# Patient Record
Sex: FEMALE | Race: BLACK OR AFRICAN AMERICAN | Employment: FULL TIME | URBAN - METROPOLITAN AREA
[De-identification: names, ages, dates, MRNs, and addresses within clinical notes are randomized per-mention and may not be internally consistent; named-entity substitution may affect disease eponyms.]

---

## 2017-01-18 ENCOUNTER — OFFICE VISIT (OUTPATIENT)
Dept: PRIMARY CARE CLINIC | Age: 47
End: 2017-01-18

## 2017-01-18 VITALS
DIASTOLIC BLOOD PRESSURE: 76 MMHG | BODY MASS INDEX: 32.09 KG/M2 | SYSTOLIC BLOOD PRESSURE: 122 MMHG | OXYGEN SATURATION: 98 % | HEIGHT: 62 IN | WEIGHT: 174.4 LBS | HEART RATE: 77 BPM | RESPIRATION RATE: 20 BRPM

## 2017-01-18 DIAGNOSIS — R06.02 SHORTNESS OF BREATH: ICD-10-CM

## 2017-01-18 DIAGNOSIS — R68.84 PAIN IN JAW NOT ORIGINATING IN TEMPOROMANDIBULAR JOINT: ICD-10-CM

## 2017-01-18 DIAGNOSIS — R07.9 CHEST PAIN, UNSPECIFIED TYPE: Primary | ICD-10-CM

## 2017-01-18 DIAGNOSIS — R10.11 RIGHT UPPER QUADRANT ABDOMINAL PAIN: ICD-10-CM

## 2017-01-18 PROBLEM — Z87.898 HISTORY OF PALPITATIONS: Status: ACTIVE | Noted: 2017-01-18

## 2017-01-18 PROCEDURE — 99213 OFFICE O/P EST LOW 20 MIN: CPT | Performed by: NURSE PRACTITIONER

## 2017-01-18 ASSESSMENT — ENCOUNTER SYMPTOMS
COUGH: 0
SHORTNESS OF BREATH: 1
ABDOMINAL PAIN: 0
VOMITING: 0
CHEST TIGHTNESS: 1
NAUSEA: 0
DIARRHEA: 0
WHEEZING: 0

## 2017-01-19 ENCOUNTER — OFFICE VISIT (OUTPATIENT)
Dept: PRIMARY CARE CLINIC | Age: 47
End: 2017-01-19

## 2017-01-19 VITALS
WEIGHT: 174 LBS | SYSTOLIC BLOOD PRESSURE: 123 MMHG | TEMPERATURE: 98 F | DIASTOLIC BLOOD PRESSURE: 82 MMHG | OXYGEN SATURATION: 99 % | HEART RATE: 94 BPM | BODY MASS INDEX: 32.34 KG/M2 | RESPIRATION RATE: 18 BRPM

## 2017-01-19 DIAGNOSIS — R11.2 INTRACTABLE VOMITING WITH NAUSEA, UNSPECIFIED VOMITING TYPE: Primary | ICD-10-CM

## 2017-01-19 DIAGNOSIS — R19.7 DIARRHEA, UNSPECIFIED TYPE: ICD-10-CM

## 2017-01-19 PROCEDURE — 99214 OFFICE O/P EST MOD 30 MIN: CPT | Performed by: INTERNAL MEDICINE

## 2017-01-19 ASSESSMENT — ENCOUNTER SYMPTOMS
NAUSEA: 1
SORE THROAT: 0
RESPIRATORY NEGATIVE: 1
CHANGE IN BOWEL HABIT: 1
VOMITING: 0
COUGH: 0
EYES NEGATIVE: 1
ABDOMINAL PAIN: 0
SWOLLEN GLANDS: 0
VISUAL CHANGE: 0

## 2017-01-19 ASSESSMENT — PATIENT HEALTH QUESTIONNAIRE - PHQ9
1. LITTLE INTEREST OR PLEASURE IN DOING THINGS: 0
SUM OF ALL RESPONSES TO PHQ QUESTIONS 1-9: 0
SUM OF ALL RESPONSES TO PHQ9 QUESTIONS 1 & 2: 0
2. FEELING DOWN, DEPRESSED OR HOPELESS: 0

## 2017-03-27 ENCOUNTER — TELEPHONE (OUTPATIENT)
Dept: PRIMARY CARE CLINIC | Age: 47
End: 2017-03-27

## 2017-03-27 DIAGNOSIS — R92.8 ABNORMAL MAMMOGRAM: Primary | ICD-10-CM

## 2017-03-28 ENCOUNTER — HOSPITAL ENCOUNTER (OUTPATIENT)
Dept: MAMMOGRAPHY | Age: 47
Discharge: HOME | End: 2017-03-28
Attending: INTERNAL MEDICINE | Admitting: INTERNAL MEDICINE

## 2017-03-28 DIAGNOSIS — R92.8 ABNORMAL MAMMOGRAM: ICD-10-CM

## 2017-08-14 ENCOUNTER — OFFICE VISIT (OUTPATIENT)
Dept: PRIMARY CARE CLINIC | Age: 47
End: 2017-08-14

## 2017-08-14 VITALS
SYSTOLIC BLOOD PRESSURE: 134 MMHG | TEMPERATURE: 98 F | HEART RATE: 82 BPM | OXYGEN SATURATION: 99 % | WEIGHT: 187 LBS | BODY MASS INDEX: 35.3 KG/M2 | HEIGHT: 61 IN | DIASTOLIC BLOOD PRESSURE: 85 MMHG

## 2017-08-14 DIAGNOSIS — M54.50 ACUTE MIDLINE LOW BACK PAIN WITHOUT SCIATICA: Primary | ICD-10-CM

## 2017-08-14 PROCEDURE — 99213 OFFICE O/P EST LOW 20 MIN: CPT | Performed by: NURSE PRACTITIONER

## 2017-08-14 RX ORDER — PREDNISONE 10 MG/1
TABLET ORAL
Qty: 30 TABLET | Refills: 0 | Status: SHIPPED | OUTPATIENT
Start: 2017-08-14 | End: 2018-01-19

## 2017-08-14 RX ORDER — TRAMADOL HYDROCHLORIDE 50 MG/1
50 TABLET ORAL 2 TIMES DAILY PRN
Qty: 14 TABLET | Refills: 0 | Status: SHIPPED | OUTPATIENT
Start: 2017-08-14 | End: 2017-08-21

## 2017-08-14 RX ORDER — CYCLOBENZAPRINE HCL 10 MG
10 TABLET ORAL 3 TIMES DAILY PRN
Qty: 60 TABLET | Refills: 0 | Status: SHIPPED | OUTPATIENT
Start: 2017-08-14 | End: 2019-02-06 | Stop reason: SDUPTHER

## 2017-08-14 ASSESSMENT — ENCOUNTER SYMPTOMS
BLOOD IN STOOL: 0
COUGH: 0
WHEEZING: 0
CONSTIPATION: 0
CHEST TIGHTNESS: 0
DIARRHEA: 0
ABDOMINAL PAIN: 0
BACK PAIN: 1
GASTROINTESTINAL NEGATIVE: 1
ABDOMINAL DISTENTION: 0
SHORTNESS OF BREATH: 0
SORE THROAT: 0

## 2017-08-24 ENCOUNTER — OFFICE VISIT (OUTPATIENT)
Dept: PRIMARY CARE CLINIC | Age: 47
End: 2017-08-24

## 2017-08-24 VITALS
WEIGHT: 187 LBS | HEIGHT: 61 IN | HEART RATE: 87 BPM | DIASTOLIC BLOOD PRESSURE: 85 MMHG | OXYGEN SATURATION: 98 % | RESPIRATION RATE: 18 BRPM | SYSTOLIC BLOOD PRESSURE: 129 MMHG | BODY MASS INDEX: 35.3 KG/M2 | TEMPERATURE: 98.3 F

## 2017-08-24 DIAGNOSIS — M54.42 ACUTE MIDLINE LOW BACK PAIN WITH LEFT-SIDED SCIATICA: Primary | ICD-10-CM

## 2017-08-24 PROCEDURE — 99213 OFFICE O/P EST LOW 20 MIN: CPT | Performed by: NURSE PRACTITIONER

## 2017-08-24 RX ORDER — IBUPROFEN 400 MG/1
400 TABLET ORAL EVERY 6 HOURS PRN
Qty: 120 TABLET | Refills: 3 | Status: SHIPPED | OUTPATIENT
Start: 2017-08-24 | End: 2019-05-21

## 2017-08-24 ASSESSMENT — ENCOUNTER SYMPTOMS
CHEST TIGHTNESS: 0
COUGH: 0
WHEEZING: 0
SHORTNESS OF BREATH: 0
ABDOMINAL PAIN: 0
GASTROINTESTINAL NEGATIVE: 1
ABDOMINAL DISTENTION: 0
CONSTIPATION: 0
DIARRHEA: 0
BLOOD IN STOOL: 0
BACK PAIN: 1
SORE THROAT: 0

## 2018-01-12 DIAGNOSIS — F41.9 ANXIETY AND DEPRESSION: ICD-10-CM

## 2018-01-12 DIAGNOSIS — F32.A ANXIETY AND DEPRESSION: ICD-10-CM

## 2018-01-19 ENCOUNTER — OFFICE VISIT (OUTPATIENT)
Dept: PRIMARY CARE CLINIC | Age: 48
End: 2018-01-19

## 2018-01-19 VITALS
SYSTOLIC BLOOD PRESSURE: 120 MMHG | DIASTOLIC BLOOD PRESSURE: 70 MMHG | TEMPERATURE: 98.5 F | WEIGHT: 184 LBS | OXYGEN SATURATION: 98 % | BODY MASS INDEX: 34.74 KG/M2 | HEIGHT: 61 IN

## 2018-01-19 DIAGNOSIS — Z71.85 VACCINE COUNSELING: ICD-10-CM

## 2018-01-19 DIAGNOSIS — F41.9 ANXIETY AND DEPRESSION: ICD-10-CM

## 2018-01-19 DIAGNOSIS — F32.A ANXIETY AND DEPRESSION: ICD-10-CM

## 2018-01-19 DIAGNOSIS — E78.2 MIXED HYPERLIPIDEMIA: ICD-10-CM

## 2018-01-19 PROCEDURE — 99214 OFFICE O/P EST MOD 30 MIN: CPT | Performed by: INTERNAL MEDICINE

## 2018-01-19 ASSESSMENT — ENCOUNTER SYMPTOMS
BLOOD IN STOOL: 0
ABDOMINAL DISTENTION: 0
CONSTIPATION: 0
CHEST TIGHTNESS: 0
SHORTNESS OF BREATH: 0
EYES NEGATIVE: 1
GASTROINTESTINAL NEGATIVE: 1
ABDOMINAL PAIN: 0
COUGH: 0
WHEEZING: 0
DIARRHEA: 0

## 2018-01-19 NOTE — PROGRESS NOTES
to person, place, and time. She has normal strength. Skin: Skin is warm. Psychiatric: She has a normal mood and affect. Her behavior is normal. Judgment normal.   Vitals reviewed. Assessment:      Arlette was seen today for check-up. Diagnoses and all orders for this visit:    Anxiety and depression  Asymptomatic with Zoloft   -     sertraline (ZOLOFT) 50 MG tablet; Take 1 tablet by mouth daily    Mixed hyperlipidemia  Since 2015 . Never started medication !  -     Lipid Panel; Future  -     TSH without Reflex; Future  -     Hemoglobin A1C; Future    Vaccine counseling  Needs Tdap & Flu vac                Plan:      Self Management Goals    Know which medication is for what condition:   Know side effects of medications, and discuss with doctor   Discuss side effects and instructions on new medications. Barriers to medication compliance addressed. All patient questions answered. Pt voiced understanding. Know correct dose/frequency of medications  Take medications at the same time each day  Stay current on medication refills  If taking OTC's check with MD/pharmacy first about interactions    LDL goal 491 or less  Systolic BP < or equal to 436  Diastolic BP < or equal to 85    Set targets for weight loss 4 lbs per month  Exercise 3-5 times per week  Keep check of weight  Weighting machine    On a scale of 1 to 5 how confident are you in these goals?   4/5    Education materials given

## 2018-04-25 ENCOUNTER — OFFICE VISIT (OUTPATIENT)
Dept: INTERNAL MEDICINE CLINIC | Age: 48
End: 2018-04-25

## 2018-04-25 VITALS
OXYGEN SATURATION: 99 % | SYSTOLIC BLOOD PRESSURE: 126 MMHG | TEMPERATURE: 98.2 F | WEIGHT: 181 LBS | HEIGHT: 61 IN | RESPIRATION RATE: 18 BRPM | DIASTOLIC BLOOD PRESSURE: 62 MMHG | HEART RATE: 84 BPM | BODY MASS INDEX: 34.17 KG/M2

## 2018-04-25 DIAGNOSIS — F32.A ANXIETY AND DEPRESSION: ICD-10-CM

## 2018-04-25 DIAGNOSIS — E78.5 DYSLIPIDEMIA: Primary | ICD-10-CM

## 2018-04-25 DIAGNOSIS — F41.9 ANXIETY AND DEPRESSION: ICD-10-CM

## 2018-04-25 DIAGNOSIS — R91.8 ABNORMAL CT SCAN, LUNG: ICD-10-CM

## 2018-04-25 PROCEDURE — 99214 OFFICE O/P EST MOD 30 MIN: CPT | Performed by: INTERNAL MEDICINE

## 2018-04-25 RX ORDER — EZETIMIBE 10 MG/1
10 TABLET ORAL DAILY
Qty: 30 TABLET | Refills: 3 | Status: SHIPPED | OUTPATIENT
Start: 2018-04-25 | End: 2018-08-25 | Stop reason: SDUPTHER

## 2018-04-25 RX ORDER — AMPICILLIN TRIHYDRATE 250 MG
1 CAPSULE ORAL DAILY
Qty: 90 CAPSULE | Refills: 5 | Status: SHIPPED | OUTPATIENT
Start: 2018-04-25 | End: 2018-10-15

## 2018-04-25 ASSESSMENT — PATIENT HEALTH QUESTIONNAIRE - PHQ9
2. FEELING DOWN, DEPRESSED OR HOPELESS: 0
SUM OF ALL RESPONSES TO PHQ QUESTIONS 1-9: 0
1. LITTLE INTEREST OR PLEASURE IN DOING THINGS: 0
SUM OF ALL RESPONSES TO PHQ9 QUESTIONS 1 & 2: 0

## 2018-04-25 ASSESSMENT — ENCOUNTER SYMPTOMS
ALLERGIC/IMMUNOLOGIC NEGATIVE: 1
ABDOMINAL PAIN: 1
EYES NEGATIVE: 1
RESPIRATORY NEGATIVE: 1

## 2018-04-30 ENCOUNTER — TELEPHONE (OUTPATIENT)
Dept: INTERNAL MEDICINE CLINIC | Age: 48
End: 2018-04-30

## 2018-05-03 ENCOUNTER — TELEPHONE (OUTPATIENT)
Dept: INTERNAL MEDICINE CLINIC | Age: 48
End: 2018-05-03

## 2018-05-05 ENCOUNTER — TELEPHONE (OUTPATIENT)
Dept: ORTHOPEDIC SURGERY | Age: 48
End: 2018-05-05

## 2018-05-05 ENCOUNTER — HOSPITAL ENCOUNTER (OUTPATIENT)
Dept: CT IMAGING | Age: 48
Discharge: HOME | End: 2018-05-05
Attending: INTERNAL MEDICINE | Admitting: INTERNAL MEDICINE

## 2018-05-05 DIAGNOSIS — R91.8 OTHER NONSPECIFIC ABNORMAL FINDING OF LUNG FIELD (CODE): ICD-10-CM

## 2018-05-05 DIAGNOSIS — R91.8 ABNORMAL CT SCAN, LUNG: ICD-10-CM

## 2018-05-14 ENCOUNTER — TELEPHONE (OUTPATIENT)
Dept: INTERNAL MEDICINE CLINIC | Age: 48
End: 2018-05-14

## 2018-08-03 ENCOUNTER — TELEPHONE (OUTPATIENT)
Dept: INTERNAL MEDICINE CLINIC | Age: 48
End: 2018-08-03

## 2018-08-03 DIAGNOSIS — Z13.29 THYROID DISORDER SCREEN: ICD-10-CM

## 2018-08-03 DIAGNOSIS — E78.5 DYSLIPIDEMIA: Primary | ICD-10-CM

## 2018-08-03 DIAGNOSIS — Z83.3 FAMILY HISTORY OF DIABETES MELLITUS (DM): ICD-10-CM

## 2018-08-25 DIAGNOSIS — E78.5 DYSLIPIDEMIA: ICD-10-CM

## 2018-08-27 RX ORDER — EZETIMIBE 10 MG/1
10 TABLET ORAL DAILY
Qty: 30 TABLET | Refills: 3 | Status: SHIPPED | OUTPATIENT
Start: 2018-08-27 | End: 2019-01-19 | Stop reason: SDUPTHER

## 2018-10-12 DIAGNOSIS — Z83.3 FAMILY HISTORY OF DIABETES MELLITUS (DM): ICD-10-CM

## 2018-10-12 DIAGNOSIS — E78.5 DYSLIPIDEMIA: ICD-10-CM

## 2018-10-12 DIAGNOSIS — Z13.29 THYROID DISORDER SCREEN: ICD-10-CM

## 2018-10-12 LAB
CHOLESTEROL, TOTAL: 243 MG/DL (ref 0–199)
HDLC SERPL-MCNC: 46 MG/DL (ref 40–60)
LDL CHOLESTEROL CALCULATED: 178 MG/DL
TRIGL SERPL-MCNC: 93 MG/DL (ref 0–150)
TSH SERPL DL<=0.05 MIU/L-ACNC: 0.92 UIU/ML (ref 0.27–4.2)
VLDLC SERPL CALC-MCNC: 19 MG/DL

## 2018-10-13 LAB
ESTIMATED AVERAGE GLUCOSE: 125.5 MG/DL
HBA1C MFR BLD: 6 %

## 2018-10-15 ENCOUNTER — OFFICE VISIT (OUTPATIENT)
Dept: INTERNAL MEDICINE CLINIC | Age: 48
End: 2018-10-15
Payer: COMMERCIAL

## 2018-10-15 VITALS
WEIGHT: 184.4 LBS | HEIGHT: 61 IN | TEMPERATURE: 98.1 F | DIASTOLIC BLOOD PRESSURE: 68 MMHG | HEART RATE: 76 BPM | BODY MASS INDEX: 34.81 KG/M2 | RESPIRATION RATE: 16 BRPM | OXYGEN SATURATION: 98 % | SYSTOLIC BLOOD PRESSURE: 126 MMHG

## 2018-10-15 DIAGNOSIS — M79.605 PAIN IN BOTH LOWER EXTREMITIES: ICD-10-CM

## 2018-10-15 DIAGNOSIS — M79.604 PAIN IN BOTH LOWER EXTREMITIES: ICD-10-CM

## 2018-10-15 DIAGNOSIS — E78.5 DYSLIPIDEMIA: Primary | ICD-10-CM

## 2018-10-15 DIAGNOSIS — Z23 NEEDS FLU SHOT: ICD-10-CM

## 2018-10-15 PROCEDURE — 99213 OFFICE O/P EST LOW 20 MIN: CPT | Performed by: INTERNAL MEDICINE

## 2018-10-15 RX ORDER — PRAVASTATIN SODIUM 20 MG
20 TABLET ORAL EVERY EVENING
Qty: 30 TABLET | Refills: 3 | Status: SHIPPED | OUTPATIENT
Start: 2018-10-15 | End: 2019-02-21 | Stop reason: SDUPTHER

## 2018-10-15 ASSESSMENT — PATIENT HEALTH QUESTIONNAIRE - PHQ9
2. FEELING DOWN, DEPRESSED OR HOPELESS: 0
SUM OF ALL RESPONSES TO PHQ QUESTIONS 1-9: 0
1. LITTLE INTEREST OR PLEASURE IN DOING THINGS: 0
SUM OF ALL RESPONSES TO PHQ QUESTIONS 1-9: 0
SUM OF ALL RESPONSES TO PHQ9 QUESTIONS 1 & 2: 0

## 2018-10-15 NOTE — PATIENT INSTRUCTIONS
2.1%  10-year risk of heart disease or stroke  On the basis of your age alone, the USPSTF guidelines suggest there is insufficient evidence you will benefit from starting aspirin for heart disease and stroke risk reduction. On the basis of your calculated risk for heart disease or stroke less than 7.5%, the ACC/AHA guidelines suggest you have no indication to be on a statin. Based on your age, your blood pressure is well-controlled. Demography Cholesterol Blood pressure Risk factors   Age: 53 Total: 736 Systolic: 045 Diabetes: no   Gender: female HDL: 46 Diastolic: 68 Smoking: no   Race: -American  On medication: no    Notes and further reading  Moderate intensity statin may be atorvastatin 10mg, pravastatin 40mg, or simvastatin 20-40mg. High intensity statin may be atorvastatin 40mg-80mg. AHA/ACC guidelines stress the importance of lifestyle modifications to lower cardiovascular disease risk in all patients. This includes eating a heart-healthy diet, regular aerobic exercises, maintenance of desirable body weight and avoidance of tobacco products. Before initiating statin therapy, clinicians and patients ought to engage in a discussion which considers addressing risk factors such as smoking and optimal lifestyle, the potential for ASCVD risk reduction benefits, adverse medication effects, drug-drug interactions, and patient preferences for treatment. Additional factors may be considered to inform treatment decision making.  These factors may include primary LDL-C greater than 160 mg/dL or other evidence of genetic hyperlipidemias, family history of premature ASCVD with onset less than 54years of age in a first degree male relative or less than 72years of age in a first degree female relative, high-sensitivity C-reactive protein greater than 2 mg/L, CAC score greater than 300 Agatston units or greater than 76 percentile for age, sex, and ethnicity, ankle-brachial index less than 0.9, or elevated lifetime risk of ASCVD.   © "[employer] [id number]

## 2018-11-06 ENCOUNTER — OFFICE VISIT (OUTPATIENT)
Dept: INTERNAL MEDICINE CLINIC | Age: 48
End: 2018-11-06
Payer: COMMERCIAL

## 2018-11-06 VITALS
WEIGHT: 183 LBS | DIASTOLIC BLOOD PRESSURE: 82 MMHG | SYSTOLIC BLOOD PRESSURE: 122 MMHG | HEART RATE: 78 BPM | OXYGEN SATURATION: 98 % | BODY MASS INDEX: 34.58 KG/M2

## 2018-11-06 DIAGNOSIS — N61.0 MASTITIS: Primary | ICD-10-CM

## 2018-11-06 DIAGNOSIS — J35.01 CHRONIC TONSILLITIS: ICD-10-CM

## 2018-11-06 DIAGNOSIS — J02.9 PHARYNGITIS, UNSPECIFIED ETIOLOGY: ICD-10-CM

## 2018-11-06 PROCEDURE — 99213 OFFICE O/P EST LOW 20 MIN: CPT | Performed by: NURSE PRACTITIONER

## 2018-11-06 RX ORDER — AZITHROMYCIN 250 MG/1
TABLET, FILM COATED ORAL
Qty: 1 PACKET | Refills: 0 | Status: SHIPPED | OUTPATIENT
Start: 2018-11-06 | End: 2018-11-10

## 2018-11-06 RX ORDER — IBUPROFEN 800 MG/1
TABLET ORAL
Qty: 90 TABLET | Refills: 1 | Status: SHIPPED | OUTPATIENT
Start: 2018-11-06 | End: 2019-05-21

## 2018-11-06 ASSESSMENT — ENCOUNTER SYMPTOMS
TROUBLE SWALLOWING: 1
BACK PAIN: 1

## 2018-11-07 DIAGNOSIS — J35.01 CHRONIC TONSILLITIS: Primary | ICD-10-CM

## 2018-11-08 ENCOUNTER — OFFICE VISIT (OUTPATIENT)
Dept: ENT CLINIC | Age: 48
End: 2018-11-08
Payer: COMMERCIAL

## 2018-11-08 VITALS — HEIGHT: 61 IN | WEIGHT: 183 LBS | BODY MASS INDEX: 34.55 KG/M2

## 2018-11-08 DIAGNOSIS — R13.10 ODYNOPHAGIA: Primary | ICD-10-CM

## 2018-11-08 DIAGNOSIS — M54.2 NECK PAIN: ICD-10-CM

## 2018-11-08 DIAGNOSIS — R49.0 DYSPHONIA: ICD-10-CM

## 2018-11-08 PROCEDURE — 31575 DIAGNOSTIC LARYNGOSCOPY: CPT | Performed by: OTOLARYNGOLOGY

## 2018-11-08 PROCEDURE — 99243 OFF/OP CNSLTJ NEW/EST LOW 30: CPT | Performed by: OTOLARYNGOLOGY

## 2018-11-08 ASSESSMENT — ENCOUNTER SYMPTOMS
CHOKING: 0
STRIDOR: 0
BACK PAIN: 0
SORE THROAT: 1
TROUBLE SWALLOWING: 1
DIARRHEA: 0
VOICE CHANGE: 1
CONSTIPATION: 0
COLOR CHANGE: 0
COUGH: 0
RHINORRHEA: 0
VOMITING: 0
BLOOD IN STOOL: 0
EYE ITCHING: 0
NAUSEA: 0
PHOTOPHOBIA: 0
FACIAL SWELLING: 0
WHEEZING: 0
EYE DISCHARGE: 0
SINUS PAIN: 0
SINUS PRESSURE: 0
SHORTNESS OF BREATH: 0

## 2018-11-08 NOTE — PROGRESS NOTES
[facility]  [address] [address] [facility], [address] [place] [address] [place]  P: [phone]  F: [phone]       Patient     D Jason Dumont  1970    ChiefComplaint     Chief Complaint   Patient presents with    Pharyngitis     NP c/o right sided neck and tonsil pain, pt saw a PCP and was referred to us. C/O tonsil stones and 3-4 sore throats in average 6 mos period       History of Present Illness     Arlette is a pleasant 73-year-old female who presents today for upper respiratory infection symptoms since Sunday. She has right cervical tender adenopathy, odynophagia, sore throat, and gravelly change in voice. She saw her PCP on Tuesday placed her on a Z-Rey. This has not helped her symptoms. 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 Denies any history of alcohol or tobacco use. She took ibuprofen 1 day which did help the symptoms somewhat. She states she does get some sore throats occasionally with tonsil stones. No difficulty breathing today. Denies dysphagia.     Past Medical History     Past Medical History:   Diagnosis Date    Depression     High cholesterol     unmedicated due to side-effects    History of palpitations     per pt: refused medication at time of diagnosis    Low back pain        Past Surgical History     Past Surgical History:   Procedure Laterality Date    UTERINE FIBROID EMBOLIZATION  2012       Family History     Family History   Problem Relation Age of Onset    Heart Disease Mother 61    Arthritis Mother     Diabetes Father 61    Heart Disease Father     Kidney Disease Father     Hypertension Brother        Social History     Social History     Social History    Marital status:      Spouse name: N/A    Number of children: 0    Years of education: N/A     Occupational History    Teacher - [employer]      Social History Main Topics    Smoking status: Never Smoker    Smokeless tobacco: Never Used    Alcohol use 0.0 oz/week      Comment:

## 2019-01-19 DIAGNOSIS — E78.5 DYSLIPIDEMIA: ICD-10-CM

## 2019-01-21 RX ORDER — EZETIMIBE 10 MG/1
TABLET ORAL
Qty: 30 TABLET | Refills: 3 | Status: SHIPPED | OUTPATIENT
Start: 2019-01-21 | End: 2019-05-21

## 2019-02-06 ENCOUNTER — OFFICE VISIT (OUTPATIENT)
Dept: INTERNAL MEDICINE CLINIC | Age: 49
End: 2019-02-06
Payer: COMMERCIAL

## 2019-02-06 VITALS
OXYGEN SATURATION: 98 % | DIASTOLIC BLOOD PRESSURE: 68 MMHG | SYSTOLIC BLOOD PRESSURE: 118 MMHG | HEART RATE: 76 BPM | WEIGHT: 184 LBS | HEIGHT: 61 IN | BODY MASS INDEX: 34.74 KG/M2 | TEMPERATURE: 99 F

## 2019-02-06 DIAGNOSIS — Z00.00 WELL ADULT EXAM: Primary | ICD-10-CM

## 2019-02-06 DIAGNOSIS — H81.10 BENIGN PAROXYSMAL POSITIONAL VERTIGO, UNSPECIFIED LATERALITY: ICD-10-CM

## 2019-02-06 DIAGNOSIS — M79.645 PAIN OF LEFT THUMB: ICD-10-CM

## 2019-02-06 DIAGNOSIS — Z12.31 SCREENING MAMMOGRAM, ENCOUNTER FOR: ICD-10-CM

## 2019-02-06 PROCEDURE — 99396 PREV VISIT EST AGE 40-64: CPT | Performed by: INTERNAL MEDICINE

## 2019-02-06 RX ORDER — IBUPROFEN 800 MG/1
800 TABLET ORAL EVERY 8 HOURS PRN
Qty: 90 TABLET | Refills: 5 | Status: SHIPPED | OUTPATIENT
Start: 2019-02-06 | End: 2019-03-19 | Stop reason: SDUPTHER

## 2019-02-06 RX ORDER — CYCLOBENZAPRINE HCL 10 MG
10 TABLET ORAL 3 TIMES DAILY PRN
Qty: 60 TABLET | Refills: 0 | Status: SHIPPED | OUTPATIENT
Start: 2019-02-06 | End: 2019-02-16

## 2019-02-06 ASSESSMENT — ENCOUNTER SYMPTOMS
RESPIRATORY NEGATIVE: 1
ALLERGIC/IMMUNOLOGIC NEGATIVE: 1
GASTROINTESTINAL NEGATIVE: 1
EYES NEGATIVE: 1

## 2019-02-15 ENCOUNTER — PATIENT MESSAGE (OUTPATIENT)
Dept: INTERNAL MEDICINE CLINIC | Age: 49
End: 2019-02-15

## 2019-02-21 DIAGNOSIS — E78.5 DYSLIPIDEMIA: ICD-10-CM

## 2019-02-24 RX ORDER — PRAVASTATIN SODIUM 20 MG
20 TABLET ORAL EVERY EVENING
Qty: 30 TABLET | Refills: 3 | Status: SHIPPED | OUTPATIENT
Start: 2019-02-24 | End: 2019-07-14 | Stop reason: SDUPTHER

## 2019-02-24 ASSESSMENT — ENCOUNTER SYMPTOMS
VOMITING: 0
VISUAL CHANGE: 0
SORE THROAT: 0
COUGH: 0
SWOLLEN GLANDS: 0
CHANGE IN BOWEL HABIT: 0
NAUSEA: 0

## 2019-03-19 ENCOUNTER — HOSPITAL ENCOUNTER (OUTPATIENT)
Dept: GENERAL RADIOLOGY | Age: 49
Discharge: HOME | End: 2019-03-19
Payer: COMMERCIAL

## 2019-03-19 ENCOUNTER — HOSPITAL ENCOUNTER (OUTPATIENT)
Age: 49
Discharge: HOME | End: 2019-03-19
Payer: COMMERCIAL

## 2019-03-19 ENCOUNTER — OFFICE VISIT (OUTPATIENT)
Dept: INTERNAL MEDICINE CLINIC | Age: 49
End: 2019-03-19
Payer: COMMERCIAL

## 2019-03-19 VITALS
BODY MASS INDEX: 35.71 KG/M2 | DIASTOLIC BLOOD PRESSURE: 82 MMHG | SYSTOLIC BLOOD PRESSURE: 130 MMHG | OXYGEN SATURATION: 98 % | WEIGHT: 189 LBS | HEART RATE: 78 BPM

## 2019-03-19 DIAGNOSIS — M54.42 ACUTE LEFT-SIDED LOW BACK PAIN WITH LEFT-SIDED SCIATICA: ICD-10-CM

## 2019-03-19 DIAGNOSIS — M54.42 ACUTE LEFT-SIDED LOW BACK PAIN WITH LEFT-SIDED SCIATICA: Primary | ICD-10-CM

## 2019-03-19 DIAGNOSIS — M79.645 PAIN OF LEFT THUMB: ICD-10-CM

## 2019-03-19 PROCEDURE — 73120 X-RAY EXAM OF HAND: CPT

## 2019-03-19 PROCEDURE — 72202 X-RAY EXAM SI JOINTS 3/> VWS: CPT

## 2019-03-19 PROCEDURE — 99213 OFFICE O/P EST LOW 20 MIN: CPT | Performed by: INTERNAL MEDICINE

## 2019-03-19 PROCEDURE — 72100 X-RAY EXAM L-S SPINE 2/3 VWS: CPT

## 2019-03-19 PROCEDURE — 73130 X-RAY EXAM OF HAND: CPT

## 2019-03-19 RX ORDER — TRAMADOL HYDROCHLORIDE 50 MG/1
50 TABLET ORAL EVERY 6 HOURS PRN
Qty: 28 TABLET | Refills: 0 | Status: SHIPPED | OUTPATIENT
Start: 2019-03-19 | End: 2019-03-26

## 2019-03-19 RX ORDER — METHOCARBAMOL 500 MG/1
500 TABLET, FILM COATED ORAL 4 TIMES DAILY
Qty: 40 TABLET | Refills: 0 | Status: SHIPPED | OUTPATIENT
Start: 2019-03-19 | End: 2019-03-29

## 2019-03-19 RX ORDER — PREDNISONE 20 MG/1
60 TABLET ORAL DAILY
Qty: 15 TABLET | Refills: 0 | Status: SHIPPED | OUTPATIENT
Start: 2019-03-19 | End: 2019-03-24

## 2019-03-19 ASSESSMENT — ENCOUNTER SYMPTOMS
BOWEL INCONTINENCE: 0
BACK PAIN: 1

## 2019-03-20 ENCOUNTER — TELEPHONE (OUTPATIENT)
Dept: INTERNAL MEDICINE CLINIC | Age: 49
End: 2019-03-20

## 2019-05-02 DIAGNOSIS — F32.A ANXIETY AND DEPRESSION: ICD-10-CM

## 2019-05-02 DIAGNOSIS — F41.9 ANXIETY AND DEPRESSION: ICD-10-CM

## 2019-05-21 ENCOUNTER — OFFICE VISIT (OUTPATIENT)
Dept: PRIMARY CARE CLINIC | Age: 49
End: 2019-05-21
Payer: COMMERCIAL

## 2019-05-21 VITALS
TEMPERATURE: 99.7 F | OXYGEN SATURATION: 98 % | HEART RATE: 93 BPM | SYSTOLIC BLOOD PRESSURE: 104 MMHG | RESPIRATION RATE: 18 BRPM | DIASTOLIC BLOOD PRESSURE: 72 MMHG

## 2019-05-21 DIAGNOSIS — J20.9 ACUTE BRONCHITIS, UNSPECIFIED ORGANISM: Primary | ICD-10-CM

## 2019-05-21 PROBLEM — R11.2 INTRACTABLE VOMITING WITH NAUSEA: Status: RESOLVED | Noted: 2017-01-19 | Resolved: 2019-05-21

## 2019-05-21 PROBLEM — M54.40 BACK PAIN OF LUMBOSACRAL REGION WITH SCIATICA: Status: ACTIVE | Noted: 2017-09-15

## 2019-05-21 PROBLEM — R19.7 DIARRHEA: Status: RESOLVED | Noted: 2017-01-19 | Resolved: 2019-05-21

## 2019-05-21 PROCEDURE — 99213 OFFICE O/P EST LOW 20 MIN: CPT | Performed by: NURSE PRACTITIONER

## 2019-05-21 RX ORDER — IBUPROFEN 600 MG/1
600 TABLET ORAL EVERY 6 HOURS PRN
Qty: 20 TABLET | Refills: 0 | Status: SHIPPED | OUTPATIENT
Start: 2019-05-21 | End: 2021-07-15

## 2019-05-21 RX ORDER — DOXYCYCLINE HYCLATE 100 MG
100 TABLET ORAL 2 TIMES DAILY
Qty: 14 TABLET | Refills: 0 | Status: SHIPPED | OUTPATIENT
Start: 2019-05-23 | End: 2019-05-30

## 2019-05-21 RX ORDER — GUAIFENESIN AND PSEUDOEPHEDRINE HCL 1200; 120 MG/1; MG/1
1 TABLET, EXTENDED RELEASE ORAL EVERY 12 HOURS PRN
Qty: 20 TABLET | Refills: 0 | Status: SHIPPED | OUTPATIENT
Start: 2019-05-21 | End: 2019-05-31

## 2019-05-21 ASSESSMENT — ENCOUNTER SYMPTOMS
SINUS PAIN: 0
NAUSEA: 1
VOICE CHANGE: 1
SHORTNESS OF BREATH: 1
ABDOMINAL DISTENTION: 0
SORE THROAT: 1
CHEST TIGHTNESS: 0
COUGH: 1
COLOR CHANGE: 0
VOMITING: 0
ABDOMINAL PAIN: 0
DIARRHEA: 1
WHEEZING: 1
EYES NEGATIVE: 1

## 2019-05-21 NOTE — PATIENT INSTRUCTIONS
Ms. Sharmin Diehl, your lung sounds are clear today, and fever and symptoms appear to be slowly resolving. You may have contracted late flu, or likely some other respiratory virus (e.g. parainfluenza, coronavirus, etc). I recommend frequent hydration and Mucinex D to relieve symptoms. If you experience persistent or worsening symptoms over the next 2 days, start doxycycline. This is a medication less likely to aggrivate your hear palpitations, and is useful for Community Aquired Pneumonia, if present. See enclosed information regarding the risks/benefits of medications prescribed today. Below is additional information about bronchitis:    Most cases of bronchitis are viral in nature. The viruses that cause bronchitis infect the bronchi and irritate them. People often wonder if taking antibiotics will help with their bronchitis. But the answer is no, because it is usually caused by a virus. Antibiotics kill bacteria, not viruses. The most common symptoms of bronchitis are:  ? A nagging cough that can last up to a few weeks  ? Coughing up mucus that is clear, yellow, or green - Some people think green mucus means you have a bacterial infection. But this is not always true. ? You might also have normal cold or flu symptoms, like a stuffy nose, sore throat, or headache. People with bronchitis do not usually get a fever. The cough can take up to 3 weeks to get better, sometimes even longer. But you should call your doctor or nurse if you have:  ? A fever higher than 100.4°F (38°C)  ? Chest pain when you cough, trouble breathing, or coughing up blood  ? A barking cough that makes it hard to talk  ? A cough and weight loss that you cannot explain  ? Symptoms that are not getting better after 3 weeks  Bronchitis almost always goes away on its own, although it can take a few weeks. Doctors do not usually treat bronchitis with antibiotic medicines.  That's because bronchitis is usually caused by a virus, and antibiotics kill bacteria, not viruses. Antibiotics will not help your bronchitis go away faster, and they can actually cause other problems. So it's not a good idea to take them if you don't really need them. To feel better, you can treat your cold and flu symptoms. Different treatments you can try include:  ? Getting lots of rest and drinking plenty of liquids  ? Drinking hot tea  ? Sucking on cough drops or hard candy (if not a choking hazard)  ? Taking over-the-counter cough and cold medicines (as directed by your provider)  ? Breathing in warm, moist air, such as in the shower, over a kettle, or from a humidifier  ? Taking a pain-relieving medicine if you have cold or flu symptoms like headache, muscle aches, or joint pain    It's also important to avoid smoking or being around others who smoke. This can make your cough worse. How can I keep from getting bronchitis again? You can reduce your chance of getting bronchitis again by keeping the germs that cause bronchitis out of your body. One of the best ways to do this is to wash your hands often with soap and water. If there is no sink nearby, you can use a hand gel with alcohol in it to clean your hands. How can I keep from spreading my germs? In addition to washing your hands often, you should cover your mouth with your elbow when you sneeze or cough. Using your elbow keeps you from getting germs on your hands. If you use a tissue, throw the tissue away and wash your hands. Patient Education        guaifenesin and pseudoephedrine  Pronunciation:  gwye FEN e sin, HARVEY ayala ee FED rin  Brand:  Ambifed-G, Despec-SR, Entex T, Maxifed, Mucinex D, Mucinex D Max Strength, Poly-Vent IR, Respaire-30, Robitussin Severe Congestion, Sinutab Non Drying, Triaminic Softchews Chest Congestion, Tusnel Pediatric Drops  What is the most important information I should know about guaifenesin and pseudoephedrine? Do not give this medication to a child younger than 3years old. Always ask a doctor before giving a cough or cold medicine to a child. Death can occur from the misuse of cough and cold medicines in very young children. What is guaifenesin and pseudoephedrine? There are many brands and forms of guaifenesin and pseudoephedrine available and not all brands are listed on this leaflet. Guaifenesin is an expectorant. It helps loosen congestion in your chest and throat, making it easier to cough out through your mouth. Pseudoephedrine is a decongestant that shrinks blood vessels in the nasal passages. Dilated blood vessels can cause nasal congestion (stuffy nose). Guaifenesin and pseudoephedrine is a combination medicine used to treat stuffy nose and sinus congestion, and to reduce chest congestion caused by the common cold or flu. Guaifenesin and pseudoephedrine may also be used for purposes not listed in this medication guide. What should I discuss with my healthcare provider before taking guaifenesin and pseudoephedrine? You should not use guaifenesin and pseudoephedrine if you are allergic to it. Do not use guaifenesin and pseudoephedrine if you have taken an MAO inhibitor in the past 14 days. A dangerous drug interaction could occur. MAO inhibitors include isocarboxazid, linezolid, phenelzine, rasagiline, selegiline, and tranylcypromine. Ask a doctor or pharmacist if it is safe for you to take this medication if you have:  · high blood pressure, heart disease, coronary artery disease;  · diabetes;  · circulation problems;  · glaucoma;  · overactive thyroid; or  · enlarged prostate or problems with urination. It is not known if this medication may be harmful to an unborn baby. Do not use this medicine without medical advice if you are pregnant. Guaifenesin and pseudoephedrine may pass into breast milk and may harm a nursing baby. Decongestants may also slow breast milk production.   Do not use this medicine without medical advice if you are breast-feeding a medicine can cause side effects. Ask a doctor or pharmacist before using any other cough, cold, or allergy medicine. Guaifenesin and pseudoephedrine are contained in many combination medicines. Taking certain products together can cause you to get too much of a certain drug. What are the possible side effects of guaifenesin and pseudoephedrine? Get emergency medical help if you have signs of an allergic reaction: hives; difficult breathing; swelling of your face, lips, tongue, or throat. Stop using guaifenesin and pseudoephedrine and call your doctor at once if you have:  · fast, pounding, or uneven heartbeat; or  · severe anxiety or nervousness. Common side effects may include:  · dry mouth, nose, or throat;  · upset stomach, loss of appetite, vomiting;  · feeling excited or restless (especially in children);  · sleep problems (insomnia); or  · headache, dizziness. This is not a complete list of side effects and others may occur. Call your doctor for medical advice about side effects. You may report side effects to FDA at 6-087-FDA-5084. What other drugs will affect guaifenesin and pseudoephedrine? Ask a doctor or pharmacist before using this medicine if you are also using any other drugs, including prescription and over-the-counter medicines, vitamins, and herbal products. Some medicines can cause unwanted or dangerous effects when used together. Not all possible interactions are listed in this medication guide. Taking this medicine with other drugs that make you sleepy or slow your breathing can worsen these effects. Ask your doctor before taking guaifenesin and pseudoephedrine with a sleeping pill, narcotic pain medicine, muscle relaxer, or medicine for anxiety, depression, or seizures. Where can I get more information? Your pharmacist can provide more information about guaifenesin and pseudoephedrine.   Remember, keep this and all other medicines out of the reach of children, never share your doxycycline  Pronunciation:  DOX sara rizo  Brand:  Acticlate, Adoxa, Alodox, Avidoxy, Doryx, Mondoxyne NL, Monodox, Morgidox, Oracea, Oraxyl, Targadox, Vibramycin  What is the most important information I should know about doxycycline? You should not take this medicine if you are allergic to any tetracycline antibiotic. Children younger than 6years old should use doxycycline only in cases of severe or life-threatening conditions. This medicine can cause permanent yellowing or graying of the teeth in children  Using doxycycline during pregnancy could harm the unborn baby or cause permanent tooth discoloration later in the baby's life. What is doxycycline? Doxycycline is a tetracycline antibiotic that fights bacteria in the body. Doxycycline is used to treat many different bacterial infections, such as acne, urinary tract infections, intestinal infections, eye infections, gonorrhea, chlamydia, periodontitis (gum disease), and others. Doxycycline is also used to treat blemishes, bumps, and acne-like lesions caused by rosacea. Doxycycline will not treat facial redness caused by rosacea. Some forms of doxycycline are used to prevent malaria, to treat anthrax, or to treat infections caused by mites, ticks, or lice. Doxycycline may also be used for purposes not listed in this medication guide. What should I discuss with my healthcare provider before taking doxycycline? You should not take this medicine if you are allergic to doxycycline or other tetracycline antibiotics such as demeclocycline, minocycline, tetracycline, or tigecycline. Tell your doctor if you have ever had:  · liver disease;  · kidney disease;  · asthma or sulfite allergy;  · increased pressure inside your skull; or  · if you also take isotretinoin, seizure medicine, or a blood thinner such as warfarin (Coumadin).   If you are using doxycycline to treat gonorrhea, your doctor may test you to make sure you do not also have syphilis, another sexually transmitted disease. Taking this medicine during pregnancy may affect tooth and bone development in the unborn baby. Taking doxycycline during the last half of pregnancy can cause permanent tooth discoloration later in the baby's life. Tell your doctor if you are pregnant or if you become pregnant. Doxycycline can make birth control pills less effective. Ask your doctor about using a non-hormonal birth control (condom, diaphragm with spermicide) to prevent pregnancy. Doxycycline can pass into breast milk and may affect bone and tooth development in a nursing infant. Do not breast-feed while you are taking doxycycline. Doxycycline can cause permanent yellowing or graying of the teeth in children younger than 6years old. Children should use doxycycline only in cases of severe or life-threatening conditions such as anthrax or [facility] spotted fever. The benefit of treating a serious condition may outweigh any risks to the child's tooth development. How should I take doxycycline? Follow all directions on your prescription label and read all medication guides or instruction sheets. Use the medicine exactly as directed. Take doxycycline with a full glass of water. Drink plenty of liquids while you are taking doxycycline. Read and carefully follow any Instructions for Use provided with your medicine. Ask your doctor or pharmacist if you do not understand these instructions. Most brands of doxycyline may be taken with food or milk if the medicine upsets your stomach. Different brands of doxycycline may have different instructions about taking them with or without food. Take Oracea on an empty stomach, at least 1 hour before or 2 hours after a meal.  You may need to split a doxycycline tablet to get the correct dose. Follow your doctor's instructions. Swallow a delayed-release capsule or tablet whole. Do not crush, chew, break, or open it.   Measure liquid medicine  with the dosing syringe provided, or with a special dose-measuring spoon or medicine cup. If you do not have a dose-measuring device, ask your pharmacist for one. If you take doxycycline to prevent malaria: Start taking the medicine 1 or 2 days before entering an area where malaria is common. Continue taking the medicine every day during your stay and for at least 4 weeks after you leave the area. Use this medicine for the full prescribed length of time, even if your symptoms quickly improve. Skipping doses can increase your risk of infection that is resistant to medication. Doxycycline will not treat a viral infection such as the flu or a common cold. Store at room temperature away from moisture, heat, and light. Throw away any unused medicine after the expiration date on the label has passed. Using  doxycycline can cause damage to your kidneys. What happens if I miss a dose? Take the medicine as soon as you can, but skip the missed dose if it is almost time for your next dose. Do not take two doses at one time. What happens if I overdose? Seek emergency medical attention or call the Poison Help line at 1-958.349.6671. What should I avoid while taking doxycycline? Do not take iron supplements, multivitamins, calcium supplements, antacids, or laxatives within 2 hours before or after taking doxycycline. Avoid taking any other antibiotics with doxycycline unless your doctor has told you to. Doxycycline could make you sunburn more easily. Avoid sunlight or tanning beds. Wear protective clothing and use sunscreen (SPF 30 or higher) when you are outdoors. Antibiotic medicines can cause diarrhea, which may be a sign of a new infection. If you have diarrhea that is watery or bloody, call your doctor. Do not use anti-diarrhea medicine unless your doctor tells you to. What are the possible side effects of doxycycline?   Get emergency medical help if you have signs of an allergic reaction (hives, difficult breathing, swelling in your face or throat) or a severe skin reaction (fever, sore throat, burning in your eyes, skin pain, red or purple skin rash that spreads and causes blistering and peeling). Seek medical treatment if you have a serious drug reaction that can affect many parts of your body. Symptoms may include: skin rash, fever, swollen glands, flu-like symptoms, muscle aches, severe weakness, unusual bruising, or yellowing of your skin or eyes. This reaction may occur several weeks after you began using doxycycline. Call your doctor at once if you have:  · severe stomach pain, diarrhea that is watery or bloody;  · throat irritation, trouble swallowing;  · chest pain, irregular heart rhythm, feeling short of breath;  · little or no urination;  · low white blood cell counts --fever, chills, swollen glands, body aches, weakness, pale skin, easy bruising or bleeding;  · increased pressure inside the skull --severe headaches, ringing in your ears, dizziness, nausea, vision problems, pain behind your eyes; or  · signs of liver or pancreas problems --loss of appetite, upper stomach pain (that may spread to your back), tiredness, nausea or vomiting, fast heart rate, dark urine, jaundice (yellowing of the skin or eyes). Common side effects may include:  · nausea, vomiting, upset stomach, loss of appetite;  · mild diarrhea;  · skin rash or itching;  · darkened skin color; or  · vaginal itching or discharge. This is not a complete list of side effects and others may occur. Call your doctor for medical advice about side effects. You may report side effects to FDA at 4-501-FDA-6588. What other drugs will affect doxycycline? Sometimes it is not safe to use certain medications at the same time. Some drugs can affect your blood levels of other drugs you take, which may increase side effects or make the medications less effective.   Other drugs may affect doxycycline, including prescription and over-the-counter medicines, vitamins, and herbal products. Tell your doctor about all your current medicines and any medicine you start or stop using. Where can I get more information? Your pharmacist can provide more information about doxycycline. Remember, keep this and all other medicines out of the reach of children, never share your medicines with others, and use this medication only for the indication prescribed. Every effort has been made to ensure that the information provided by [address] is accurate, up-to-date, and complete, but no guarantee is made to that effect. Drug information contained herein may be time sensitive. [facility] information has been compiled for use by healthcare practitioners and consumers in the [place] and therefore [employer] does not warrant that uses outside of the [place] are appropriate, unless specifically indicated otherwise. [facility]'s drug information does not endorse drugs, diagnose patients or recommend therapy. [facility] drug information is an informational resource designed to assist licensed healthcare practitioners in caring for their patients and/or to serve consumers viewing this service as a supplement to, and not a substitute for, the expertise, skill, knowledge and judgment of healthcare practitioners. The absence of a warning for a given drug or drug combination in no way should be construed to indicate that the drug or drug combination is safe, effective or appropriate for any given patient. [facility] does not assume any responsibility for any aspect of healthcare administered with the aid of information [facility] provides. The information contained herein is not intended to cover all possible uses, directions, precautions, warnings, drug interactions, allergic reactions, or adverse effects. If you have questions about the drugs you are taking, check with your doctor, nurse or pharmacist.  Copyright 2011-4056 [employer]. Version: 21.01. Revision date: 12/7/2018.   Care instructions adapted under license by [facility] ([facility]). If you have questions about a medical condition or this instruction, always ask your healthcare professional. [address] any warranty or liability for your use of this information. Patient Education        Bronchitis: Care Instructions  Your Care Instructions    Bronchitis is inflammation of the bronchial tubes, which carry air to the lungs. The tubes swell and produce mucus, or phlegm. The mucus and inflamed bronchial tubes make you cough. You may have trouble breathing. Most cases of bronchitis are caused by viruses like those that cause colds. Antibiotics usually do not help and they may be harmful. Bronchitis usually develops rapidly and lasts about 2 to 3 weeks in otherwise healthy people. Follow-up care is a key part of your treatment and safety. Be sure to make and go to all appointments, and call your doctor if you are having problems. It's also a good idea to know your test results and keep a list of the medicines you take. How can you care for yourself at home? · Take all medicines exactly as prescribed. Call your doctor if you think you are having a problem with your medicine. · Get some extra rest.  · Take an over-the-counter pain medicine, such as acetaminophen (Tylenol), ibuprofen (Advil, Motrin), or naproxen (Aleve) to reduce fever and relieve body aches. Read and follow all instructions on the label. · Do not take two or more pain medicines at the same time unless the doctor told you to. Many pain medicines have acetaminophen, which is Tylenol. Too much acetaminophen (Tylenol) can be harmful. · Take an over-the-counter cough medicine that contains dextromethorphan to help quiet a dry, hacking cough so that you can sleep. Avoid cough medicines that have more than one active ingredient. Read and follow all instructions on the label. · Breathe moist air from a humidifier, hot shower, or sink filled with hot water.  The heat and moisture will thin mucus so you can cough it out. · Do not smoke. Smoking can make bronchitis worse. If you need help quitting, talk to your doctor about stop-smoking programs and medicines. These can increase your chances of quitting for good. When should you call for help? Call 911 anytime you think you may need emergency care. For example, call if:    · You have severe trouble breathing.    Call your doctor now or seek immediate medical care if:    · You have new or worse trouble breathing.     · You cough up dark brown or bloody mucus (sputum).     · You have a new or higher fever.     · You have a new rash.    Watch closely for changes in your health, and be sure to contact your doctor if:    · You cough more deeply or more often, especially if you notice more mucus or a change in the color of your mucus.     · You are not getting better as expected. Where can you learn more? Go to [email or web address]. org and sign in to your [email or web address] account. Enter H333 in the [employer] box to learn more about \"Bronchitis: Care Instructions. \"     If you do not have an account, please click on the \"Sign Up Now\" link. Current as of: September 5, 2018  Content Version: 12.0  © 2124-1568 [employer]. Care instructions adapted under license by [facility] ([facility]). If you have questions about a medical condition or this instruction, always ask your healthcare professional. [place] [address] any warranty or liability for your use of this information.

## 2019-05-21 NOTE — PROGRESS NOTES
Patient to clinic for complaint of cough and CP localized to RSB. Symptoms started Friday, 05/17/2019, progressed over the weekend. Had 101F temp Saturday, Sunday continued with fevers, cough and then developed diarrhea. Today she reports symptoms have improved; diarrhea has resolved and fevers are down. However, she continues to have CP with cough. She also reports nasal congestion is (blowing yellow drainage), and sputum is thick and brown/yellow. Cough   This is a new problem. The current episode started in the past 7 days. The problem has been gradually improving. The cough is productive of brown sputum. Associated symptoms include chest pain, chills, a fever (in the past 3 days, improving), myalgias (particularly Saturday), a sore throat (with cough, localized to area near suprasternal notch), shortness of breath and wheezing. Pertinent negatives include no ear pain or rash. Nothing aggravates the symptoms. She has tried OTC cough suppressant and rest for the symptoms. The treatment provided mild relief. There is no history of asthma or COPD. Review of Systems   Constitutional: Positive for activity change (decreased but improving), appetite change (decreased), chills, fatigue and fever (in the past 3 days, improving). HENT: Positive for congestion, sore throat (with cough, localized to area near suprasternal notch) and voice change (hoarse). Negative for ear pain and sinus pain. Eyes: Negative. Respiratory: Positive for cough, shortness of breath and wheezing. Negative for chest tightness. Cardiovascular: Positive for chest pain and palpitations (occasional). Gastrointestinal: Positive for diarrhea and nausea. Negative for abdominal distention, abdominal pain and vomiting. Musculoskeletal: Positive for myalgias (particularly Saturday). Negative for neck pain and neck stiffness. Skin: Negative for color change, pallor and rash.    Allergic/Immunologic: Negative for immunocompromised state. Patient Active Problem List   Diagnosis    Depression    Back pain of lumbosacral region with sciatica    Family history of diabetes mellitus (DM)    History of palpitations    Acute renal failure (HCC)    Dysmenorrhea    Excessive or frequent menstruation    Nonspecific abnormal results of kidney function study    Other specified disorders of kidney and ureter    Shortness of breath    Undiagnosed cardiac murmurs       Past Medical History  Past Medical History:   Diagnosis Date    Depression     High cholesterol     unmedicated due to side-effects    History of palpitations     per pt: refused medication at time of diagnosis    Low back pain        Current Medications  Current Outpatient Medications on File Prior to Visit   Medication Sig Dispense Refill    sertraline (ZOLOFT) 50 MG tablet TAKE 1 TABLET BY MOUTH DAILY 30 tablet 4    pravastatin (PRAVACHOL) 20 MG tablet TAKE 1 TABLET BY MOUTH EVERY EVENING 30 tablet 3     No current facility-administered medications on file prior to visit. Allergies  Allergies   Allergen Reactions    Latex Hives    Fluconazole In Dextrose      Vaginal burning    Iodine        Past Surgical History  Past Surgical History:   Procedure Laterality Date    UTERINE FIBROID EMBOLIZATION  2012       Past Social History  Social History     Tobacco Use    Smoking status: Never Smoker    Smokeless tobacco: Never Used   Substance Use Topics    Alcohol use: Yes     Alcohol/week: 0.0 oz     Comment: Occasionally    Drug use: No        Vitals  Vitals:    05/21/19 0757   BP: 104/72   Pulse: 93   Resp: 18   Temp: 99.7 °F (37.6 °C)   SpO2: 98%          Physical Exam   Constitutional: She is oriented to person, place, and time. She appears well-developed and well-nourished. No distress. HENT:   Head: Normocephalic and atraumatic.    Right Ear: Tympanic membrane, external ear and ear canal normal.   Left Ear: Tympanic membrane, external ear and ear canal normal.   Nose: No mucosal edema (slightly injected mucosa), rhinorrhea, sinus tenderness, nasal deformity or septal deviation. Mouth/Throat: Oropharynx is clear and moist and mucous membranes are normal. No uvula swelling. Tonsils are 1+ on the right. Tonsils are 1+ on the left. No tonsillar exudate. Eyes: Pupils are equal, round, and reactive to light. Conjunctivae, EOM and lids are normal.   Neck: Normal range of motion. Neck supple. Cardiovascular: Normal rate, regular rhythm, S1 normal, S2 normal and normal heart sounds. Exam reveals no gallop and no friction rub. No murmur heard. Pulmonary/Chest: Effort normal and breath sounds normal. No respiratory distress. She has no decreased breath sounds. She has no wheezes. She has no rhonchi. She has no rales. She exhibits tenderness (RSB). Infrequent dry cough   Lymphadenopathy:        Head (right side): No submental, no submandibular and no tonsillar adenopathy present. Head (left side): No submental, no submandibular and no tonsillar adenopathy present. She has no cervical adenopathy. Neurological: She is alert and oriented to person, place, and time. She has normal strength. Gait normal.   Skin: Skin is warm, dry and intact. Capillary refill takes less than 2 seconds. She is not diaphoretic. No pallor. Psychiatric: She has a normal mood and affect. Her speech is normal and behavior is normal. Judgment and thought content normal. Cognition and memory are normal.   Patient appears fatigued       Assessment    ICD-10-CM    1. Acute bronchitis, unspecified organism J20.9 Pseudoephedrine-guaiFENesin (MUCINEX D MAX STRENGTH) 120-1200 MG TB12     54050 - [place] NONINVASV OXYGEN SATUR;SINGLE     doxycycline hyclate (VIBRA-TABS) 100 MG tablet     ibuprofen (ADVIL;MOTRIN) 600 MG tablet       Plan  1.  Acute bronchitis, unspecified organism  - Pseudoephedrine-guaiFENesin (MUCINEX D MAX STRENGTH) 120-1200 MG TB12; Take 1 tablet by mouth every 12 hours as needed (chest and nasal congestion)  Dispense: 20 tablet; Refill: 0  - 35775 - [place] NONINVASV OXYGEN SATUR;SINGLE  - doxycycline hyclate (VIBRA-TABS) 100 MG tablet; Take 1 tablet by mouth 2 times daily for 7 days  Dispense: 14 tablet; Refill: 0    Reassurance provided; lung sounds are clear today, fever and symptoms appear to be slowly resolving. May have been late flu, likely some other respiratory virus. Recommend frequent hydration and Mucinex D to relieve symptoms. If persistent or worsening symptoms over the next 2 days, start doxycycline. Patient advised on risks/benefits of medications. Education regarding bronchitis provided:    Most cases of bronchitis are viral in nature. The viruses that cause bronchitis infect the bronchi and irritate them. People often wonder if taking antibiotics will help with their bronchitis. But the answer is no, because it is usually caused by a virus. Antibiotics kill bacteria, not viruses. The most common symptoms of bronchitis are:  ? A nagging cough that can last up to a few weeks  ? Coughing up mucus that is clear, yellow, or green - Some people think green mucus means you have a bacterial infection. But this is not always true. ? You might also have normal cold or flu symptoms, like a stuffy nose, sore throat, or headache. People with bronchitis do not usually get a fever. The cough can take up to 3 weeks to get better, sometimes even longer. But you should call your doctor or nurse if you have:  ? A fever higher than 100.4°F (38°C)  ? Chest pain when you cough, trouble breathing, or coughing up blood  ? A barking cough that makes it hard to talk  ? A cough and weight loss that you cannot explain  ? Symptoms that are not getting better after 3 weeks  Bronchitis almost always goes away on its own, although it can take a few weeks. Doctors do not usually treat bronchitis with antibiotic medicines.  That's because bronchitis is usually caused by a virus, and

## 2019-06-25 ENCOUNTER — OFFICE VISIT (OUTPATIENT)
Dept: INTERNAL MEDICINE CLINIC | Age: 49
End: 2019-06-25
Payer: COMMERCIAL

## 2019-06-25 VITALS
DIASTOLIC BLOOD PRESSURE: 82 MMHG | HEART RATE: 76 BPM | SYSTOLIC BLOOD PRESSURE: 124 MMHG | TEMPERATURE: 98.3 F | WEIGHT: 189 LBS | BODY MASS INDEX: 35.71 KG/M2 | OXYGEN SATURATION: 98 %

## 2019-06-25 DIAGNOSIS — M19.112 OSTEOARTHRITIS OF LEFT SHOULDER DUE TO ROTATOR CUFF INJURY: Primary | ICD-10-CM

## 2019-06-25 DIAGNOSIS — S46.002S OSTEOARTHRITIS OF LEFT SHOULDER DUE TO ROTATOR CUFF INJURY: Primary | ICD-10-CM

## 2019-06-25 PROCEDURE — 99213 OFFICE O/P EST LOW 20 MIN: CPT | Performed by: INTERNAL MEDICINE

## 2019-06-25 RX ORDER — IBUPROFEN 800 MG/1
800 TABLET ORAL
Qty: 90 TABLET | Refills: 5 | Status: SHIPPED | OUTPATIENT
Start: 2019-06-25 | End: 2021-10-06

## 2019-06-25 ASSESSMENT — ENCOUNTER SYMPTOMS
ALLERGIC/IMMUNOLOGIC NEGATIVE: 1
GASTROINTESTINAL NEGATIVE: 1
RESPIRATORY NEGATIVE: 1
EYES NEGATIVE: 1

## 2019-06-26 ENCOUNTER — PATIENT MESSAGE (OUTPATIENT)
Dept: INTERNAL MEDICINE CLINIC | Age: 49
End: 2019-06-26

## 2019-06-26 DIAGNOSIS — M19.112 OSTEOARTHRITIS OF LEFT SHOULDER DUE TO ROTATOR CUFF INJURY: ICD-10-CM

## 2019-06-26 DIAGNOSIS — M54.42 ACUTE LEFT-SIDED LOW BACK PAIN WITH LEFT-SIDED SCIATICA: Primary | ICD-10-CM

## 2019-06-26 DIAGNOSIS — S46.002S OSTEOARTHRITIS OF LEFT SHOULDER DUE TO ROTATOR CUFF INJURY: ICD-10-CM

## 2019-07-08 ENCOUNTER — TELEPHONE (OUTPATIENT)
Dept: ORTHOPEDIC SURGERY | Age: 49
End: 2019-07-08

## 2019-07-08 DIAGNOSIS — N20.0 NEPHROLITHIASIS: Primary | ICD-10-CM

## 2019-07-10 DIAGNOSIS — M54.42 ACUTE LEFT-SIDED LOW BACK PAIN WITH LEFT-SIDED SCIATICA: ICD-10-CM

## 2019-07-10 DIAGNOSIS — S46.002S OSTEOARTHRITIS OF LEFT SHOULDER DUE TO ROTATOR CUFF INJURY: Primary | ICD-10-CM

## 2019-07-10 DIAGNOSIS — M19.112 OSTEOARTHRITIS OF LEFT SHOULDER DUE TO ROTATOR CUFF INJURY: Primary | ICD-10-CM

## 2019-07-14 DIAGNOSIS — E78.5 DYSLIPIDEMIA: ICD-10-CM

## 2019-07-15 RX ORDER — PRAVASTATIN SODIUM 20 MG
TABLET ORAL
Qty: 30 TABLET | Refills: 3 | Status: SHIPPED | OUTPATIENT
Start: 2019-07-15 | End: 2019-11-05 | Stop reason: SDUPTHER

## 2019-08-02 ENCOUNTER — NURSE TRIAGE (OUTPATIENT)
Dept: OTHER | Facility: CLINIC | Age: 49
End: 2019-08-02

## 2019-08-02 ENCOUNTER — OFFICE VISIT (OUTPATIENT)
Dept: INTERNAL MEDICINE CLINIC | Age: 49
End: 2019-08-02
Payer: COMMERCIAL

## 2019-08-02 VITALS
WEIGHT: 189 LBS | HEART RATE: 72 BPM | BODY MASS INDEX: 35.71 KG/M2 | SYSTOLIC BLOOD PRESSURE: 118 MMHG | DIASTOLIC BLOOD PRESSURE: 74 MMHG

## 2019-08-02 DIAGNOSIS — N20.0 NEPHROLITHIASIS: ICD-10-CM

## 2019-08-02 DIAGNOSIS — R31.1 BENIGN ESSENTIAL MICROSCOPIC HEMATURIA: Primary | ICD-10-CM

## 2019-08-02 DIAGNOSIS — R31.1 BENIGN ESSENTIAL MICROSCOPIC HEMATURIA: ICD-10-CM

## 2019-08-02 LAB
ANION GAP SERPL CALCULATED.3IONS-SCNC: 14 MMOL/L (ref 3–16)
BILIRUBIN, POC: NORMAL
BLOOD URINE, POC: NORMAL
BUN BLDV-MCNC: 8 MG/DL (ref 7–20)
CALCIUM SERPL-MCNC: 10.4 MG/DL (ref 8.3–10.6)
CHLORIDE BLD-SCNC: 101 MMOL/L (ref 99–110)
CLARITY, POC: NORMAL
CO2: 29 MMOL/L (ref 21–32)
COLOR, POC: NORMAL
CREAT SERPL-MCNC: 1.3 MG/DL (ref 0.6–1.1)
GFR AFRICAN AMERICAN: 53
GFR NON-AFRICAN AMERICAN: 44
GLUCOSE BLD-MCNC: 100 MG/DL (ref 70–99)
GLUCOSE URINE, POC: NORMAL
KETONES, POC: NORMAL
LEUKOCYTE EST, POC: NORMAL
NITRITE, POC: NORMAL
PH, POC: 6
POTASSIUM SERPL-SCNC: 4.4 MMOL/L (ref 3.5–5.1)
PROTEIN, POC: NORMAL
SODIUM BLD-SCNC: 144 MMOL/L (ref 136–145)
SPECIFIC GRAVITY, POC: 1.02
UROBILINOGEN, POC: 0.2

## 2019-08-02 PROCEDURE — 99213 OFFICE O/P EST LOW 20 MIN: CPT | Performed by: INTERNAL MEDICINE

## 2019-08-02 PROCEDURE — 81002 URINALYSIS NONAUTO W/O SCOPE: CPT | Performed by: INTERNAL MEDICINE

## 2019-08-02 RX ORDER — CIPROFLOXACIN 500 MG/1
TABLET, FILM COATED ORAL
COMMUNITY
Start: 2019-07-31 | End: 2020-11-06

## 2019-08-02 RX ORDER — MELOXICAM 7.5 MG/1
7.5 TABLET ORAL DAILY
Qty: 30 TABLET | Refills: 5 | Status: SHIPPED | OUTPATIENT
Start: 2019-08-02 | End: 2021-10-06

## 2019-08-02 RX ORDER — HYDROCODONE BITARTRATE AND ACETAMINOPHEN 5; 325 MG/1; MG/1
1 TABLET ORAL EVERY 4 HOURS PRN
Qty: 30 TABLET | Refills: 0 | Status: SHIPPED | OUTPATIENT
Start: 2019-08-02 | End: 2019-08-07

## 2019-08-02 RX ORDER — TRAMADOL HYDROCHLORIDE 50 MG/1
50 TABLET ORAL EVERY 4 HOURS PRN
Qty: 42 TABLET | Refills: 0 | Status: SHIPPED | OUTPATIENT
Start: 2019-08-02 | End: 2019-08-02

## 2019-08-05 ENCOUNTER — HOSPITAL ENCOUNTER (OUTPATIENT)
Dept: ULTRASOUND IMAGING | Age: 49
Discharge: HOME | End: 2019-08-05
Payer: COMMERCIAL

## 2019-08-05 DIAGNOSIS — N20.0 NEPHROLITHIASIS: ICD-10-CM

## 2019-08-05 DIAGNOSIS — R31.1 BENIGN ESSENTIAL MICROSCOPIC HEMATURIA: ICD-10-CM

## 2019-08-05 PROCEDURE — 76770 US EXAM ABDO BACK WALL COMP: CPT

## 2019-08-06 ENCOUNTER — TELEPHONE (OUTPATIENT)
Dept: INTERNAL MEDICINE CLINIC | Age: 49
End: 2019-08-06

## 2019-08-14 ENCOUNTER — TELEPHONE (OUTPATIENT)
Dept: INTERNAL MEDICINE CLINIC | Age: 49
End: 2019-08-14

## 2019-08-14 DIAGNOSIS — R31.1 BENIGN ESSENTIAL MICROSCOPIC HEMATURIA: Primary | ICD-10-CM

## 2019-08-14 LAB
BILIRUBIN, POC: NORMAL
BLOOD URINE, POC: NORMAL
CLARITY, POC: CLEAR
COLOR, POC: YELLOW
GLUCOSE URINE, POC: NORMAL
KETONES, POC: NORMAL
LEUKOCYTE EST, POC: NORMAL
NITRITE, POC: NORMAL
PH, POC: 6
PROTEIN, POC: NORMAL
SPECIFIC GRAVITY, POC: 1.01
UROBILINOGEN, POC: 0.2

## 2019-08-14 PROCEDURE — 81002 URINALYSIS NONAUTO W/O SCOPE: CPT | Performed by: INTERNAL MEDICINE

## 2019-08-20 ENCOUNTER — HOSPITAL ENCOUNTER (OUTPATIENT)
Dept: CT IMAGING | Age: 49
Discharge: HOME | End: 2019-08-20
Payer: COMMERCIAL

## 2019-08-20 DIAGNOSIS — N20.0 NEPHROLITHIASIS: ICD-10-CM

## 2019-08-20 PROCEDURE — 74176 CT ABD & PELVIS W/O CONTRAST: CPT

## 2019-08-22 ENCOUNTER — TELEPHONE (OUTPATIENT)
Dept: INTERNAL MEDICINE CLINIC | Age: 49
End: 2019-08-22

## 2019-08-23 ENCOUNTER — TELEPHONE (OUTPATIENT)
Dept: INTERNAL MEDICINE CLINIC | Age: 49
End: 2019-08-23

## 2019-11-01 ENCOUNTER — NURSE TRIAGE (OUTPATIENT)
Dept: OTHER | Facility: CLINIC | Age: 49
End: 2019-11-01

## 2019-11-05 ENCOUNTER — HOSPITAL ENCOUNTER (OUTPATIENT)
Dept: GENERAL RADIOLOGY | Age: 49
Discharge: HOME | End: 2019-11-05
Payer: COMMERCIAL

## 2019-11-05 ENCOUNTER — OFFICE VISIT (OUTPATIENT)
Dept: INTERNAL MEDICINE CLINIC | Age: 49
End: 2019-11-05
Payer: COMMERCIAL

## 2019-11-05 ENCOUNTER — HOSPITAL ENCOUNTER (OUTPATIENT)
Age: 49
Discharge: HOME | End: 2019-11-05
Payer: COMMERCIAL

## 2019-11-05 VITALS
DIASTOLIC BLOOD PRESSURE: 74 MMHG | SYSTOLIC BLOOD PRESSURE: 126 MMHG | OXYGEN SATURATION: 99 % | HEART RATE: 73 BPM | TEMPERATURE: 98.2 F | BODY MASS INDEX: 35.52 KG/M2 | RESPIRATION RATE: 16 BRPM | WEIGHT: 188 LBS

## 2019-11-05 DIAGNOSIS — F41.9 ANXIETY AND DEPRESSION: ICD-10-CM

## 2019-11-05 DIAGNOSIS — F32.A ANXIETY AND DEPRESSION: ICD-10-CM

## 2019-11-05 DIAGNOSIS — M25.511 ACUTE PAIN OF RIGHT SHOULDER: ICD-10-CM

## 2019-11-05 DIAGNOSIS — E78.5 DYSLIPIDEMIA: ICD-10-CM

## 2019-11-05 DIAGNOSIS — M25.511 ACUTE PAIN OF RIGHT SHOULDER: Primary | ICD-10-CM

## 2019-11-05 PROCEDURE — 73030 X-RAY EXAM OF SHOULDER: CPT

## 2019-11-05 PROCEDURE — 99213 OFFICE O/P EST LOW 20 MIN: CPT | Performed by: INTERNAL MEDICINE

## 2019-11-05 RX ORDER — ACETAMINOPHEN AND CODEINE PHOSPHATE 300; 30 MG/1; MG/1
1 TABLET ORAL EVERY 8 HOURS PRN
Qty: 60 TABLET | Refills: 0 | Status: SHIPPED | OUTPATIENT
Start: 2019-11-05 | End: 2019-11-08

## 2019-11-05 RX ORDER — PRAVASTATIN SODIUM 20 MG
TABLET ORAL
Qty: 30 TABLET | Refills: 3 | Status: SHIPPED | OUTPATIENT
Start: 2019-11-05 | End: 2020-09-03

## 2019-11-05 RX ORDER — METHYLPREDNISOLONE 4 MG/1
TABLET ORAL
Qty: 21 TABLET | Refills: 0 | Status: SHIPPED | OUTPATIENT
Start: 2019-11-05 | End: 2019-11-11

## 2019-11-05 RX ORDER — ACETAMINOPHEN AND CODEINE PHOSPHATE 300; 30 MG/1; MG/1
1 TABLET ORAL EVERY 4 HOURS PRN
Qty: 18 TABLET | Refills: 0 | Status: SHIPPED | OUTPATIENT
Start: 2019-11-05 | End: 2019-11-05

## 2019-11-11 ENCOUNTER — TELEPHONE (OUTPATIENT)
Dept: INTERNAL MEDICINE CLINIC | Age: 49
End: 2019-11-11

## 2020-08-01 ENCOUNTER — TELEPHONE (OUTPATIENT)
Dept: INTERNAL MEDICINE CLINIC | Age: 50
End: 2020-08-01

## 2020-08-10 ENCOUNTER — OFFICE VISIT (OUTPATIENT)
Dept: PRIMARY CARE CLINIC | Age: 50
End: 2020-08-10
Payer: COMMERCIAL

## 2020-08-10 PROCEDURE — 99211 OFF/OP EST MAY X REQ PHY/QHP: CPT | Performed by: NURSE PRACTITIONER

## 2020-08-10 NOTE — PROGRESS NOTES
SALLY Sevillaguy received a viral test for COVID-19. They were educated on isolation and quarantine as appropriate. For any symptoms, they were directed to seek care from their PCP, given contact information to establish with a doctor, directed to an urgent care or the emergency room.

## 2020-08-13 LAB
SARS-COV-2: NOT DETECTED
SOURCE: NORMAL

## 2020-09-03 RX ORDER — PRAVASTATIN SODIUM 20 MG
TABLET ORAL
Qty: 30 TABLET | Refills: 0 | Status: SHIPPED | OUTPATIENT
Start: 2020-09-03 | End: 2020-11-06 | Stop reason: SDUPTHER

## 2020-09-23 ENCOUNTER — TELEPHONE (OUTPATIENT)
Dept: INTERNAL MEDICINE CLINIC | Age: 50
End: 2020-09-23

## 2020-09-23 NOTE — TELEPHONE ENCOUNTER
Pt did not  her script of PRAVACHOL 20 MG from [facility] on 9/3/2020. She said [facility] cancelled the script so she would like a new script sent to the [facility]. I advised pt she will need an appt for additional refills. She said she will call back to RS.

## 2020-11-06 ENCOUNTER — VIRTUAL VISIT (OUTPATIENT)
Dept: INTERNAL MEDICINE CLINIC | Age: 50
End: 2020-11-06
Payer: COMMERCIAL

## 2020-11-06 PROCEDURE — 99213 OFFICE O/P EST LOW 20 MIN: CPT | Performed by: INTERNAL MEDICINE

## 2020-11-06 RX ORDER — PRAVASTATIN SODIUM 20 MG
TABLET ORAL
Qty: 90 TABLET | Refills: 1 | Status: SHIPPED | OUTPATIENT
Start: 2020-11-06 | End: 2021-07-15 | Stop reason: SDUPTHER

## 2020-11-06 ASSESSMENT — PATIENT HEALTH QUESTIONNAIRE - PHQ9
2. FEELING DOWN, DEPRESSED OR HOPELESS: 0
SUM OF ALL RESPONSES TO PHQ9 QUESTIONS 1 & 2: 0
SUM OF ALL RESPONSES TO PHQ QUESTIONS 1-9: 0
1. LITTLE INTEREST OR PLEASURE IN DOING THINGS: 0
SUM OF ALL RESPONSES TO PHQ QUESTIONS 1-9: 0
SUM OF ALL RESPONSES TO PHQ QUESTIONS 1-9: 0

## 2020-11-06 ASSESSMENT — ENCOUNTER SYMPTOMS
RESPIRATORY NEGATIVE: 1
ALLERGIC/IMMUNOLOGIC NEGATIVE: 1
EYES NEGATIVE: 1
GASTROINTESTINAL NEGATIVE: 1

## 2020-11-06 NOTE — PROGRESS NOTES
Subjective:      Patient ID: SALLY Jimenez is a 53 y.o. female. Chief Complaint   Patient presents with    Depression     follow up depression         HPI     SALLY Jimenez is a 53 y.o. female who presents for follow up of depression. Current symptoms include depressed mood. Symptoms have been gradually improving since that time. Patient denies hopelessness, hypersomnia, impaired memory, psychomotor agitation, psychomotor retardation and recurrent thoughts of death. Previous treatment includes: none. Past Medical History:   Diagnosis Date    Depression     High cholesterol     unmedicated due to side-effects    History of palpitations     per pt: refused medication at time of diagnosis    Low back pain      Past Surgical History:   Procedure Laterality Date    UTERINE FIBROID EMBOLIZATION  2012     Family History   Problem Relation Age of Onset    Heart Disease Mother 61    Arthritis Mother     Diabetes Father 61    Heart Disease Father     Kidney Disease Father     Hypertension Brother     Diabetes Brother      Social History     Socioeconomic History    Marital status:      Spouse name: Not on file    Number of children: 0    Years of education: Not on file    Highest education level: Not on file   Occupational History    Occupation: Teacher - [employer]   Social Needs    Financial resource strain: Not on file    Food insecurity     Worry: Not on file     Inability: Not on file    Transportation needs     Medical: Not on file     Non-medical: Not on file   Tobacco Use    Smoking status: Never Smoker    Smokeless tobacco: Never Used   Substance and Sexual Activity    Alcohol use:  Yes     Alcohol/week: 0.0 standard drinks     Comment: Occasionally    Drug use: No    Sexual activity: Yes     Partners: Female   Lifestyle    Physical activity     Days per week: Not on file     Minutes per session: Not on file    Stress: Not on file   Relationships    Social connections (ADVIL;MOTRIN) 600 MG tablet Take 1 tablet by mouth every 6 hours as needed for Pain or Fever 20 tablet 0     No current facility-administered medications for this visit. There were no vitals filed for this visit. There is no height or weight on file to calculate BMI. Wt Readings from Last 3 Encounters:   11/05/19 188 lb (85.3 kg)   08/02/19 189 lb (85.7 kg)   06/25/19 189 lb (85.7 kg)     BP Readings from Last 3 Encounters:   11/05/19 126/74   08/02/19 118/74   06/25/19 124/82       Objective:   Physical Exam  Vitals signs and nursing note reviewed. Constitutional:       General: She is not in acute distress. Appearance: She is well-developed. She is obese. She is not ill-appearing, toxic-appearing or diaphoretic. HENT:      Head: Normocephalic. Eyes:      Pupils: Pupils are equal, round, and reactive to light. Neck:      Musculoskeletal: Normal range of motion. Musculoskeletal: Normal range of motion. Right hip: Normal. She exhibits normal range of motion and normal strength. Left hip: Normal. She exhibits normal range of motion and normal strength. Cervical back: She exhibits deformity. Lumbar back: She exhibits no bony tenderness and no swelling. Skin:     General: Skin is warm. Capillary Refill: Capillary refill takes less than 2 seconds. Neurological:      General: No focal deficit present. Mental Status: She is alert and oriented to person, place, and time. Cranial Nerves: No cranial nerve deficit. Coordination: Coordination normal.   Psychiatric:         Mood and Affect: Mood normal.         Behavior: Behavior normal.         Thought Content: Thought content normal.         Judgment: Judgment normal.       Assessment/Plan:  SALLY Reddy was seen today for depression. Diagnoses and all orders for this visit:    Encounter for health maintenance examination in adult  -     LIPID PANEL; Future  -     COMPREHENSIVE METABOLIC PANEL;  Future  - Hemoglobin A1C; Future    Dyslipidemia  -     pravastatin (PRAVACHOL) 20 MG tablet; 1 tablet at bed time    Anxiety and depression   - patient has stopped medication and states she if feeling somewhat better      Luli Dennison MD       SALLY Carter is a 53 y.o. female being evaluated by a Virtual Visit (video visit) encounter to address concerns as mentioned above. A caregiver was present when appropriate. Due to this being a TeleHealth encounter (During GOXDU-83 public health emergency), evaluation of the following organ systems was limited: Vitals/Constitutional/EENT/Resp/CV/GI//MS/Neuro/Skin/Heme-Lymph-Imm. Pursuant to the emergency declaration under the [address] [place] [address], [address] [place] [address] authority and the [employer] and [employer] Act, this Virtual Visit was conducted with patient's (and/or legal guardian's) consent, to reduce the patient's risk of exposure to COVID-19 and provide necessary medical care. The patient (and/or legal guardian) has also been advised to contact this office for worsening conditions or problems, and seek emergency medical treatment and/or call 911 if deemed necessary. Patient identification was verified at the start of the visit: No    Total time spent for this encounter: Not billed by time    Services were provided through a video synchronous discussion virtually to substitute for in-person clinic visit. Patient and provider were located at their individual homes. --Luli Dennison MD on 11/6/2020 at 10:27 AM    An electronic signature was used to authenticate this note.

## 2020-12-12 DIAGNOSIS — Z00.00 ENCOUNTER FOR HEALTH MAINTENANCE EXAMINATION IN ADULT: ICD-10-CM

## 2020-12-12 LAB
A/G RATIO: 1.7 (ref 1.1–2.2)
ALBUMIN SERPL-MCNC: 4.5 G/DL (ref 3.4–5)
ALP BLD-CCNC: 103 U/L (ref 40–129)
ALT SERPL-CCNC: 13 U/L (ref 10–40)
ANION GAP SERPL CALCULATED.3IONS-SCNC: 9 MMOL/L (ref 3–16)
AST SERPL-CCNC: 20 U/L (ref 15–37)
BILIRUB SERPL-MCNC: 0.4 MG/DL (ref 0–1)
BUN BLDV-MCNC: 9 MG/DL (ref 7–20)
CALCIUM SERPL-MCNC: 10.2 MG/DL (ref 8.3–10.6)
CHLORIDE BLD-SCNC: 102 MMOL/L (ref 99–110)
CHOLESTEROL, TOTAL: 303 MG/DL (ref 0–199)
CO2: 31 MMOL/L (ref 21–32)
CREAT SERPL-MCNC: 1.2 MG/DL (ref 0.6–1.1)
GFR AFRICAN AMERICAN: 58
GFR NON-AFRICAN AMERICAN: 48
GLOBULIN: 2.7 G/DL
GLUCOSE BLD-MCNC: 114 MG/DL (ref 70–99)
HDLC SERPL-MCNC: 42 MG/DL (ref 40–60)
LDL CHOLESTEROL CALCULATED: 238 MG/DL
POTASSIUM SERPL-SCNC: 4.6 MMOL/L (ref 3.5–5.1)
SODIUM BLD-SCNC: 142 MMOL/L (ref 136–145)
TOTAL PROTEIN: 7.2 G/DL (ref 6.4–8.2)
TRIGL SERPL-MCNC: 116 MG/DL (ref 0–150)
VLDLC SERPL CALC-MCNC: 23 MG/DL

## 2020-12-13 LAB
ESTIMATED AVERAGE GLUCOSE: 125.5 MG/DL
HBA1C MFR BLD: 6 %

## 2021-03-02 ENCOUNTER — E-VISIT (OUTPATIENT)
Dept: FAMILY MEDICINE CLINIC | Age: 51
End: 2021-03-02
Payer: COMMERCIAL

## 2021-03-02 DIAGNOSIS — L29.3 GENITAL PRURITUS: ICD-10-CM

## 2021-03-02 DIAGNOSIS — R21 SKIN RASH: Primary | ICD-10-CM

## 2021-03-02 PROCEDURE — 99422 OL DIG E/M SVC 11-20 MIN: CPT | Performed by: FAMILY MEDICINE

## 2021-03-02 RX ORDER — HYDROXYZINE HYDROCHLORIDE 25 MG/1
25 TABLET, FILM COATED ORAL EVERY 8 HOURS PRN
Qty: 30 TABLET | Refills: 0 | Status: SHIPPED | OUTPATIENT
Start: 2021-03-02 | End: 2021-03-12

## 2021-03-03 NOTE — PROGRESS NOTES
The above-named patient has requested evaluation and management services through an electronic visit by way of [employer] powered by [employer] and provided by [facility]. The history of present illness provided by the patient as well as medications, allergies, past medical history have been reviewed in this electronic health record. Following evaluation and management recommendations have been made and communicated to the patient via [employer]:    Good evening, Ms. Ivana kumar. I am sorry you are going through this with this itching and rash. I looked at the pictures you attached. Unfortunately, I am unable to determine the nature of the rash and, therefore, cannot recommend a curative treatment at this time. However, I have sent in a prescription for a stronger antihistamine which will help with the itching. Keep in mind, it may cause sleepiness. Please consult with your physician regarding further evaluation and management of the rash so that you can get healed up. Stay safe. Have peace. Be well. Diagnoses and all orders for this visit:    Skin rash  -     hydrOXYzine (ATARAX) 25 MG tablet; Take 1 tablet by mouth every 8 hours as needed for Itching    Genital pruritus  -     hydrOXYzine (ATARAX) 25 MG tablet; Take 1 tablet by mouth every 8 hours as needed for Itching      11-20 minutes were spent on the digital evaluation and management of this patient.

## 2021-04-14 LAB — PAP SMEAR, EXTERNAL: NORMAL

## 2021-07-15 ENCOUNTER — E-VISIT (OUTPATIENT)
Dept: OTHER | Facility: CLINIC | Age: 51
End: 2021-07-15

## 2021-07-15 ENCOUNTER — OFFICE VISIT (OUTPATIENT)
Dept: INTERNAL MEDICINE CLINIC | Age: 51
End: 2021-07-15
Payer: COMMERCIAL

## 2021-07-15 VITALS
BODY MASS INDEX: 34.39 KG/M2 | SYSTOLIC BLOOD PRESSURE: 124 MMHG | OXYGEN SATURATION: 98 % | WEIGHT: 182 LBS | DIASTOLIC BLOOD PRESSURE: 72 MMHG | HEART RATE: 95 BPM | TEMPERATURE: 97 F

## 2021-07-15 DIAGNOSIS — L81.9 HYPERPIGMENTATION OF SKIN: ICD-10-CM

## 2021-07-15 DIAGNOSIS — M25.531 RIGHT WRIST PAIN: Primary | ICD-10-CM

## 2021-07-15 DIAGNOSIS — Z12.11 SCREEN FOR COLON CANCER: ICD-10-CM

## 2021-07-15 DIAGNOSIS — E78.5 DYSLIPIDEMIA: ICD-10-CM

## 2021-07-15 DIAGNOSIS — L30.9 DERMATITIS: ICD-10-CM

## 2021-07-15 PROCEDURE — 99214 OFFICE O/P EST MOD 30 MIN: CPT | Performed by: NURSE PRACTITIONER

## 2021-07-15 RX ORDER — CLOBETASOL PROPIONATE 0.5 MG/G
OINTMENT TOPICAL
Qty: 30 G | Refills: 1 | Status: SHIPPED | OUTPATIENT
Start: 2021-07-15

## 2021-07-15 RX ORDER — PRAVASTATIN SODIUM 20 MG
TABLET ORAL
Qty: 90 TABLET | Refills: 1 | Status: SHIPPED | OUTPATIENT
Start: 2021-07-15 | End: 2021-12-29

## 2021-07-15 SDOH — ECONOMIC STABILITY: TRANSPORTATION INSECURITY
IN THE PAST 12 MONTHS, HAS THE LACK OF TRANSPORTATION KEPT YOU FROM MEDICAL APPOINTMENTS OR FROM GETTING MEDICATIONS?: NO

## 2021-07-15 SDOH — ECONOMIC STABILITY: FOOD INSECURITY: WITHIN THE PAST 12 MONTHS, THE FOOD YOU BOUGHT JUST DIDN'T LAST AND YOU DIDN'T HAVE MONEY TO GET MORE.: NEVER TRUE

## 2021-07-15 SDOH — ECONOMIC STABILITY: FOOD INSECURITY: WITHIN THE PAST 12 MONTHS, YOU WORRIED THAT YOUR FOOD WOULD RUN OUT BEFORE YOU GOT MONEY TO BUY MORE.: NEVER TRUE

## 2021-07-15 SDOH — ECONOMIC STABILITY: TRANSPORTATION INSECURITY
IN THE PAST 12 MONTHS, HAS LACK OF TRANSPORTATION KEPT YOU FROM MEETINGS, WORK, OR FROM GETTING THINGS NEEDED FOR DAILY LIVING?: NO

## 2021-07-15 ASSESSMENT — PATIENT HEALTH QUESTIONNAIRE - PHQ9
SUM OF ALL RESPONSES TO PHQ9 QUESTIONS 1 & 2: 0
1. LITTLE INTEREST OR PLEASURE IN DOING THINGS: 0
2. FEELING DOWN, DEPRESSED OR HOPELESS: 0
SUM OF ALL RESPONSES TO PHQ QUESTIONS 1-9: 0

## 2021-07-15 ASSESSMENT — ENCOUNTER SYMPTOMS
ALLERGIC/IMMUNOLOGIC NEGATIVE: 1
EYES NEGATIVE: 1
GASTROINTESTINAL NEGATIVE: 1
RESPIRATORY NEGATIVE: 1

## 2021-07-15 ASSESSMENT — SOCIAL DETERMINANTS OF HEALTH (SDOH): HOW HARD IS IT FOR YOU TO PAY FOR THE VERY BASICS LIKE FOOD, HOUSING, MEDICAL CARE, AND HEATING?: NOT HARD AT ALL

## 2021-07-15 NOTE — PATIENT INSTRUCTIONS
Take Ibuprofen every day with food  Soak right wrist in warm water of use warm compress for 15 minutes  Keep area on upper chest  moist between ointment applications  Referral to dermatology

## 2021-07-15 NOTE — PROGRESS NOTES
SALLY Hooker (:  1970) is a 53 y.o. female,Established patient, here for evaluation of the following chief complaint(s):  Rash (couple weeks/itching ), Joint Swelling, and Acne         ASSESSMENT/PLAN:  1. Dyslipidemia  -     pravastatin (PRAVACHOL) 20 MG tablet; 1 tablet at bed time, Disp-90 tablet, R-1Pt needs appt for additional refillsNormal  Right wrist pain    Take Ibuprofen every day with food  Soak right wrist in warm water of use warm compress for 15 minutes    Dermatitis  Keep area on upper chest  moist between ointment applications  Referral to dermatology  No follow-ups on file. Subjective   SUBJECTIVE/OBJECTIVE:  Rash  This is a new problem. The current episode started in the past 7 days. The problem is unchanged. The affected locations include the chest. The rash is characterized by dryness, itchiness and redness. She was exposed to nothing. Past treatments include nothing. The treatment provided no relief. Wrist Pain   The pain is present in the right wrist. This is a new problem. The current episode started more than 1 month ago. There has been no history of extremity trauma. The problem occurs constantly. The problem has been waxing and waning. The quality of the pain is described as pounding and burning. The pain is at a severity of 4/10. The pain is moderate. The symptoms are aggravated by activity. She has tried nothing for the symptoms. The treatment provided no relief. Review of Systems   Constitutional: Negative. HENT: Negative. Eyes: Negative. Respiratory: Negative. Cardiovascular: Negative. Gastrointestinal: Negative. Endocrine: Negative. Genitourinary: Negative. Musculoskeletal: Positive for joint swelling. Skin: Positive for rash. Allergic/Immunologic: Negative. Neurological: Negative. Hematological: Negative. Psychiatric/Behavioral: Negative.       Vitals:    07/15/21 1542   BP: 124/72   Pulse: 95   Temp: 97 °F (36.1 °C) SpO2: 98%     BP Readings from Last 3 Encounters:   07/15/21 124/72   11/05/19 126/74   08/02/19 118/74          Objective   Physical Exam  Constitutional:       Appearance: Normal appearance. She is obese. Cardiovascular:      Rate and Rhythm: Normal rate and regular rhythm. Heart sounds: Normal heart sounds. Pulmonary:      Effort: Pulmonary effort is normal.      Breath sounds: Normal breath sounds and air entry. Chest:       Lymphadenopathy:      Cervical: No cervical adenopathy. Right cervical: No superficial, deep or posterior cervical adenopathy. Left cervical: No superficial, deep or posterior cervical adenopathy. Skin:     General: Skin is warm. Findings: Erythema and rash present. Rash is papular and scaling. Comments: Dry hyperpigmented areas noted on face from old scars, second to have been removed   Neurological:      Mental Status: She is alert and oriented to person, place, and time. An electronic signature was used to authenticate this note.     --Jaimie Chavez, BESSIE - CNP

## 2021-07-16 ENCOUNTER — HOSPITAL ENCOUNTER (OUTPATIENT)
Age: 51
Discharge: HOME | End: 2021-07-16
Payer: COMMERCIAL

## 2021-07-16 ENCOUNTER — HOSPITAL ENCOUNTER (OUTPATIENT)
Dept: GENERAL RADIOLOGY | Age: 51
Discharge: HOME | End: 2021-07-16
Payer: COMMERCIAL

## 2021-07-16 DIAGNOSIS — M25.531 RIGHT WRIST PAIN: ICD-10-CM

## 2021-07-16 PROCEDURE — 73110 X-RAY EXAM OF WRIST: CPT

## 2021-10-06 ENCOUNTER — TELEPHONE (OUTPATIENT)
Dept: PRIMARY CARE CLINIC | Age: 51
End: 2021-10-06

## 2021-10-06 ENCOUNTER — VIRTUAL VISIT (OUTPATIENT)
Dept: PRIMARY CARE CLINIC | Age: 51
End: 2021-10-06
Payer: COMMERCIAL

## 2021-10-06 DIAGNOSIS — B02.9 HERPES ZOSTER WITHOUT COMPLICATION: Primary | ICD-10-CM

## 2021-10-06 PROCEDURE — 99213 OFFICE O/P EST LOW 20 MIN: CPT | Performed by: NURSE PRACTITIONER

## 2021-10-06 RX ORDER — VALACYCLOVIR HYDROCHLORIDE 1 G/1
1000 TABLET, FILM COATED ORAL 3 TIMES DAILY
Qty: 21 TABLET | Refills: 0 | Status: SHIPPED | OUTPATIENT
Start: 2021-10-06 | End: 2021-10-13

## 2021-10-06 RX ORDER — LIDOCAINE 50 MG/G
1 PATCH TOPICAL DAILY
Qty: 20 PATCH | Refills: 0 | Status: SHIPPED | OUTPATIENT
Start: 2021-10-06 | End: 2021-10-26

## 2021-10-06 NOTE — PATIENT INSTRUCTIONS
Patient Education        Shingles: Care Instructions  Your Care Instructions     Shingles (herpes zoster) causes pain and a blistered rash. The rash can appear anywhere on the body but will be on only one side of the body, the left or right. It will be in a band, a strip, or a small area. The pain can be very severe. Shingles can also cause tingling or itching in the area of the rash. The blisters scab over after a few days and heal in 2 to 4 weeks. Medicines can help you feel better and may help prevent more serious problems caused by shingles. Shingles is caused by the same virus that causes chickenpox. When you have chickenpox, the virus gets into your nerve roots and stays there (becomes dormant) long after you get over the chickenpox. If the virus becomes active again, it can cause shingles. Follow-up care is a key part of your treatment and safety. Be sure to make and go to all appointments, and call your doctor if you are having problems. It's also a good idea to know your test results and keep a list of the medicines you take. How can you care for yourself at home? · Be safe with medicines. Take your medicines exactly as prescribed. Call your doctor if you think you are having a problem with your medicine. Antiviral medicine helps you get better faster. · Try not to scratch or pick at the blisters. They will crust over and fall off on their own if you leave them alone. · Put cool, wet cloths on the area to relieve pain and itching. You can also use calamine lotion. Try not to use so much lotion that it cakes and is hard to get off. · Put cornstarch or baking soda on the sores to help dry them out so they heal faster. · Do not use thick ointment, such as petroleum jelly, on the sores. This will keep them from drying and healing. · To help remove loose crusts, soak them in tap water. This can help decrease oozing, and dry and soothe the skin.   · Take an over-the-counter pain medicine, such as acetaminophen (Tylenol), ibuprofen (Advil, Motrin), or naproxen (Aleve). Read and follow all instructions on the label. · Avoid close contact with people until the blisters have healed. It is very important for you to avoid contact with anyone who has never had chickenpox or the chickenpox vaccine. Pregnant women, young babies, and anyone else who has a hard time fighting infection (such as someone with HIV, diabetes, or cancer) is especially at risk. When should you call for help? Call your doctor now or seek immediate medical care if:    · You have a new or higher fever.     · You have a severe headache and a stiff neck.     · You lose the ability to think clearly.     · The rash spreads to your forehead, nose, eyes, or eyelids.     · You have eye pain, or your vision gets worse.     · You have new pain in your face, or you cannot move the muscles in your face.     · Blisters spread to new parts of your body. Watch closely for changes in your health, and be sure to contact your doctor if:    · The rash has not healed after 2 to 4 weeks.     · You still have pain after the rash has healed. Where can you learn more? Go to https://Unsocialpepiceweb.[employer]. org and sign in to your [employer] account. Efrain Thomas in the [facility] box to learn more about \"Shingles: Care Instructions. \"     If you do not have an account, please click on the \"Sign Up Now\" link. Current as of: July 1, 2021               Content Version: 13.0  © 2006-2021 [employer]. Care instructions adapted under license by [facility] ([facility]). If you have questions about a medical condition or this instruction, always ask your healthcare professional. [place] [address] any warranty or liability for your use of this information.

## 2021-10-06 NOTE — PROGRESS NOTES
10/6/2021    TELEHEALTH EVALUATION -- Audio/Visual (During RII-56 public health emergency)    Chief Complaint   Patient presents with    Rash     left upper arm lasting 4 days now         HPI:    SALLY Bryson Diguy (: 1970) has requested an audio/video evaluation for the following concern(s): Rash to left upper arm. Onset was 4 days ago and has gotten worse since onset with localized spreading. Patient reports rash is red, painful blisters, and warm to the touch. Reports history of chicken pox as a child. She has not been vaccinated against shingles. Review of Systems:  Gen: Denies fever, chills, headaches. No weight loss. Eating and drinking to baseline. HEENT: Denies sore throat. CV:  Denies chest pain or tightness, palpitations. Pulm: Denies shortness of breath, cough. Abd: Denies abdominal pain, change in bowel habits. Skin: Reports painful rash. Denies drainage or red streaking from rash    Current Outpatient Medications on File Prior to Visit   Medication Sig Dispense Refill    pravastatin (PRAVACHOL) 20 MG tablet 1 tablet at bed time 90 tablet 1    clobetasol (TEMOVATE) 0.05 % ointment Apply topically 2 times daily. 30 g 1     No current facility-administered medications on file prior to visit.         Past Medical History:   Diagnosis Date    Depression     High cholesterol     unmedicated due to side-effects    History of palpitations     per pt: refused medication at time of diagnosis    Low back pain        Past Surgical History:   Procedure Laterality Date    UTERINE FIBROID EMBOLIZATION         Family History   Problem Relation Age of Onset    Heart Disease Mother 61    Arthritis Mother     Diabetes Father 61    Heart Disease Father     Kidney Disease Father     Hypertension Brother     Diabetes Brother        Allergies   Allergen Reactions    Latex Hives    Fluconazole In Dextrose      Vaginal burning    Iodine     Tramadol Itching       Social History     Tobacco Use    Smoking status: Never Smoker    Smokeless tobacco: Never Used   Substance Use Topics    Alcohol use: Yes     Alcohol/week: 0.0 standard drinks     Comment: Occasionally    Drug use: No          PHYSICAL EXAMINATION:  Vital Signs: (As obtained by patient/caregiver or practitioner observation)  There were no vitals taken for this visit. Patient-Reported Vitals 10/6/2021   Patient-Reported Weight 182lb   Patient-Reported Height 5'1   Patient-Reported Systolic -   Patient-Reported Diastolic -   Patient-Reported Temperature -        Respiratory rate appears normal    Constitutional: Appears well-developed and well-nourished. No apparent distress    Mental status: Alert and awake. Oriented to person/place/time. Able to follow commands    Eyes: EOM normal. Sclera normal. No discharge visible  HENT: Normocephalic, atraumatic. Neck: No visualized mass   Pulmonary/Chest: Respiratory effort normal.  No visualized signs of difficulty breathing or respiratory distress. Speaking in full sentences without difficulty. Musculoskeletal: Normal range of motion of neck  Neurological: No Facial Asymmetry (Cranial nerve 7 motor function) (limited exam to video visit). No gaze palsy       Skin: Erythematous blistered rash to left upper arm following along dermatone. No sign of secondary infection. Psychiatric: Normal Affect. No Hallucinations                  ASSESSMENT/PLAN:  1. Herpes zoster without complication  - Acute. - Start valACYclovir (VALTREX) 1 g tablet; Take 1 tablet by mouth 3 times daily for 7 days  Dispense: 21 tablet; Refill: 0  - Start lidocaine (LIDODERM) 5 %; Place 1 patch onto the skin daily for 20 days 12 hours on, 12 hours off. Dispense: 20 patch; Refill: 0   - Instructed patient no to apply lidocaine patch to open blisters, only intact skin; patient verbalizes understanding. Return if symptoms worsen or fail to improve.     Current Outpatient Medications   Medication Sig Dispense Refill    valACYclovir (VALTREX) 1 g tablet Take 1 tablet by mouth 3 times daily for 7 days 21 tablet 0    lidocaine (LIDODERM) 5 % Place 1 patch onto the skin daily for 20 days 12 hours on, 12 hours off. 20 patch 0    pravastatin (PRAVACHOL) 20 MG tablet 1 tablet at bed time 90 tablet 1    clobetasol (TEMOVATE) 0.05 % ointment Apply topically 2 times daily. 30 g 1     No current facility-administered medications for this visit. SALLY Fong is a 49 y.o. female being evaluated by a Virtual Visit (video visit) encounter to address concerns as mentioned above. A caregiver was present when appropriate. Due to this being a TeleHealth encounter (During VXLVX-76 public health emergency), evaluation of the following organ systems was limited: Vitals/Constitutional/EENT/Resp/CV/GI//MS/Neuro/Skin/Heme-Lymph-Imm. Pursuant to the emergency declaration under the [address] [place] [address] authority and the [employer] Act, this Virtual Visit was conducted with patient's (and/or legal guardian's) consent, to reduce the patient's risk of exposure to COVID-19 and provide necessary medical care. The patient (and/or legal guardian) has also been advised to contact this office for worsening conditions or problems, and seek emergency medical treatment and/or call 911 if deemed necessary. Patient identification was verified at the start of the visit: Yes    Total time spent on this encounter: 10 minutes. Services were provided through a video synchronous discussion virtually to substitute for in-person clinic visit. Patient was located in their home. Provider was located in the office. --BESSIE Priec CNP on 10/6/2021 at 11:07 AM    An electronic signature was used to authenticate this note. This dictation was generated by voice recognition computer software.  Although all attempts are made to edit the dictation for accuracy, there may be errors in the transcription that are not intended.

## 2021-10-07 NOTE — TELEPHONE ENCOUNTER
Submitted PA for Lidocaine 5% patches  Via CM [id number] STATUS: DENIED. Letter attached. Please notify patient. Thank you.

## 2021-10-13 NOTE — TELEPHONE ENCOUNTER
Please notify patient lidocaine patches were denied by her insurance. She can either pay out-of-pocket using [employer] at a reduced price for a couple of patches or she can purchase lidocaine patches over-the-counter.

## 2021-12-27 ENCOUNTER — HOSPITAL ENCOUNTER (EMERGENCY)
Age: 51
Discharge: HOME | End: 2021-12-27
Attending: EMERGENCY MEDICINE
Payer: COMMERCIAL

## 2021-12-27 ENCOUNTER — APPOINTMENT (OUTPATIENT)
Dept: GENERAL RADIOLOGY | Age: 51
End: 2021-12-27
Payer: COMMERCIAL

## 2021-12-27 ENCOUNTER — APPOINTMENT (OUTPATIENT)
Dept: CT IMAGING | Age: 51
End: 2021-12-27
Payer: COMMERCIAL

## 2021-12-27 ENCOUNTER — NURSE TRIAGE (OUTPATIENT)
Dept: OTHER | Facility: CLINIC | Age: 51
End: 2021-12-27

## 2021-12-27 VITALS
TEMPERATURE: 99 F | WEIGHT: 177 LBS | RESPIRATION RATE: 18 BRPM | BODY MASS INDEX: 33.42 KG/M2 | HEART RATE: 73 BPM | DIASTOLIC BLOOD PRESSURE: 93 MMHG | SYSTOLIC BLOOD PRESSURE: 177 MMHG | OXYGEN SATURATION: 100 % | HEIGHT: 61 IN

## 2021-12-27 DIAGNOSIS — R00.2 PALPITATIONS: Primary | ICD-10-CM

## 2021-12-27 LAB
A/G RATIO: 1.2 (ref 1.1–2.2)
ALBUMIN SERPL-MCNC: 4.1 G/DL (ref 3.4–5)
ALP BLD-CCNC: 95 U/L (ref 40–129)
ALT SERPL-CCNC: 19 U/L (ref 10–40)
ANION GAP SERPL CALCULATED.3IONS-SCNC: 13 MMOL/L (ref 3–16)
AST SERPL-CCNC: 22 U/L (ref 15–37)
BASOPHILS ABSOLUTE: 0.1 K/UL (ref 0–0.2)
BASOPHILS RELATIVE PERCENT: 0.9 %
BILIRUB SERPL-MCNC: 0.3 MG/DL (ref 0–1)
BUN BLDV-MCNC: 8 MG/DL (ref 7–20)
CALCIUM SERPL-MCNC: 10.2 MG/DL (ref 8.3–10.6)
CHLORIDE BLD-SCNC: 101 MMOL/L (ref 99–110)
CO2: 24 MMOL/L (ref 21–32)
CREAT SERPL-MCNC: 1.1 MG/DL (ref 0.6–1.1)
D DIMER: 255 NG/ML DDU (ref 0–229)
EOSINOPHILS ABSOLUTE: 0.2 K/UL (ref 0–0.6)
EOSINOPHILS RELATIVE PERCENT: 2.8 %
GFR AFRICAN AMERICAN: >60
GFR NON-AFRICAN AMERICAN: 52
GLUCOSE BLD-MCNC: 96 MG/DL (ref 70–99)
HCG QUALITATIVE: NEGATIVE
HCT VFR BLD CALC: 41.9 % (ref 36–48)
HEMOGLOBIN: 14 G/DL (ref 12–16)
LYMPHOCYTES ABSOLUTE: 1.6 K/UL (ref 1–5.1)
LYMPHOCYTES RELATIVE PERCENT: 26.7 %
MCH RBC QN AUTO: 29.2 PG (ref 26–34)
MCHC RBC AUTO-ENTMCNC: 33.4 G/DL (ref 31–36)
MCV RBC AUTO: 87.5 FL (ref 80–100)
MONOCYTES ABSOLUTE: 0.3 K/UL (ref 0–1.3)
MONOCYTES RELATIVE PERCENT: 5.8 %
NEUTROPHILS ABSOLUTE: 3.8 K/UL (ref 1.7–7.7)
NEUTROPHILS RELATIVE PERCENT: 63.8 %
PDW BLD-RTO: 15 % (ref 12.4–15.4)
PLATELET # BLD: 305 K/UL (ref 135–450)
PMV BLD AUTO: 8.7 FL (ref 5–10.5)
POTASSIUM REFLEX MAGNESIUM: 3.7 MMOL/L (ref 3.5–5.1)
PRO-BNP: 36 PG/ML (ref 0–124)
RBC # BLD: 4.79 M/UL (ref 4–5.2)
SODIUM BLD-SCNC: 138 MMOL/L (ref 136–145)
TOTAL PROTEIN: 7.5 G/DL (ref 6.4–8.2)
TROPONIN: <0.01 NG/ML
WBC # BLD: 6 K/UL (ref 4–11)

## 2021-12-27 PROCEDURE — 85379 FIBRIN DEGRADATION QUANT: CPT

## 2021-12-27 PROCEDURE — 71045 X-RAY EXAM CHEST 1 VIEW: CPT

## 2021-12-27 PROCEDURE — 85025 COMPLETE CBC W/AUTO DIFF WBC: CPT

## 2021-12-27 PROCEDURE — 96375 TX/PRO/DX INJ NEW DRUG ADDON: CPT

## 2021-12-27 PROCEDURE — 6360000002 HC RX W HCPCS: Performed by: PHYSICIAN ASSISTANT

## 2021-12-27 PROCEDURE — 6360000004 HC RX CONTRAST MEDICATION: Performed by: PHYSICIAN ASSISTANT

## 2021-12-27 PROCEDURE — 96374 THER/PROPH/DIAG INJ IV PUSH: CPT

## 2021-12-27 PROCEDURE — 84703 CHORIONIC GONADOTROPIN ASSAY: CPT

## 2021-12-27 PROCEDURE — 84484 ASSAY OF TROPONIN QUANT: CPT

## 2021-12-27 PROCEDURE — 84443 ASSAY THYROID STIM HORMONE: CPT

## 2021-12-27 PROCEDURE — 93005 ELECTROCARDIOGRAM TRACING: CPT | Performed by: PHYSICIAN ASSISTANT

## 2021-12-27 PROCEDURE — 71260 CT THORAX DX C+: CPT

## 2021-12-27 PROCEDURE — 83880 ASSAY OF NATRIURETIC PEPTIDE: CPT

## 2021-12-27 PROCEDURE — 99283 EMERGENCY DEPT VISIT LOW MDM: CPT

## 2021-12-27 PROCEDURE — 80053 COMPREHEN METABOLIC PANEL: CPT

## 2021-12-27 RX ORDER — METHYLPREDNISOLONE SODIUM SUCCINATE 125 MG/2ML
125 INJECTION, POWDER, LYOPHILIZED, FOR SOLUTION INTRAMUSCULAR; INTRAVENOUS ONCE
Status: COMPLETED | OUTPATIENT
Start: 2021-12-27 | End: 2021-12-27

## 2021-12-27 RX ORDER — DIPHENHYDRAMINE HYDROCHLORIDE 50 MG/ML
25 INJECTION INTRAMUSCULAR; INTRAVENOUS ONCE
Status: COMPLETED | OUTPATIENT
Start: 2021-12-27 | End: 2021-12-27

## 2021-12-27 RX ADMIN — DIPHENHYDRAMINE HYDROCHLORIDE 25 MG: 50 INJECTION, SOLUTION INTRAMUSCULAR; INTRAVENOUS at 19:22

## 2021-12-27 RX ADMIN — IOPAMIDOL 75 ML: 755 INJECTION, SOLUTION INTRAVENOUS at 19:23

## 2021-12-27 RX ADMIN — METHYLPREDNISOLONE SODIUM SUCCINATE 125 MG: 125 INJECTION, POWDER, FOR SOLUTION INTRAMUSCULAR; INTRAVENOUS at 19:22

## 2021-12-27 ASSESSMENT — ENCOUNTER SYMPTOMS
ABDOMINAL PAIN: 0
COLOR CHANGE: 0
COUGH: 0
VOMITING: 0
NAUSEA: 0
DIARRHEA: 0
SHORTNESS OF BREATH: 1
BACK PAIN: 0
CHEST TIGHTNESS: 1
CONSTIPATION: 0

## 2021-12-27 NOTE — ED PROVIDER NOTES
[facility]        Pt Name: Jaime Artis  MRN: [id number]  [place] 1970  Date of evaluation: 12/27/2021  Provider: GARRISON Philippe  PCP: Denny Palmer MD  Note Started: 5:48 PM EST        I have seen and evaluated this patient with my supervising physician Ruel Stallworth MD.    [facility]       Chief Complaint   Patient presents with    Palpitations     cardiac arrhythmia; started a few days ago; heart murmur 10-15  yrs ago       HISTORY OF PRESENT ILLNESS   (Location, Timing/Onset, Context/Setting, Quality, Duration, Modifying Factors, Severity, Associated Signs and Symptoms)  Note limiting factors. Chief Complaint: Palpitations    SALLY Alvarez is a 49 y.o. female with past medical depression, hyperlipidemia who presents to the ED with complaint of palpitations. Patient dates for the past 2 to 3 days she has had feelings where she feels her heart is beating irregular. Patient states she has had some heaviness in her chest and also feeling winded. She became concerned and came to the ED for further evaluation treatment. She had a heart murmur about 10 to 15 years ago where she states she wore a Holter monitor. She states she has not had recurrence until about 2 to 3 days ago. She denies any syncope, near syncope, diaphoresis, headache, lightheadedness/dizziness, pleuritic pain, orthopnea, pedal edema or calf tenderness. Denies cough, hemoptysis, abdominal pain, nausea/vomiting, urinary symptoms or changes in bowel movements. She denies any birth control or hormone usage. Denies history of DVT or PE. Denies recent travel, trips, surgery or immobilization. She states she is not a smoker. She denies history of thyroid problems. She denies any recent illness. Denies any recent COVID-19 exposure.   She became concerned and came to the ED for further evaluation and treatment    Nursing Notes were all reviewed and agreed with or any disagreements were addressed in the HPI. REVIEW OF SYSTEMS    (2-9 systems for level 4, 10 or more for level 5)     Review of Systems   Constitutional: Negative for activity change, appetite change, chills, diaphoresis, fatigue and fever. Respiratory: Positive for chest tightness and shortness of breath. Negative for cough. Cardiovascular: Positive for palpitations. Negative for chest pain and leg swelling. Gastrointestinal: Negative for abdominal pain, constipation, diarrhea, nausea and vomiting. Genitourinary: Negative for decreased urine volume, difficulty urinating, dysuria, flank pain, frequency, hematuria and urgency. Musculoskeletal: Negative for arthralgias, back pain, myalgias, neck pain and neck stiffness. Skin: Negative for color change, pallor, rash and wound. Neurological: Negative for dizziness, tremors, seizures, syncope, facial asymmetry, speech difficulty, weakness, light-headedness, numbness and headaches. Positives and Pertinent negatives as per HPI. Except as noted above in the ROS, all other systems were reviewed and negative. PAST MEDICAL HISTORY     Past Medical History:   Diagnosis Date    Depression     High cholesterol     unmedicated due to side-effects    History of palpitations     per pt: refused medication at time of diagnosis    Low back pain          SURGICAL HISTORY     Past Surgical History:   Procedure Laterality Date    UTERINE FIBROID EMBOLIZATION  2012         CURRENTMEDICATIONS       Previous Medications    CLOBETASOL (TEMOVATE) 0.05 % OINTMENT    Apply topically 2 times daily.     PRAVASTATIN (PRAVACHOL) 20 MG TABLET    1 tablet at bed time         ALLERGIES     Latex, Fluconazole in dextrose, Iodine, and Tramadol    FAMILYHISTORY       Family History   Problem Relation Age of Onset    Heart Disease Mother 61    Arthritis Mother     Diabetes Father 61    Heart Disease Father     Kidney Disease Father    Josh Tang Hypertension Brother     Diabetes Brother           SOCIAL HISTORY       Social History     Tobacco Use    Smoking status: Never Smoker    Smokeless tobacco: Never Used   Substance Use Topics    Alcohol use: Yes     Alcohol/week: 0.0 standard drinks     Comment: Occasionally    Drug use: No       SCREENINGS             PHYSICAL EXAM    (up to 7 for level 4, 8 or more for level 5)     ED Triage Vitals   BP Temp Temp Source Pulse Resp SpO2 Height Weight   12/27/21 1725 12/27/21 1722 12/27/21 1722 12/27/21 1722 12/27/21 1722 12/27/21 1722 12/27/21 1722 12/27/21 1722   (!) 177/93 99 °F (37.2 °C) Oral 73 18 100 % 5' 1\" (1.549 m) 177 lb (80.3 kg)       Physical Exam  Constitutional:       General: She is not in acute distress. Appearance: Normal appearance. She is well-developed. She is not ill-appearing, toxic-appearing or diaphoretic. HENT:      Head: Normocephalic and atraumatic. Right Ear: External ear normal.      Left Ear: External ear normal.   Eyes:      General:         Right eye: No discharge. Left eye: No discharge. Conjunctiva/sclera: Conjunctivae normal.   Cardiovascular:      Rate and Rhythm: Normal rate and regular rhythm. Pulses: Normal pulses. Heart sounds: Normal heart sounds. No murmur heard. No friction rub. No gallop. Comments: 2+ radial pulses bilaterally. No pedal edema. No calf tenderness. No JVD  Pulmonary:      Effort: Pulmonary effort is normal. No respiratory distress. Breath sounds: Normal breath sounds. No stridor. No wheezing, rhonchi or rales. Chest:      Chest wall: No tenderness. Abdominal:      General: Abdomen is flat. There is no distension. Palpations: Abdomen is soft. There is no mass. Tenderness: There is no abdominal tenderness. There is no right CVA tenderness, left CVA tenderness, guarding or rebound. Hernia: No hernia is present. Musculoskeletal:         General: Normal range of motion.       Cervical back: Normal range of motion and neck supple. No rigidity or tenderness. Lymphadenopathy:      Cervical: No cervical adenopathy. Skin:     General: Skin is warm and dry. Coloration: Skin is not pale. Findings: No erythema or rash. Neurological:      Mental Status: She is alert and oriented to person, place, and time. Psychiatric:         Behavior: Behavior normal.         DIAGNOSTIC RESULTS   LABS:    Labs Reviewed   COMPREHENSIVE METABOLIC PANEL W/ REFLEX TO MG FOR LOW K - Abnormal; Notable for the following components:       Result Value    GFR Non- 52 (*)     All other components within normal limits    Narrative:     Performed at:  [facility] [email or web address], [place]   Phone [phone]   D-DIMER, QUANTITATIVE - Abnormal; Notable for the following components:    D-Dimer, Quant 255 (*)     All other components within normal limits    Narrative:     Performed at:  [facility] [address], [place]   Phone [phone]   CBC WITH AUTO DIFFERENTIAL    Narrative:     Performed at:  [facility] [address], [place]   Phone [phone]   TROPONIN    Narrative:     Performed at:  [facility] [address], [place]   Phone [phone]   BRAIN NATRIURETIC PEPTIDE    Narrative:     Performed at:  [facility] [address], [place]   Phone [phone]   HCG, SERUM, QUALITATIVE    Narrative:     Performed at:  [facility] [address]   Phone [phone]   TSH WITH REFLEX       When ordered only abnormal lab results are displayed. All other labs were within normal range or not returned as of this dictation. EKG:  When ordered, EKG's are interpreted by the Emergency Department Physician in the absence of a cardiologist.  Please see their note for interpretation of EKG. RADIOLOGY:   Non-plain film images such as CT, Ultrasound and MRI are read by the radiologist. Plain radiographic images are visualized and preliminarily interpreted by the ED Provider with the below findings:        Interpretation per the Radiologist below, if available at the time of this note:    CT CHEST PULMONARY EMBOLISM W CONTRAST   Final Result   No evidence of pulmonary embolism or acute pulmonary abnormality. RECOMMENDATIONS:   Unavailable         XR CHEST PORTABLE   Final Result   No acute cardiopulmonary disease. No results found. PROCEDURES   Unless otherwise noted below, none     Procedures    CRITICAL CARE TIME   N/A    CONSULTS:  None      EMERGENCY DEPARTMENT COURSE and DIFFERENTIAL DIAGNOSIS/MDM:   Vitals:    Vitals:    12/27/21 1722 12/27/21 1725   BP:  (!) 177/93   Pulse: 73    Resp: 18    Temp: 99 °F (37.2 °C)    TempSrc: Oral    SpO2: 100%    Weight: 177 lb (80.3 kg)    Height: 5' 1\" (1.549 m)        Patient was given the following medications:  Medications   diphenhydrAMINE (BENADRYL) injection 25 mg (25 mg IntraVENous Given 12/27/21 1922)   methylPREDNISolone sodium (SOLU-MEDROL) injection 125 mg (125 mg IntraVENous Given 12/27/21 1922)   iopamidol (ISOVUE-370) 76 % injection 75 mL (75 mLs IntraVENous Given 12/27/21 1923)           Patient is a 71-year-old female who presents to the ED with complaint of palpitations. Patient plaint of palpitations and ongoing for the past several days. Upon examination patient slightly hypertensive but remaining vital signs unremarkable. IV established and blood work obtained. CBC showed white count, hemoglobin and platelets. CMP unremarkable. Troponin was normal.  BMP unremarkable. TSH is pending. Pregnancy was negative. Chest x-ray unremarkable. EKG interpreted by attending. D-dimer obtained and shown to be elevated at 255.   CT of the chest obtained. She has intolerance to IV contrast.  Was given Benadryl and Solu-Medrol here in the emergency department for premedication. CT of the chest showed no acute abnormality. She suffered from palpitations. Reassuring work-up here in the ED and believe to be safely discharged home with close outpatient follow-up. Referral to PCP and cardiology. Return the ED for new or worsening symptoms. Low suspicion for significant electrolyte abnormality, significant cardiac arrhythmia, ACS, PE, dissection, AAA, pneumonia, pneumothorax, respiratory distress, GERD, anxiety, CHF, COPD, asthma, surgical abdomen or other emergent etiology at this time. FINAL IMPRESSION      1. Palpitations          DISPOSITION/PLAN   DISPOSITION Decision To Discharge 12/27/2021 08:43:49 PM      PATIENT REFERRED TO:  Ray [facility] [address]  [address]  [phone]    Schedule an appointment as soon as possible for a visit   For a Re-check in  2-3    days.     Corrine Gale MD  [address] [place] [address] Drive  [phone]    Schedule an appointment as soon as possible for a visit   As needed, If symptoms worsen      DISCHARGE MEDICATIONS:  New Prescriptions    No medications on file       DISCONTINUED MEDICATIONS:  Discontinued Medications    No medications on file              (Please note that portions of this note were completed with a voice recognition program.  Efforts were made to edit the dictations but occasionally words are mis-transcribed.)    GARRISON Pratt (electronically signed)          GARRISON Shore  12/27/21 2047

## 2021-12-28 ENCOUNTER — TELEPHONE (OUTPATIENT)
Dept: INTERNAL MEDICINE CLINIC | Age: 51
End: 2021-12-28

## 2021-12-28 LAB
EKG ATRIAL RATE: 67 BPM
EKG DIAGNOSIS: NORMAL
EKG P AXIS: 59 DEGREES
EKG P-R INTERVAL: 168 MS
EKG Q-T INTERVAL: 382 MS
EKG QRS DURATION: 82 MS
EKG QTC CALCULATION (BAZETT): 403 MS
EKG R AXIS: 80 DEGREES
EKG T AXIS: 47 DEGREES
EKG VENTRICULAR RATE: 67 BPM
TSH REFLEX: 1.42 UIU/ML (ref 0.27–4.2)

## 2021-12-28 PROCEDURE — 93010 ELECTROCARDIOGRAM REPORT: CPT | Performed by: INTERNAL MEDICINE

## 2021-12-28 NOTE — TELEPHONE ENCOUNTER
QUESTIONS   Reason for referral request? Patient would like to have orders place to   get fasting lab work. Patient was last seen 07/15/2021. Does not have a future appt scheduled.  Please advise,

## 2021-12-28 NOTE — TELEPHONE ENCOUNTER
----- Message from Newton Jeworlin sent at 12/28/2021  8:30 AM EST -----  Subject: Appointment Request    Reason for Call: Urgent (Patient Request) ED Follow Up Visit    QUESTIONS  Type of Appointment? Established Patient  Reason for appointment request? No appointments available during search  Additional Information for Provider? Patient is calling to schedule an ED   follow-up, she was seen at [facility] for heart palpitations on   12/27/2021. Please call patient to schedule.  ---------------------------------------------------------------------------  --------------  CALL BACK INFO  What is the best way for the office to contact you? OK to leave message on   voicemail  Preferred Call Back Phone Number? [phone]  ---------------------------------------------------------------------------  --------------  SCRIPT ANSWERS  Relationship to Patient? Self  Is this follow up request related to routine Diabetes Management? No  Have you been diagnosed with, awaiting test results for, or told that you   are suspected of having COVID-19 (Coronavirus)? (If patient has tested   negative or was tested as a requirement for work, school, or travel and   not based on symptoms, answer no)? No  Within the past two weeks have you developed any of the following symptoms   (answer no if symptoms have been present longer than 2 weeks or began   more than 2 weeks ago)? Fever or Chills, Cough, Shortness of breath or   difficulty breathing, Loss of taste or smell, Sore throat, Nasal   congestion, Sneezing or runny nose, Fatigue or generalized body aches   (answer no if pain is specific to a body part e.g. back pain), Diarrhea,   Headache? No  Have you had close contact with someone with COVID-19 in the last 14 days? No  (Service Expert  click yes below to proceed with [employer] As Usual   Scheduling)? Yes  (Patient requests to see provider urgently. )?  Yes  Do you have any questions for your primary care provider that need

## 2021-12-28 NOTE — TELEPHONE ENCOUNTER
----- Message from Radha Lee sent at 12/28/2021  8:30 AM EST -----  Subject: Referral Request    QUESTIONS   Reason for referral request? Patient would like to have orders place to   get fasting lab work. Has the physician seen you for this condition before? No   Preferred Specialist (if applicable)? Do you already have an appointment scheduled? No  Additional Information for Provider?   ---------------------------------------------------------------------------  --------------  CALL BACK INFO  What is the best way for the office to contact you? OK to leave message on   voicemail  Preferred Call Back Phone Number?  [phone]

## 2021-12-29 ENCOUNTER — OFFICE VISIT (OUTPATIENT)
Dept: INTERNAL MEDICINE CLINIC | Age: 51
End: 2021-12-29
Payer: COMMERCIAL

## 2021-12-29 VITALS
OXYGEN SATURATION: 99 % | HEART RATE: 88 BPM | WEIGHT: 181 LBS | SYSTOLIC BLOOD PRESSURE: 110 MMHG | DIASTOLIC BLOOD PRESSURE: 70 MMHG | HEIGHT: 61 IN | TEMPERATURE: 97.5 F | BODY MASS INDEX: 34.17 KG/M2

## 2021-12-29 DIAGNOSIS — M19.112 OSTEOARTHRITIS OF LEFT SHOULDER DUE TO ROTATOR CUFF INJURY: ICD-10-CM

## 2021-12-29 DIAGNOSIS — S46.002S OSTEOARTHRITIS OF LEFT SHOULDER DUE TO ROTATOR CUFF INJURY: ICD-10-CM

## 2021-12-29 DIAGNOSIS — F41.9 ANXIETY: Primary | ICD-10-CM

## 2021-12-29 DIAGNOSIS — E78.5 DYSLIPIDEMIA (HIGH LDL; LOW HDL): ICD-10-CM

## 2021-12-29 DIAGNOSIS — R73.03 PRE-DIABETES: ICD-10-CM

## 2021-12-29 LAB
CHOLESTEROL, FASTING: 282 MG/DL (ref 0–199)
HDLC SERPL-MCNC: 48 MG/DL (ref 40–60)
LDL CHOLESTEROL CALCULATED: 214 MG/DL
TRIGLYCERIDE, FASTING: 102 MG/DL (ref 0–150)
VLDLC SERPL CALC-MCNC: 20 MG/DL

## 2021-12-29 PROCEDURE — 99214 OFFICE O/P EST MOD 30 MIN: CPT | Performed by: NURSE PRACTITIONER

## 2021-12-29 RX ORDER — BUSPIRONE HYDROCHLORIDE 5 MG/1
5 TABLET ORAL 2 TIMES DAILY PRN
Qty: 10 TABLET | Refills: 0 | Status: SHIPPED | OUTPATIENT
Start: 2021-12-29 | End: 2022-01-28

## 2021-12-29 RX ORDER — IBUPROFEN 800 MG/1
800 TABLET ORAL
Qty: 90 TABLET | Refills: 5 | Status: SHIPPED | OUTPATIENT
Start: 2021-12-29 | End: 2022-02-28

## 2021-12-29 RX ORDER — PRAVASTATIN SODIUM 20 MG
20 TABLET ORAL DAILY
Qty: 90 TABLET | Refills: 1 | Status: SHIPPED | OUTPATIENT
Start: 2021-12-29 | End: 2022-02-16

## 2021-12-29 ASSESSMENT — PATIENT HEALTH QUESTIONNAIRE - PHQ9
2. FEELING DOWN, DEPRESSED OR HOPELESS: 0
1. LITTLE INTEREST OR PLEASURE IN DOING THINGS: 0
SUM OF ALL RESPONSES TO PHQ9 QUESTIONS 1 & 2: 0
SUM OF ALL RESPONSES TO PHQ QUESTIONS 1-9: 0

## 2021-12-29 ASSESSMENT — ENCOUNTER SYMPTOMS
EYES NEGATIVE: 1
GASTROINTESTINAL NEGATIVE: 1
RESPIRATORY NEGATIVE: 1
ALLERGIC/IMMUNOLOGIC NEGATIVE: 1

## 2021-12-29 NOTE — PROGRESS NOTES
lb (82.6 kg)       Objective   Physical Exam  Constitutional:       Appearance: Normal appearance. She is obese. Cardiovascular:      Rate and Rhythm: Normal rate. Rhythm irregularly irregular. Heart sounds: No friction rub. No gallop. Pulmonary:      Effort: Pulmonary effort is normal.      Breath sounds: Normal breath sounds and air entry. Musculoskeletal:      Right lower leg: No edema. Left lower leg: No edema. Skin:     General: Skin is warm and dry. Neurological:      Mental Status: She is alert. Psychiatric:         Mood and Affect: Mood is anxious. Speech: Speech normal.         Behavior: Behavior normal.         Thought Content: Thought content normal.      Comments: -Explanation gvien for low d-dimer elevation  -Cholesterol profile reviewed, diet and exercise emphasized   -Employment, stress of job    All above factors ID for increase in anxiety                  An electronic signature was used to authenticate this note.     --BESSIE Madsen - CNP

## 2021-12-29 NOTE — PATIENT INSTRUCTIONS
Start exercising at least twice a week for 20 minutes  Take slow deep breaths when becoming anxious  Increase water intake

## 2021-12-30 ENCOUNTER — TELEPHONE (OUTPATIENT)
Dept: CARDIOLOGY CLINIC | Age: 51
End: 2021-12-30

## 2021-12-30 LAB
ESTIMATED AVERAGE GLUCOSE: 125.5 MG/DL
HBA1C MFR BLD: 6 %

## 2021-12-30 NOTE — TELEPHONE ENCOUNTER
Patient called to schedule stress test,  however, there are no orders to schedule this. She is a patient of Dr. Jammie Brock and states she  was seen in the ER at [facility] on 12/27/21. Please call to advise. Thank you.

## 2022-02-16 DIAGNOSIS — E78.5 DYSLIPIDEMIA (HIGH LDL; LOW HDL): ICD-10-CM

## 2022-02-16 RX ORDER — PRAVASTATIN SODIUM 20 MG
TABLET ORAL
Qty: 30 TABLET | Refills: 0 | Status: SHIPPED | OUTPATIENT
Start: 2022-02-16 | End: 2022-03-14 | Stop reason: ALTCHOICE

## 2022-02-16 NOTE — TELEPHONE ENCOUNTER
Recent Visits  Date Type Provider Dept   12/29/21 Office Visit BESSIE Molina CNP [facility]   07/15/21 [facility]   Showing recent visits within past 540 days with a meds authorizing provider and meeting all other requirements  Future Appointments  No visits were found meeting these conditions.   Showing future appointments within next 150 days with a meds authorizing provider and meeting all other requirements     12/29/2021

## 2022-02-21 ENCOUNTER — HOSPITAL ENCOUNTER (OUTPATIENT)
Dept: GENERAL RADIOLOGY | Age: 52
Discharge: HOME | End: 2022-02-21
Payer: COMMERCIAL

## 2022-02-21 ENCOUNTER — OFFICE VISIT (OUTPATIENT)
Dept: PRIMARY CARE CLINIC | Age: 52
End: 2022-02-21
Payer: COMMERCIAL

## 2022-02-21 VITALS
SYSTOLIC BLOOD PRESSURE: 118 MMHG | TEMPERATURE: 96.1 F | OXYGEN SATURATION: 99 % | HEART RATE: 90 BPM | BODY MASS INDEX: 34.28 KG/M2 | WEIGHT: 181.4 LBS | DIASTOLIC BLOOD PRESSURE: 82 MMHG

## 2022-02-21 DIAGNOSIS — M25.562 PAIN IN BOTH KNEES, UNSPECIFIED CHRONICITY: ICD-10-CM

## 2022-02-21 DIAGNOSIS — R10.31 RIGHT LOWER QUADRANT ABDOMINAL PAIN: ICD-10-CM

## 2022-02-21 DIAGNOSIS — N95.1 PERIMENOPAUSE: ICD-10-CM

## 2022-02-21 DIAGNOSIS — G47.9 DIFFICULTY SLEEPING: ICD-10-CM

## 2022-02-21 DIAGNOSIS — M25.561 PAIN IN BOTH KNEES, UNSPECIFIED CHRONICITY: ICD-10-CM

## 2022-02-21 DIAGNOSIS — M54.50 LOW BACK PAIN, UNSPECIFIED BACK PAIN LATERALITY, UNSPECIFIED CHRONICITY, UNSPECIFIED WHETHER SCIATICA PRESENT: Primary | ICD-10-CM

## 2022-02-21 LAB
A/G RATIO: 1.6 (ref 1.1–2.2)
ALBUMIN SERPL-MCNC: 4.4 G/DL (ref 3.4–5)
ALP BLD-CCNC: 92 U/L (ref 40–129)
ALT SERPL-CCNC: 16 U/L (ref 10–40)
ANION GAP SERPL CALCULATED.3IONS-SCNC: 12 MMOL/L (ref 3–16)
AST SERPL-CCNC: 21 U/L (ref 15–37)
BASOPHILS ABSOLUTE: 0 K/UL (ref 0–0.2)
BASOPHILS RELATIVE PERCENT: 0.8 %
BILIRUB SERPL-MCNC: <0.2 MG/DL (ref 0–1)
BILIRUBIN, POC: NORMAL
BLOOD URINE, POC: NORMAL
BUN BLDV-MCNC: 12 MG/DL (ref 7–20)
CALCIUM SERPL-MCNC: 10.1 MG/DL (ref 8.3–10.6)
CHLORIDE BLD-SCNC: 104 MMOL/L (ref 99–110)
CLARITY, POC: CLEAR
CO2: 29 MMOL/L (ref 21–32)
COLOR, POC: YELLOW
CREAT SERPL-MCNC: 1.2 MG/DL (ref 0.6–1.1)
EOSINOPHILS ABSOLUTE: 0.1 K/UL (ref 0–0.6)
EOSINOPHILS RELATIVE PERCENT: 1.9 %
GFR AFRICAN AMERICAN: 57
GFR NON-AFRICAN AMERICAN: 47
GLUCOSE BLD-MCNC: 98 MG/DL (ref 70–99)
GLUCOSE URINE, POC: NORMAL
HCT VFR BLD CALC: 38.4 % (ref 36–48)
HEMOGLOBIN: 12.9 G/DL (ref 12–16)
KETONES, POC: NORMAL
LEUKOCYTE EST, POC: NORMAL
LYMPHOCYTES ABSOLUTE: 1.5 K/UL (ref 1–5.1)
LYMPHOCYTES RELATIVE PERCENT: 29.7 %
MCH RBC QN AUTO: 29.3 PG (ref 26–34)
MCHC RBC AUTO-ENTMCNC: 33.7 G/DL (ref 31–36)
MCV RBC AUTO: 87 FL (ref 80–100)
MONOCYTES ABSOLUTE: 0.3 K/UL (ref 0–1.3)
MONOCYTES RELATIVE PERCENT: 5.7 %
NEUTROPHILS ABSOLUTE: 3.1 K/UL (ref 1.7–7.7)
NEUTROPHILS RELATIVE PERCENT: 61.9 %
NITRITE, POC: NORMAL
PDW BLD-RTO: 13.9 % (ref 12.4–15.4)
PH, POC: 6.5
PLATELET # BLD: 287 K/UL (ref 135–450)
PMV BLD AUTO: 8.5 FL (ref 5–10.5)
POTASSIUM SERPL-SCNC: 3.8 MMOL/L (ref 3.5–5.1)
PROTEIN, POC: NORMAL
RBC # BLD: 4.41 M/UL (ref 4–5.2)
SODIUM BLD-SCNC: 145 MMOL/L (ref 136–145)
SPECIFIC GRAVITY, POC: >=1.03
TOTAL PROTEIN: 7.1 G/DL (ref 6.4–8.2)
UROBILINOGEN, POC: 0.2
WBC # BLD: 5 K/UL (ref 4–11)

## 2022-02-21 PROCEDURE — 73562 X-RAY EXAM OF KNEE 3: CPT

## 2022-02-21 PROCEDURE — 99214 OFFICE O/P EST MOD 30 MIN: CPT | Performed by: INTERNAL MEDICINE

## 2022-02-21 PROCEDURE — 81002 URINALYSIS NONAUTO W/O SCOPE: CPT | Performed by: INTERNAL MEDICINE

## 2022-02-21 RX ORDER — IBUPROFEN 600 MG/1
600 TABLET ORAL 3 TIMES DAILY PRN
COMMUNITY
Start: 2022-02-21 | End: 2022-02-28

## 2022-02-21 NOTE — PATIENT INSTRUCTIONS
1. Low back pain, unspecified back pain laterality, unspecified chronicity, unspecified whether sciatica present  - POCT Urinalysis no Micro  - ibuprofen (ADVIL;MOTRIN) 600 MG tablet; Take 1 tablet by mouth 3 times daily as needed for Pain    2. Right lower quadrant abdominal pain  - POCT Urinalysis no Micro  - Comprehensive Metabolic Panel; Future  - CBC with Auto Differential; Future  - ibuprofen (ADVIL;MOTRIN) 600 MG tablet; Take 1 tablet by mouth 3 times daily as needed for Pain  - CT ABDOMEN PELVIS WO CONTRAST Additional Contrast? Oral; Future    3. Pain in both knees, unspecified chronicity  - XR KNEE RIGHT (3 VIEWS); Future  - XR KNEE LEFT (3 VIEWS); Future  -Referral to [address] [place] [address] MD Dioni, Orthopedic Surgery, [place]  - ibuprofen (ADVIL;MOTRIN) 600 MG tablet; Take 1 tablet by mouth 3 times daily as needed for Pain    4. Perimenopause  -try over the counter Amberen for perimenopause    5.  Difficulty sleeping  -try over the counter Amberen for perimenopause

## 2022-02-21 NOTE — PROGRESS NOTES
SALLY Escobar   YOB: 1970    Date of Visit:  2/21/2022    Chief Complaint   Patient presents with   [facility]    Knee Pain    Flank Pain     Starts in her back    Sweats     Night sweats       HPI  Patient presents to [facility]. Patient complains of knee pain for as long as she can remember. Patient states her right knee is worse. Knee pain is worse when it is cold. Knee pain is sharp and constant. Patient states if she does stairs she gets an immediate jolt of pain. Patient denies swelling and injury. Patient states she has a lot of cracking with straightening her legs. Patient takes Ibuprofen sparingly. Patient complains of right side pain that is right low back and right low abdomen for 1 week. Low back pain and right lower abdomen pain is dull achy and constant. Patient states she notices pain with waking up. Patient states feeling like she has to have a BM makes pain worse. Patient denies hematuria. Patient is no longer menstruating due to an ablation. Patient complains of hot flashes, night sweats, difficulty sleeping and mood swings with perimenopause. Patient has difficulty sleeping. Patient states she has tried an otc sleep aid. Patient states it helped but caused heart palpitations the next morning that would last for awhile. Review of Systems   Constitutional: Negative for appetite change, chills, fatigue, fever and unexpected weight change. HENT: Negative for congestion, postnasal drip, rhinorrhea, sinus pressure, sore throat and trouble swallowing. Eyes: Negative for visual disturbance. Respiratory: Negative for cough, chest tightness, shortness of breath and wheezing. Cardiovascular: Negative for chest pain, palpitations and leg swelling. Gastrointestinal: Positive for abdominal pain. Negative for abdominal distention, blood in stool, constipation, diarrhea, nausea and vomiting.    Endocrine: Negative for cold intolerance and heat intolerance. Genitourinary: Negative for dysuria, frequency and hematuria. Musculoskeletal: Positive for arthralgias and back pain. Negative for joint swelling and myalgias. Skin: Negative for rash. Neurological: Negative for dizziness, tremors, syncope, weakness, light-headedness, numbness and headaches. Psychiatric/Behavioral: Positive for sleep disturbance. Negative for dysphoric mood. The patient is not nervous/anxious. Past Medical History:   Diagnosis Date    Depression     High cholesterol     unmedicated due to side-effects    History of palpitations     per pt: refused medication at time of diagnosis    Low back pain        Past Surgical History:   Procedure Laterality Date    UTERINE FIBROID EMBOLIZATION  2012       Outpatient Medications Marked as Taking for the 2/21/22 encounter (Office Visit) with Toyin Stapleton MD   Medication Sig Dispense Refill    pravastatin (PRAVACHOL) 20 MG tablet TAKE 1 TABLET BY MOUTH AT BEDTIME 30 tablet 0    [DISCONTINUED] ibuprofen (ADVIL;MOTRIN) 800 MG tablet Take 1 tablet by mouth 3 times daily (with meals) 90 tablet 5    clobetasol (TEMOVATE) 0.05 % ointment Apply topically 2 times daily. (Patient taking differently: as needed Apply topically 2 times daily.) 30 g 1         Allergies   Allergen Reactions    Latex Hives    Fluconazole In Dextrose      Vaginal burning    Iodine     Tramadol Itching       Social History     Tobacco Use    Smoking status: Never Smoker    Smokeless tobacco: Never Used   Substance Use Topics    Alcohol use:  Yes     Alcohol/week: 0.0 standard drinks     Comment: Occasionally       Family History   Problem Relation Age of Onset    Heart Disease Mother 61    Arthritis Mother     Diabetes Father 61    Heart Disease Father     Kidney Disease Father     Hypertension Brother     Diabetes Brother        Immunization History   Administered Date(s) Administered    COVIDCoreXchange top, DILUTE for use, 12+ yrs, 30mcg/0.3mL dose 01/28/2021, 02/20/2021, 12/08/2021       Vitals:    02/21/22 1454   BP: 118/82   Pulse: 90   Temp: 96.1 °F (35.6 °C)   SpO2: 99%   Weight: 181 lb 6.4 oz (82.3 kg)     Body mass index is 34.28 kg/m². Physical Exam  Nursing note reviewed. Constitutional:       General: She is not in acute distress. Appearance: Normal appearance. She is well-developed. Eyes:      General: Lids are normal.      Extraocular Movements: Extraocular movements intact. Conjunctiva/sclera: Conjunctivae normal.      Pupils: Pupils are equal, round, and reactive to light. Neck:      Thyroid: No thyromegaly. Vascular: No carotid bruit. Cardiovascular:      Rate and Rhythm: Normal rate and regular rhythm. Heart sounds: Normal heart sounds, S1 normal and S2 normal. No murmur heard. No friction rub. No gallop. Pulmonary:      Effort: Pulmonary effort is normal. No respiratory distress. Breath sounds: Normal breath sounds. No wheezing, rhonchi or rales. Abdominal:      General: Bowel sounds are normal. There is no distension. Palpations: Abdomen is soft. Tenderness: There is abdominal tenderness in the right lower quadrant. Musculoskeletal:      Cervical back: Neck supple. Lumbar back: Tenderness (right low back) present. No spasms. Normal range of motion. Right knee: Crepitus present. No swelling. Normal range of motion. Tenderness present. Left knee: Crepitus present. No swelling. Normal range of motion. No tenderness. Right lower leg: No edema. Left lower leg: No edema. Lymphadenopathy:      Head:      Right side of head: No submandibular adenopathy. Left side of head: No submandibular adenopathy. Neurological:      Mental Status: She is alert and oriented to person, place, and time.       Gait: Gait normal.   Psychiatric:         Mood and Affect: Mood normal.             Results for POC orders placed in visit on 02/21/22   POCT Urinalysis no Micro   Result Value Ref Range    Color, UA yellow     Clarity, UA clear     Glucose, UA POC neg     Bilirubin, UA neg     Ketones, UA neg     Spec Grav, UA >=1.030     Blood, UA POC trace     pH, UA 6.5     Protein, UA POC neg     Urobilinogen, UA 0.2     Leukocytes, UA neg     Nitrite, UA neg          Assessment/Plan     1. Low back pain, unspecified back pain laterality, unspecified chronicity, unspecified whether sciatica present  - POCT Urinalysis no Micro  - ibuprofen (ADVIL;MOTRIN) 600 MG tablet; Take 1 tablet by mouth 3 times daily as needed for Pain    2. Right lower quadrant abdominal pain  - POCT Urinalysis no Micro  - Comprehensive Metabolic Panel; Future  - CBC with Auto Differential; Future  - ibuprofen (ADVIL;MOTRIN) 600 MG tablet; Take 1 tablet by mouth 3 times daily as needed for Pain  - CT ABDOMEN PELVIS WO CONTRAST Additional Contrast? Oral; Future    3. Pain in both knees, unspecified chronicity  - XR KNEE RIGHT (3 VIEWS); Future  - XR KNEE LEFT (3 VIEWS); Future  -Referral to [address] [place] [address] MD Dioni, Orthopedic Surgery, [place]  - ibuprofen (ADVIL;MOTRIN) 600 MG tablet; Take 1 tablet by mouth 3 times daily as needed for Pain    4. Perimenopause  -try over the counter Amberen for perimenopause    5. Difficulty sleeping  -try over the counter Amberen for perimenopause            Discussed medications with patient, who voiced understanding of their use and indications. All questions answered. Return in about 1 week (around 2/28/2022) for abdominal pain, back pain, and results.

## 2022-02-27 ENCOUNTER — HOSPITAL ENCOUNTER (OUTPATIENT)
Dept: CT IMAGING | Age: 52
Discharge: HOME | End: 2022-02-27
Payer: COMMERCIAL

## 2022-02-27 DIAGNOSIS — R10.31 RIGHT LOWER QUADRANT ABDOMINAL PAIN: ICD-10-CM

## 2022-02-27 PROCEDURE — 74176 CT ABD & PELVIS W/O CONTRAST: CPT

## 2022-02-27 PROCEDURE — 6360000004 HC RX CONTRAST MEDICATION: Performed by: INTERNAL MEDICINE

## 2022-02-27 RX ADMIN — IOHEXOL 50 ML: 240 INJECTION, SOLUTION INTRATHECAL; INTRAVASCULAR; INTRAVENOUS; ORAL at 14:00

## 2022-02-28 ENCOUNTER — TELEMEDICINE (OUTPATIENT)
Dept: PRIMARY CARE CLINIC | Age: 52
End: 2022-02-28
Payer: COMMERCIAL

## 2022-02-28 ENCOUNTER — TELEPHONE (OUTPATIENT)
Dept: PRIMARY CARE CLINIC | Age: 52
End: 2022-02-28

## 2022-02-28 DIAGNOSIS — R35.0 URINARY FREQUENCY: ICD-10-CM

## 2022-02-28 DIAGNOSIS — R94.4 KIDNEY FUNCTION TEST ABNORMAL: ICD-10-CM

## 2022-02-28 DIAGNOSIS — M54.50 LOW BACK PAIN, UNSPECIFIED BACK PAIN LATERALITY, UNSPECIFIED CHRONICITY, UNSPECIFIED WHETHER SCIATICA PRESENT: ICD-10-CM

## 2022-02-28 DIAGNOSIS — R10.9 ABDOMINAL CRAMPING: ICD-10-CM

## 2022-02-28 DIAGNOSIS — R10.31 RIGHT LOWER QUADRANT ABDOMINAL PAIN: Primary | ICD-10-CM

## 2022-02-28 PROCEDURE — 99214 OFFICE O/P EST MOD 30 MIN: CPT | Performed by: INTERNAL MEDICINE

## 2022-02-28 RX ORDER — DICYCLOMINE HYDROCHLORIDE 10 MG/1
10 CAPSULE ORAL 3 TIMES DAILY PRN
Qty: 30 CAPSULE | Refills: 0 | Status: SHIPPED | OUTPATIENT
Start: 2022-02-28 | End: 2022-03-15 | Stop reason: ALTCHOICE

## 2022-02-28 ASSESSMENT — ENCOUNTER SYMPTOMS
BLOOD IN STOOL: 0
RHINORRHEA: 0
SINUS PRESSURE: 0
TROUBLE SWALLOWING: 0
BACK PAIN: 1
ABDOMINAL DISTENTION: 0
SHORTNESS OF BREATH: 0
COUGH: 0
VOMITING: 0
WHEEZING: 0
NAUSEA: 0
ABDOMINAL PAIN: 1
CHEST TIGHTNESS: 0
SORE THROAT: 0
DIARRHEA: 0
CONSTIPATION: 0

## 2022-02-28 NOTE — TELEPHONE ENCOUNTER
Within this Telehealth Consent, the terms you and yours refer to the person using the Telehealth Service (Service), or in the case of a use of the Service by or on behalf of a minor, you and yours refer to and include (i) the parent or legal guardian who provides consent to the use of the Service by such minor or uses the Service on behalf of such minor, and (ii) the minor for whom consent is being provided or on whose behalf the Service is being utilized. When using Service, you will be consulting with your health care providers via the use of Telehealth.   Telehealth involves the delivery of healthcare services using electronic communications, information technology or other means between a healthcare provider and a patient who are not in the same physical location. Telehealth may be used for diagnosis, treatment, follow-up and/or patient education, and may include, but is not limited to, one or more of the following:    Electronic transmission of medical records, photo images, personal health information or other data between a patient and a healthcare provider    Interactions between a patient and healthcare provider via audio, video and/or data communications    Use of output data from medical devices, sound and video files    Anticipated Benefits   The use of Telehealth by your Provider(s) through the Service may have the following possible benefits:    Making it easier and more efficient for you to access medical care and treatment for the conditions treated by such Provider(s) utilizing the Service    Allowing you to obtain medical care and treatment by Provider(s) at times that are convenient for you    Enabling you to interact with Provider(s) without the necessity of an in-office appointment     Possible Risks   While the use of Telehealth can provide potential benefits for you, there are also potential risks associated with the use of Telehealth.  These risks include, but may not be limited to the following:    Your Provider(s) may not able to provide medical treatment for your particular condition and you may be required to seek alternative healthcare or emergency care services.  The electronic systems or other security protocols or safeguards used in the Service could fail, causing a breach of privacy of your medical or other information.  Given regulatory requirements in certain jurisdictions, your Provider(s) diagnosis and/or treatment options, especially pertaining to certain prescriptions, may be limited. Acceptance   1. You understand that Services will be provided via Telehealth. This process involves the use of HIPAA compliant and secure, real-time audio-visual interfacing with a qualified and appropriately trained provider located at [facility]. 2. You understand that, under no circumstances, will this session be recorded. 3. You understand that the Provider(s) at [facility] and other clinical participants will be party to the information obtained during the Telehealth session in accordance with best medical practices. 4. You understand that the information obtained during the Telehealth session will be used to help determine the most appropriate treatment options. 5. You understand that You have the right to revoke this consent at any point in time. 6. You understand that Telehealth is voluntary, and that continued treatment is not dependent upon consent. 7. You understand that, in the event of non-consent to Telehealth services and/or technical difficulties, you will obtain services as typically provided in the absence of Telehealth technology. 8. You understand that this consent will be kept in Your medical record. 9. No potential benefits from the use of Telehealth or specific results can be guaranteed. Your condition may not be cured or improved and, in some cases, may get worse.    10. There are limitations in the provision of medical care and treatment via Telehealth and the Service and you may not be able to receive diagnosis and/or treatment through the Service for every condition for which you seek diagnosis and/or treatment. 11. There are potential risks to the use of Telehealth, including but not limited to the risks described in this Telehealth Consent. 12. Your Provider(s) have discussed the use of Telehealth and the Service with you, including the benefits and risks of such and you have provided oral consent to your Provider(s) for the use of Telehealth and the Service. 15. You understand that it is your duty to provide your Provider(s) truthful, accurate and complete information, including all relevant information regarding care that you may have received or may be receiving from other healthcare providers outside of the Service. 14. You understand that each of your Provider(s) may determine in his or sole discretion that your condition is not suitable for diagnosis and/or treatment using the Service, and that you may need to seek medical care and treatment a specialist or other healthcare provider, outside of the Service. 15. You understand that you are fully responsible for payment for all services provided by Provider(s) or through use of the Service and that you may not be able to use third-party insurance. 16. You represent that (a) you have read this Telehealth Consent carefully, (b) you understand the risks and benefits of the Service and the use of Telehealth in the medical care and treatment provided to you by Provider(s) using the Service, and (c) you have the legal capacity and authority to provide this consent for yourself and/or the minor for which you are consenting under applicable federal and state laws, including laws relating to the age of [de-identified] and/or parental/guardian consent.    17. You give your informed consent to the use of Telehealth by Provider(s) using the Service under the terms described in the Terms of Service and this Telehealth Consent. The patient was read the following statement and has consented to the visit as of 2/28/22. The patient has been scheduled for their first telehealth visit on 2/28/22 with Kevinraegan Beltran.

## 2022-02-28 NOTE — PROGRESS NOTES
2022    TELEHEALTH EVALUATION -- Audio/Visual (During [facility] public [facility] emergency)    Chief Complaint   Patient presents with    Results     CT scan       HPI:    SALLY Romeo Moriah (:  1970) has requested an audio/video evaluation for the following concern(s):    Patient has right side abdominal pain. Abdominal pain is dull and constant. Patient states she has had abdominal cramping that is coming and going. Patient denies nausea, vomiting, diarrhea, and constipation. Patient states she has urinary frequency and her bladder feels full since last week. Patient denies dysuria and hematuria. Patient states she doesn't have menstrual periods since she has had an ablation. Patient states she felt like she wanted to start her period with the cramping. Patient had a CT pelvis abdomen and labs done. Patient has low back pain that is dull achy and constant. Patient denies numbness and tingling. Patient denies injury. Patient takes Ibuprofen sparingly. Review of Systems   Constitutional: Negative for appetite change, chills, fatigue and fever. HENT: Negative for congestion, postnasal drip, rhinorrhea, sinus pressure and sore throat. Eyes: Negative for visual disturbance. Respiratory: Negative for cough, chest tightness, shortness of breath and wheezing. Cardiovascular: Negative for chest pain, palpitations and leg swelling. Gastrointestinal: Positive for abdominal pain. Negative for abdominal distention, blood in stool, constipation, diarrhea, nausea and vomiting. Genitourinary: Positive for frequency. Negative for decreased urine volume, difficulty urinating, dysuria, flank pain, hematuria and urgency. Musculoskeletal: Positive for arthralgias and back pain. Negative for myalgias. Skin: Negative for rash. Neurological: Negative for dizziness, tremors, syncope, weakness, light-headedness, numbness and headaches.    Psychiatric/Behavioral: Negative for dysphoric mood and sleep disturbance. The patient is not nervous/anxious. Prior to Visit Medications    Medication Sig Taking? Authorizing Provider   pravastatin (PRAVACHOL) 20 MG tablet TAKE 1 TABLET BY MOUTH AT BEDTIME Yes BESSIE Calix CNP   clobetasol (TEMOVATE) 0.05 % ointment Apply topically 2 times daily. Patient taking differently: as needed Apply topically 2 times daily. Yes BESSIE Malhotra CNP       Social History     Tobacco Use    Smoking status: Never Smoker    Smokeless tobacco: Never Used   Substance Use Topics    Alcohol use: Yes     Alcohol/week: 0.0 standard drinks     Comment: Occasionally    Drug use: No        Allergies   Allergen Reactions    Latex Hives    Fluconazole In Dextrose      Vaginal burning    Iodine     Tramadol Itching   ,   Past Medical History:   Diagnosis Date    Depression     High cholesterol     unmedicated due to side-effects    History of palpitations     per pt: refused medication at time of diagnosis    Low back pain        PHYSICAL EXAMINATION:  [ INSTRUCTIONS:  \"[x]\" Indicates a positive item  \"[]\" Indicates a negative item  -- DELETE ALL ITEMS NOT EXAMINED]  Vital Signs: (As obtained by patient/caregiver or practitioner observation)    Blood pressure-  Heart rate-    Respiratory rate-    Temperature-  Pulse oximetry-   Patient-Reported Vitals 10/6/2021   Patient-Reported Weight 182lb   Patient-Reported Height 5'1   Patient-Reported Systolic -   Patient-Reported Diastolic -   Patient-Reported Temperature -      Constitutional: [x] Appears well-developed and well-nourished [x] No apparent distress      [] Abnormal-   Mental status  [x] Alert and awake  [x] Oriented to person/place/time [x]Able to follow commands      Eyes:  EOM    [x]  Normal  [] Abnormal-  Sclera  [x]  Normal  [] Abnormal -         Discharge [x]  None visible  [] Abnormal -    HENT:   [x] Normocephalic, atraumatic.   [] Abnormal   [] Mouth/Throat: Mucous membranes are moist.     External Ears [x] Normal  [] Abnormal-     Neck: [x] No visualized mass     Pulmonary/Chest: [x] Respiratory effort normal.  [x] No visualized signs of difficulty breathing or respiratory distress        [] Abnormal-      Musculoskeletal:   [] Normal gait with no signs of ataxia         [x] Normal range of motion of neck        [] Abnormal-       Neurological:        [x] No Facial Asymmetry (Cranial nerve 7 motor function) (limited exam to video visit)          [x] No gaze palsy        [] Abnormal-         Skin:        [x] No significant exanthematous lesions or discoloration noted on facial skin         [] Abnormal-            Psychiatric:       [x] Normal Affect [] No Hallucinations        [] Abnormal-     Other pertinent observable physical exam findings-     Lab Review   Orders Only on 02/21/2022   Component Date Value    Sodium 02/21/2022 145     Potassium 02/21/2022 3.8     Chloride 02/21/2022 104     CO2 02/21/2022 29     Anion Gap 02/21/2022 12     Glucose 02/21/2022 98     BUN 02/21/2022 12     CREATININE 02/21/2022 1.2*    GFR Non- 02/21/2022 47*    GFR  02/21/2022 57*    Calcium 02/21/2022 10.1     Total Protein 02/21/2022 7.1     Albumin 02/21/2022 4.4     Albumin/Globulin Ratio 02/21/2022 1.6     Total Bilirubin 02/21/2022 <0.2     Alkaline Phosphatase 02/21/2022 92     ALT 02/21/2022 16     AST 02/21/2022 21     WBC 02/21/2022 5.0     RBC 02/21/2022 4.41     Hemoglobin 02/21/2022 12.9     Hematocrit 02/21/2022 38.4     MCV 02/21/2022 87.0     MCH 02/21/2022 29.3     MCHC 02/21/2022 33.7     RDW 02/21/2022 13.9     Platelets 87/50/5693 287     MPV 02/21/2022 8.5     Neutrophils % 02/21/2022 61.9     Lymphocytes % 02/21/2022 29.7     Monocytes % 02/21/2022 5.7     Eosinophils % 02/21/2022 1.9     Basophils % 02/21/2022 0.8     Neutrophils Absolute 02/21/2022 3.1     Lymphocytes Absolute 02/21/2022 1.5     Monocytes Absolute 02/21/2022 0.3     Eosinophils Absolute 02/21/2022 0.1     Basophils Absolute 02/21/2022 0.0    Office Visit on 02/21/2022   Component Date Value    Color, UA 02/21/2022 yellow     Clarity, UA 02/21/2022 clear     Glucose, UA POC 02/21/2022 neg     Bilirubin, UA 02/21/2022 neg     Ketones, UA 02/21/2022 neg     Spec Grav, UA 02/21/2022 >=1.030     Blood, UA POC 02/21/2022 trace     pH, UA 02/21/2022 6.5     Protein, UA POC 02/21/2022 neg     Urobilinogen, UA 02/21/2022 0.2     Leukocytes, UA 02/21/2022 neg     Nitrite, UA 02/21/2022 neg      CT ABDOMEN AND PELVIS WITHOUT CONTRAST  2/27/22       HISTORY: Right lower quadrant pain       COMPARISON EXAM: 9 8/20/2019       FINDINGS: Thin section axial images obtained through the abdomen pelvis.  No IV contrast administered.  Oral contrast was given.  Multiplanar reconstruction images received for interpretation.  Low radiation dose CT technique utilized.       Heart size appears within normal limits.  Lung bases are clear.  Small hiatal hernia.       Within the abdomen, the liver, spleen, pancreas, gallbladder, and adrenal glands are unremarkable.  Bilateral kidneys appear normal in size and attenuation.  No renal mass or renal calculi seen.  No findings for obstructive uropathy.       Stomach is distended containing moderate amount contrast.  Bowel loops are partially opacified without findings for obstruction.  No discrete bowel wall thickening or mass identified.  The appendix is visualized and appears within normal limits.       Within the pelvis, bladder mildly distended, otherwise unremarkable.  Uterus and adnexal structures are intact.  No pelvic lymphadenopathy or free fluid.  Visualized bony structures are intact.  Tiny umbilical hernia containing fat.           Impression       1.  No findings for acute intra-abdominal or pelvic abnormality to explain patient's symptoms.       2.  Nonobstructive bowel gas pattern with normal CT appearance of the appendix.       3.  Small hiatal hernia. 3 VIEWS OF THE RIGHT KNEE 2/21/22       HISTORY: Pain, unspecified chronicity       FINDINGS: There is no fracture or dislocation.  Patellofemoral and tibiofemoral joints are intact.  No discrete soft tissue abnormality identified.  No joint effusion.           Impression       1.  No findings for acute bony or soft tissue abnormality. ASSESSMENT/PLAN:  1. Right lower quadrant abdominal pain  - CT abdomen pelvis is unremarkable for cause of right lower quadrant abdominal pain  - Referral to  Jhony Fraga MD, Gastroenterology, [facility]  - start dicyclomine (BENTYL) 10 MG capsule; Take 1 capsule by mouth 3 times daily as needed (abdominal cramping)  Dispense: 30 capsule; Refill: 0    2. Abdominal cramping  - CT abdomen pelvis is unremarkable for cause  - dicyclomine (BENTYL) 10 MG capsule; Take 1 capsule by mouth 3 times daily as needed (abdominal cramping)  Dispense: 30 capsule; Refill: 0    3. Kidney function test abnormal  - Basic Metabolic Panel; Future  - Avoid NSAID's such as otc Ibuprofen, Advil, Motrin, Naprosyn, and Aleve as well as prescription NSAID's    4. Urinary frequency  - Culture, Urine; Future    5. Low back pain, unspecified back pain laterality, unspecified chronicity, unspecified whether sciatica present  - Tylenol 650mg 3 times daily as needed  - Discontinue Ibuprofen due to abnormal kidney function tests. Return in about 4 weeks (around 3/28/2022) for annual physical exam.    SALLY Garner, was evaluated through a synchronous (real-time) audio-video encounter. The patient (or guardian if applicable) is aware that this is a billable service. Verbal consent to proceed has been obtained within the past 12 months.  The visit was conducted pursuant to the emergency declaration under the [address] [facility] waiver authority and the [employer] Appropriations Act. Patient identification was verified, and a caregiver was present when appropriate. The patient was located in a state where the provider was credentialed to provide care. Total time spent on this encounter: Not billed by time    --Gi Montez MD on 3/8/2022 at 4:33 PM    An electronic signature was used to authenticate this note.

## 2022-03-08 ENCOUNTER — TELEPHONE (OUTPATIENT)
Dept: PRIMARY CARE CLINIC | Age: 52
End: 2022-03-08

## 2022-03-08 PROBLEM — R10.31 RIGHT LOWER QUADRANT ABDOMINAL PAIN: Status: ACTIVE | Noted: 2022-03-08

## 2022-03-08 PROBLEM — R94.4 KIDNEY FUNCTION TEST ABNORMAL: Status: ACTIVE | Noted: 2022-03-08

## 2022-03-08 PROBLEM — R10.9 ABDOMINAL CRAMPING: Status: ACTIVE | Noted: 2022-03-08

## 2022-03-08 PROBLEM — R35.0 URINARY FREQUENCY: Status: ACTIVE | Noted: 2022-03-08

## 2022-03-08 PROBLEM — M54.50 LOW BACK PAIN: Status: ACTIVE | Noted: 2022-03-08

## 2022-03-08 ASSESSMENT — ENCOUNTER SYMPTOMS
CONSTIPATION: 0
SHORTNESS OF BREATH: 0
WHEEZING: 0
SINUS PRESSURE: 0
SORE THROAT: 0
CHEST TIGHTNESS: 0
COUGH: 0
DIARRHEA: 0
VOMITING: 0
NAUSEA: 0
ABDOMINAL PAIN: 1
RHINORRHEA: 0
BLOOD IN STOOL: 0
BACK PAIN: 1
ABDOMINAL DISTENTION: 0

## 2022-03-08 NOTE — TELEPHONE ENCOUNTER
Call patient. Did she have the urine culture and follow up basic metabolic panel lab done? The orders are still open in the computer.

## 2022-03-11 DIAGNOSIS — R94.4 KIDNEY FUNCTION TEST ABNORMAL: ICD-10-CM

## 2022-03-11 DIAGNOSIS — R35.0 URINARY FREQUENCY: ICD-10-CM

## 2022-03-11 LAB
ANION GAP SERPL CALCULATED.3IONS-SCNC: 13 MMOL/L (ref 3–16)
BUN BLDV-MCNC: 12 MG/DL (ref 7–20)
CALCIUM SERPL-MCNC: 9.4 MG/DL (ref 8.3–10.6)
CHLORIDE BLD-SCNC: 104 MMOL/L (ref 99–110)
CO2: 26 MMOL/L (ref 21–32)
CREAT SERPL-MCNC: 1.3 MG/DL (ref 0.6–1.1)
GFR AFRICAN AMERICAN: 52
GFR NON-AFRICAN AMERICAN: 43
GLUCOSE BLD-MCNC: 94 MG/DL (ref 70–99)
POTASSIUM SERPL-SCNC: 4.1 MMOL/L (ref 3.5–5.1)
SODIUM BLD-SCNC: 143 MMOL/L (ref 136–145)

## 2022-03-13 LAB — URINE CULTURE, ROUTINE: NORMAL

## 2022-03-14 ENCOUNTER — OFFICE VISIT (OUTPATIENT)
Dept: PRIMARY CARE CLINIC | Age: 52
End: 2022-03-14
Payer: COMMERCIAL

## 2022-03-14 VITALS
WEIGHT: 181.4 LBS | OXYGEN SATURATION: 98 % | TEMPERATURE: 96.7 F | SYSTOLIC BLOOD PRESSURE: 132 MMHG | BODY MASS INDEX: 34.28 KG/M2 | DIASTOLIC BLOOD PRESSURE: 84 MMHG | RESPIRATION RATE: 16 BRPM | HEART RATE: 83 BPM

## 2022-03-14 DIAGNOSIS — R73.03 PREDIABETES: ICD-10-CM

## 2022-03-14 DIAGNOSIS — R10.31 RIGHT LOWER QUADRANT ABDOMINAL PAIN: Primary | ICD-10-CM

## 2022-03-14 DIAGNOSIS — R10.9 ABDOMINAL CRAMPING: ICD-10-CM

## 2022-03-14 DIAGNOSIS — N18.31 STAGE 3A CHRONIC KIDNEY DISEASE (HCC): ICD-10-CM

## 2022-03-14 DIAGNOSIS — E78.2 MIXED HYPERLIPIDEMIA: ICD-10-CM

## 2022-03-14 PROCEDURE — 99214 OFFICE O/P EST MOD 30 MIN: CPT | Performed by: INTERNAL MEDICINE

## 2022-03-14 RX ORDER — ROSUVASTATIN CALCIUM 20 MG/1
20 TABLET, COATED ORAL DAILY
Qty: 30 TABLET | Refills: 3 | Status: SHIPPED | OUTPATIENT
Start: 2022-03-14 | End: 2022-06-21 | Stop reason: SDUPTHER

## 2022-03-14 NOTE — PATIENT INSTRUCTIONS
1. Right lower quadrant abdominal pain  -better but still mild  -patient reports pelvic ultrasound showed uterine fibroid side  -Follow up with GI for colonoscopy   -Tylenol (Acetaminophen) 650mg 3 times daily as needed    2. Abdominal cramping  -resolved    3. Stage 3a chronic kidney disease (HCC)  -Avoid NSAID's such as otc Ibuprofen, Advil, Motrin, Naprosyn, and Aleve as well as prescription NSAID's  -Referral to [facility]) Jessa Forbes MD, Nephrology, [facility]    4. Prediabetes  -last Hemoglobin A1c 6.0%   -Low carbohydrate diet  -Regular aerobic exercise    5. Mixed hyperlipidemia  -last lipids shows very high cholesterol and LDL   -discontinue Pravastatin   -start rosuvastatin (CRESTOR) 20 MG tablet; Take 1 tablet by mouth daily  Dispense: 30 tablet; Refill: 3  -Low fat, low cholesterol diet  -Regular aerobic exercise      Patient Education        Chronic Kidney Disease: Care Instructions  Overview     Chronic kidney disease happens when your kidneys don't work as well as they should. Your kidneys have a few important jobs. They remove waste from your blood. This waste leaves your body in your urine. They also balance your body's fluids and chemicals. When your kidneys don't work well, extra waste and fluid can build up. This can poison the body and sometimes cause death. The most common causes of this disease are diabetes and high blood pressure. In some cases, the disease develops in 2 to 3 months. But it usually develops over many years. If you take medicine and make healthy changes to your lifestyle, you may be able to prevent the disease from getting worse. But if your kidney damage gets worse, you may need dialysis or a kidney transplant. Dialysis uses a machine to filter waste from the blood. A transplant is surgery to give you a healthy kidney from another person. Follow-up care is a key part of your treatment and safety.  Be sure to make and go to all appointments, and call your doctor if you are having problems. It's also a good idea to know your test results and keep a list of the medicines you take. How can you care for yourself at home? Treatments and appointments    · Be safe with medicines. Take your medicines exactly as prescribed. Call your doctor if you have any problems with your medicine. You also may take medicine to control your blood pressure or to treat diabetes. Many people who have diabetes take blood pressure medicine.     · If you have diabetes, do your best to keep your blood sugar in your target range. You may do this by eating healthy food and exercising. You may also take medicines.     · Go to your dialysis appointments if you have this treatment.     · Do not take ibuprofen, naproxen, or similar medicines, unless your doctor tells you to. These may make the disease worse.     · Do not take any vitamins, over-the-counter medicines, or herbal products without talking to your doctor first.     · Do not smoke or use other tobacco products. Smoking can reduce blood flow to the kidneys. If you need help quitting, talk to your doctor about stop-smoking programs and medicines. These can increase your chances of quitting for good.     · Limit your use of alcohol and avoid illegal drugs.     · Talk to your doctor about an exercise plan. Exercise helps lower your blood pressure. It also makes you feel better.     · If you have an advance directive, let your doctor know. It may include a living will and a durable power of  for health care. If you don't have one, you may want to prepare one. It lets your doctor and loved ones know your health care wishes if you become unable to speak for yourself. Diet    · Talk to a registered dietitian. They can help you make a meal plan that is right for you. Most people with kidney disease need to limit salt (sodium), fluids, and protein. Some also have to limit potassium and phosphorus.     · You may have to give up many foods you like. But try to focus on the fact that this will help you stay healthy for as long as possible.     · If you have a hard time eating enough, talk to your doctor or dietitian about ways to add calories to your diet.     · Your diet may change as your disease changes. See your doctor for regular testing. And work with a dietitian to change your diet as needed. When should you call for help? Call 911 anytime you think you may need emergency care. For example, call if:    · You passed out (lost consciousness). Call your doctor now or seek immediate medical care if:    · You have less urine than normal or no urine.     · You have trouble urinating or can urinate only very small amounts.     · You are confused or have trouble thinking clearly.     · You feel weaker or more tired than usual.     · You are very thirsty, lightheaded, or dizzy.     · You have nausea and vomiting.     · You have new swelling of your arms or feet, or your swelling is worse.     · You have blood in your urine.     · You have new or worse trouble breathing. Watch closely for changes in your health, and be sure to contact your doctor if:    · You have any problems with your medicine or other treatment. Where can you learn more? Go to [email or web address]://Asana.Pivot [email or web address] org and sign in to your [employer] account. Enter N276 in the [facility] box to learn more about \"Chronic Kidney Disease: Care Instructions. \"     If you do not have an account, please click on the \"Sign Up Now\" link. Current as of: September 8, 2021               Content Version: 13.1  © 2006-2021 Healthwise, Incorporated. Care instructions adapted under license by [facility] ([facility]). If you have questions about a medical condition or this instruction, always ask your healthcare professional. [place] [address] any warranty or liability for your use of this information.          Patient Education        High Cholesterol: Care Instructions  Overview Cholesterol is a type of fat in your blood. It is needed for many body functions, such as making new cells. Cholesterol is made by your body. It also comes from food you eat. High cholesterol means that you have too much of the fat in your blood. This raises your risk of a heart attack and stroke. LDL and HDL are part of your total cholesterol. LDL is the \"bad\" cholesterol. High LDL can raise your risk for coronary artery disease, heart attack, and stroke. HDL is the \"good\" cholesterol. It helps clear bad cholesterol from the body. High HDL is linked with a lower risk of coronary artery disease, heart attack, and stroke. Your cholesterol levels help your doctor find out your risk for having a heart attack or stroke. You and your doctor can talk about whether you need to lower your risk and what treatment is best for you. Treatment options include a heart-healthy lifestyle and medicine. Both options can help lower your cholesterol and your risk. The way you choose to lower your risk will depend on how high your risk is for heart attack and stroke. It will also depend on how you feel about taking medicines. Follow-up care is a key part of your treatment and safety. Be sure to make and go to all appointments, and call your doctor if you are having problems. It's also a good idea to know your test results and keep a list of the medicines you take. How can you care for yourself at home? · Eat heart-healthy foods. ? Eat fruits, vegetables, whole grains, beans, and other high-fiber foods. ? Eat lean proteins, such as seafood, lean meats, beans, nuts, and soy products. ? Eat healthy fats, such as canola and olive oil. ? Choose foods that are low in saturated fat. ? Limit sodium and alcohol. ? Limit drinks and foods with added sugar. · Be physically active. Try to do moderate activity at least 2½ hours a week. Or try to do vigorous activity at least 1¼ hours a week.  You may want to walk or try other activities, such as running, swimming, cycling, or playing tennis or team sports. · Stay at a healthy weight or lose weight by making the changes in eating and physical activity listed above. Losing just a small amount of weight, even 5 to 10 pounds, can help reduce your risk for having a heart attack or stroke. · Do not smoke. · Manage other health problems. These include diabetes and high blood pressure. If you think you may have a problem with alcohol or drug use, talk to your doctor. · If you take medicine, take it exactly as prescribed. Call your doctor if you think you are having a problem with your medicine. · Check with your doctor or pharmacist before you use any other medicines, including over-the-counter medicines. Make sure your doctor knows all of the medicines, vitamins, herbal products, and supplements you take. Taking some medicines together can cause problems. When should you call for help? Watch closely for changes in your health, and be sure to contact your doctor if:    · You need help making lifestyle changes.     · You have questions about your medicine. Where can you learn more? Go to https://Level 3 Communications.onkea. org and sign in to your [employer] account. Enter D071 in the [facility] box to learn more about \"High Cholesterol: Care Instructions. \"     If you do not have an account, please click on the \"Sign Up Now\" link. Current as of: April 29, 2021               Content Version: 13.1  © 2006-2021 Healthwise, Incorporated. Care instructions adapted under license by [facility] [place] ([facility]). If you have questions about a medical condition or this instruction, always ask your healthcare professional. [place] [address] any warranty or liability for your use of this information. Patient Education        Learning About High Cholesterol  What is high cholesterol? High cholesterol means that you have too much cholesterol in your blood.  Cholesterol is a type of fat. It's needed for many body functions, such as making new cells. Cholesterol is made by your body. It also comes from food you eat. Having high cholesterol can lead to the buildup of plaque in artery walls. This can increase your risk of heart attack and stroke. When your doctor talks about high cholesterol levels, your doctor is talking about your total cholesterol and LDL cholesterol (the \"bad\" cholesterol) levels. Your doctor may also speak about HDL (the \"good\" cholesterol) levels. High HDL is linked with a lower risk for coronary artery disease, heart attack, and stroke. Your cholesterol levels help your doctor find out your risk for having a heart attack or stroke. How can you help prevent high cholesterol? A heart-healthy lifestyle can help you prevent high cholesterol and lower your risk for a heart attack and stroke. · Eat heart-healthy foods. ? Eat fruits, vegetables, whole grains, beans, and other high-fiber foods. ? Eat lean proteins, such as seafood, lean meats, beans, nuts, and soy products. ? Eat healthy fats, such as canola and olive oil. ? Choose foods that are low in saturated fat. ? Limit sodium and alcohol. ? Limit drinks and foods with added sugar. · Be active. Try to do moderate activity at least 2½ hours a week. Or try vigorous activity at least 1¼ hours a week. You may want to walk or try other activities, such as running, swimming, cycling, or playing tennis or team sports. · Stay at a healthy weight. Lose weight if you need to. · Don't smoke. If you need help quitting, talk to your doctor about stop-smoking programs and medicines. These can increase your chances of quitting for good. How is high cholesterol treated? The goal of treatment is to reduce your chances of having a heart attack or stroke. The goal is not to lower your cholesterol numbers only. · Have a heart-healthy lifestyle.  This includes eating healthy foods, not smoking, losing weight, and being more active. · You may choose to take medicine. Follow-up care is a key part of your treatment and safety. Be sure to make and go to all appointments, and call your doctor if you are having problems. It's also a good idea to know your test results and keep a list of the medicines you take. Where can you learn more? Go to https://[email or web address] org and sign in to your [employer] account. Enter B878 in the Instablogs box to learn more about \"Learning About High Cholesterol. \"     If you do not have an account, please click on the \"Sign Up Now\" link. Current as of: April 29, 2021               Content Version: 13.1  © 2006-2021 [employer]. Care instructions adapted under license by [facility] ([facility]). If you have questions about a medical condition or this instruction, always ask your healthcare professional. [address] any warranty or liability for your use of this information. Patient Education        Prediabetes: Care Instructions  Overview     Prediabetes is a warning sign that you're at risk for getting type 2 diabetes. It means that your blood sugar is higher than it should be. But it's not high enough to be diabetes. The food you eat naturally turns into sugar. Your body uses the sugar for energy. Normally, an organ called the pancreas makes insulin. And insulin allows the sugar in your blood to get into your body's cells. But sometimes the body can't use insulin the right way. So the sugar stays in your blood instead. This is called insulin resistance. The buildup of sugar in your blood means you have prediabetes. The good news is that you may be able to prevent or delay diabetes. Making small lifestyle changes, like getting active and changing your eating habits, may help you get your blood sugar back to normal. You can work with your doctor to make a treatment plan. Follow-up care is a key part of your treatment and safety.  Be sure to make and go to all appointments, and call your doctor if you are having problems. It's also a good idea to know your test results and keep a list of the medicines you take. How can you care for yourself at home? · Watch your weight. A healthy weight helps your body use insulin properly. · Limit the amount of calories, sweets, and unhealthy fat you eat. Ask your doctor if you should see a dietitian. A registered dietitian can help you create meal plans that fit your lifestyle. · Get at least 30 minutes of exercise on most days of the week. Exercise helps control your blood sugar. It also helps you maintain a healthy weight. Walking is a good choice. You also may want to do other activities, such as running, swimming, cycling, or playing tennis or team sports. · Do not smoke. Smoking can make prediabetes worse. If you need help quitting, talk to your doctor about stop-smoking programs and medicines. These can increase your chances of quitting for good. · If your doctor prescribed medicines, take them exactly as prescribed. Call your doctor if you think you are having a problem with your medicine. You will get more details on the specific medicines your doctor prescribes. When should you call for help? Watch closely for changes in your health, and be sure to contact your doctor if:    · You have any symptoms of diabetes. These may include:  ? Being thirsty more often. ? Urinating more. ? Being hungrier. ? Losing weight. ? Being very tired. ? Having blurry vision.     · You have a wound that will not heal.     · You have an infection that will not go away.     · You have problems with your blood pressure.     · You want more information about diabetes and how you can keep from getting it. Where can you learn more? Go to https://reynaldo.Vibrynt. org and sign in to your [employer] account. Enter I222 in the "[employer] box to learn more about \"Prediabetes: Care Instructions. \"     If you do not have an account, please click on the \"Sign Up Now\" link. Current as of: July 28, 2021               Content Version: 13.1  © 2006-2021 Healthwise, Incorporated. Care instructions adapted under license by [facility] ([facility]). If you have questions about a medical condition or this instruction, always ask your healthcare professional. [address] any warranty or liability for your use of this information.

## 2022-03-14 NOTE — PROGRESS NOTES
Arlette Mai   Date ofBirth:  1970    Date of Visit:  3/14/2022    Chief Complaint   Patient presents with    Abdominal Pain     patient stated to follow up from 2/28 visit, still experiencing some symptoms    Results     wanted to go over recent lab work from 3/11       HPI  Patient still has abdominal pain. Patient states she didn't take Dicyclomine because the cramping. Patient states abdominal pain is mild and  right lower quadrant and not like it was. Patient states her ultrasound showed a fibroid on the right side. Patient has also had a CT abdomen pelvis done. Patient's has seen gynecology and GI. Patient states GI wants to do a colonoscopy and it is scheduled. Patient has hyperlipidemia. Patient takes Pravasatin and states it doesn't help. Patient states she took lipitor in the past and it caused numbness in both arms years ago. Patient does not decrease fat and cholesterol. Patient has prediabetes. Patient does not decrease carbohydrates. Patient had recent labs done and results discussed with patient. .    Review of Systems   Constitutional: Negative for appetite change, chills, fatigue and fever. HENT: Negative for congestion, postnasal drip, rhinorrhea, sinus pressure and sore throat. Eyes: Negative for visual disturbance. Respiratory: Negative for cough, chest tightness, shortness of breath and wheezing. Cardiovascular: Negative for chest pain, palpitations and leg swelling. Gastrointestinal: Positive for abdominal pain. Negative for abdominal distention, blood in stool, constipation, diarrhea, nausea and vomiting. Genitourinary: Negative for dysuria, frequency and hematuria. Musculoskeletal: Positive for arthralgias. Negative for back pain and myalgias. Skin: Negative for rash. Neurological: Negative for dizziness, tremors, syncope, weakness, light-headedness, numbness and headaches.    Psychiatric/Behavioral: Negative for dysphoric mood and Left side of head: No submandibular adenopathy. Neurological:      Mental Status: She is alert and oriented to person, place, and time. Psychiatric:         Mood and Affect: Mood normal.           No results found for this visit on 03/14/22. Lab Review   Orders Only on 03/11/2022   Component Date Value    Urine Culture, Routine 03/11/2022 <10,000 CFU/ml mixed skin/urogenital taryn.  No further workup     Sodium 03/11/2022 143     Potassium 03/11/2022 4.1     Chloride 03/11/2022 104     CO2 03/11/2022 26     Anion Gap 03/11/2022 13     Glucose 03/11/2022 94     BUN 03/11/2022 12     CREATININE 03/11/2022 1.3*    GFR Non- 03/11/2022 43*    GFR  03/11/2022 52*    Calcium 03/11/2022 9.4    Orders Only on 02/21/2022   Component Date Value    Sodium 02/21/2022 145     Potassium 02/21/2022 3.8     Chloride 02/21/2022 104     CO2 02/21/2022 29     Anion Gap 02/21/2022 12     Glucose 02/21/2022 98     BUN 02/21/2022 12     CREATININE 02/21/2022 1.2*    GFR Non- 02/21/2022 47*    GFR  02/21/2022 57*    Calcium 02/21/2022 10.1     Total Protein 02/21/2022 7.1     Albumin 02/21/2022 4.4     Albumin/Globulin Ratio 02/21/2022 1.6     Total Bilirubin 02/21/2022 <0.2     Alkaline Phosphatase 02/21/2022 92     ALT 02/21/2022 16     AST 02/21/2022 21     WBC 02/21/2022 5.0     RBC 02/21/2022 4.41     Hemoglobin 02/21/2022 12.9     Hematocrit 02/21/2022 38.4     MCV 02/21/2022 87.0     MCH 02/21/2022 29.3     MCHC 02/21/2022 33.7     RDW 02/21/2022 13.9     Platelets 46/63/2479 287     MPV 02/21/2022 8.5     Neutrophils % 02/21/2022 61.9     Lymphocytes % 02/21/2022 29.7     Monocytes % 02/21/2022 5.7     Eosinophils % 02/21/2022 1.9     Basophils % 02/21/2022 0.8     Neutrophils Absolute 02/21/2022 3.1     Lymphocytes Absolute 02/21/2022 1.5     Monocytes Absolute 02/21/2022 0.3     Eosinophils Absolute 02/21/2022 0.1  Basophils Absolute 02/21/2022 0.0    Office Visit on 02/21/2022   Component Date Value    Color, UA 02/21/2022 yellow     Clarity, UA 02/21/2022 clear     Glucose, UA POC 02/21/2022 neg     Bilirubin, UA 02/21/2022 neg     Ketones, UA 02/21/2022 neg     Spec Grav, UA 02/21/2022 >=1.030     Blood, UA POC 02/21/2022 trace     pH, UA 02/21/2022 6.5     Protein, UA POC 02/21/2022 neg     Urobilinogen, UA 02/21/2022 0.2     Leukocytes, UA 02/21/2022 neg     Nitrite, UA 02/21/2022 neg    Office Visit on 12/29/2021   Component Date Value    Cholesterol, Fasting 12/29/2021 282*    Triglyceride, Fasting 12/29/2021 102     HDL 12/29/2021 48     LDL Calculated 12/29/2021 214*    VLDL Cholesterol Calcula* 12/29/2021 20     Hemoglobin A1C 12/29/2021 6.0     eAG 12/29/2021 125.5    Admission on 12/27/2021, Discharged on 12/27/2021   Component Date Value    Ventricular Rate 12/27/2021 67     Atrial Rate 12/27/2021 67     P-R Interval 12/27/2021 168     QRS Duration 12/27/2021 82     Q-T Interval 12/27/2021 382     QTc Calculation (Bazett) 12/27/2021 403     P Axis 12/27/2021 59     R Axis 12/27/2021 80     T Axis 12/27/2021 47     Diagnosis 12/27/2021 Sinus rhythm with occasional Premature ventricular complexesNonspecific ST abnormalityConfirmed by [facility] ([id number]) on 12/28/2021 6:51:40 PM     WBC 12/27/2021 6.0     RBC 12/27/2021 4.79     Hemoglobin 12/27/2021 14.0     Hematocrit 12/27/2021 41.9     MCV 12/27/2021 87.5     MCH 12/27/2021 29.2     MCHC 12/27/2021 33.4     RDW 12/27/2021 15.0     Platelets 75/00/3673 305     MPV 12/27/2021 8.7     Neutrophils % 12/27/2021 63.8     Lymphocytes % 12/27/2021 26.7     Monocytes % 12/27/2021 5.8     Eosinophils % 12/27/2021 2.8     Basophils % 12/27/2021 0.9     Neutrophils Absolute 12/27/2021 3.8     Lymphocytes Absolute 12/27/2021 1.6     Monocytes Absolute 12/27/2021 0.3     Eosinophils Absolute 12/27/2021 0.2     Basophils Absolute 12/27/2021 0.1     Sodium 12/27/2021 138     Potassium reflex Magnesi* 12/27/2021 3.7     Chloride 12/27/2021 101     CO2 12/27/2021 24     Anion Gap 12/27/2021 13     Glucose 12/27/2021 96     BUN 12/27/2021 8     CREATININE 12/27/2021 1.1     GFR Non- 12/27/2021 52*    GFR  12/27/2021 >60     Calcium 12/27/2021 10.2     Total Protein 12/27/2021 7.5     Albumin 12/27/2021 4.1     Albumin/Globulin Ratio 12/27/2021 1.2     Total Bilirubin 12/27/2021 0.3     Alkaline Phosphatase 12/27/2021 95     ALT 12/27/2021 19     AST 12/27/2021 22     Troponin 12/27/2021 <0.01     Pro-BNP 12/27/2021 36     D-Dimer, Quant 12/27/2021 255*    TSH 12/27/2021 1.42     hCG Qual 12/27/2021 Negative          Assessment/Plan     1. Right lower quadrant abdominal pain  -better but still mild  -patient reports pelvic ultrasound showed uterine fibroid side  -Follow up with GI for colonoscopy   -Tylenol (Acetaminophen) 650mg 3 times daily as needed    2. Abdominal cramping  -resolved    3. Stage 3a chronic kidney disease (HCC)  -Avoid NSAID's such as otc Ibuprofen, Advil, Motrin, Naprosyn, and Aleve as well as prescription NSAID's  -Referral to [facility]) Len Han MD, Nephrology, [facility]    4. Prediabetes  -last Hemoglobin A1c 6.0%   -Low carbohydrate diet  -Regular aerobic exercise    5. Mixed hyperlipidemia  -last lipids shows very high cholesterol and LDL   -discontinue Pravastatin   -start rosuvastatin (CRESTOR) 20 MG tablet; Take 1 tablet by mouth daily  Dispense: 30 tablet; Refill: 3  -Low fat, low cholesterol diet  -Regular aerobic exercise      Discussed medications with patient, who voiced understanding of their use and indications. All questions answered.     Discussed recent lab results with patient      Return in about 6 weeks (around 4/25/2022) for annual physical exam.

## 2022-03-16 ASSESSMENT — ENCOUNTER SYMPTOMS
VOMITING: 0
CONSTIPATION: 0
WHEEZING: 0
NAUSEA: 0
SORE THROAT: 0
CHEST TIGHTNESS: 0
COUGH: 0
BLOOD IN STOOL: 0
SHORTNESS OF BREATH: 0
SINUS PRESSURE: 0
ABDOMINAL DISTENTION: 0
RHINORRHEA: 0
ABDOMINAL PAIN: 1
BACK PAIN: 0
DIARRHEA: 0

## 2022-06-14 ENCOUNTER — OFFICE VISIT (OUTPATIENT)
Dept: PRIMARY CARE CLINIC | Age: 52
End: 2022-06-14
Payer: COMMERCIAL

## 2022-06-14 ENCOUNTER — NURSE TRIAGE (OUTPATIENT)
Dept: OTHER | Facility: CLINIC | Age: 52
End: 2022-06-14

## 2022-06-14 VITALS
DIASTOLIC BLOOD PRESSURE: 84 MMHG | HEART RATE: 68 BPM | WEIGHT: 177 LBS | SYSTOLIC BLOOD PRESSURE: 114 MMHG | OXYGEN SATURATION: 98 % | BODY MASS INDEX: 33.44 KG/M2 | RESPIRATION RATE: 16 BRPM | TEMPERATURE: 97.2 F

## 2022-06-14 DIAGNOSIS — M79.621 PAIN OF RIGHT UPPER ARM: ICD-10-CM

## 2022-06-14 DIAGNOSIS — L29.9 ITCHING: ICD-10-CM

## 2022-06-14 DIAGNOSIS — R21 RASH: Primary | ICD-10-CM

## 2022-06-14 PROCEDURE — 99213 OFFICE O/P EST LOW 20 MIN: CPT | Performed by: INTERNAL MEDICINE

## 2022-06-14 RX ORDER — CETIRIZINE HYDROCHLORIDE 10 MG/1
10 TABLET ORAL DAILY
Qty: 30 TABLET | Refills: 0 | Status: SHIPPED | OUTPATIENT
Start: 2022-06-14 | End: 2022-07-14

## 2022-06-14 RX ORDER — TRIAMCINOLONE ACETONIDE 1 MG/G
CREAM TOPICAL
Qty: 30 G | Refills: 2 | Status: SHIPPED | OUTPATIENT
Start: 2022-06-14

## 2022-06-14 ASSESSMENT — PATIENT HEALTH QUESTIONNAIRE - PHQ9
9. THOUGHTS THAT YOU WOULD BE BETTER OFF DEAD, OR OF HURTING YOURSELF: 0
1. LITTLE INTEREST OR PLEASURE IN DOING THINGS: 0
SUM OF ALL RESPONSES TO PHQ QUESTIONS 1-9: 0
2. FEELING DOWN, DEPRESSED OR HOPELESS: 0
4. FEELING TIRED OR HAVING LITTLE ENERGY: 0
3. TROUBLE FALLING OR STAYING ASLEEP: 0
5. POOR APPETITE OR OVEREATING: 0
SUM OF ALL RESPONSES TO PHQ QUESTIONS 1-9: 0
SUM OF ALL RESPONSES TO PHQ QUESTIONS 1-9: 0
7. TROUBLE CONCENTRATING ON THINGS, SUCH AS READING THE NEWSPAPER OR WATCHING TELEVISION: 0
8. MOVING OR SPEAKING SO SLOWLY THAT OTHER PEOPLE COULD HAVE NOTICED. OR THE OPPOSITE, BEING SO FIGETY OR RESTLESS THAT YOU HAVE BEEN MOVING AROUND A LOT MORE THAN USUAL: 0
SUM OF ALL RESPONSES TO PHQ QUESTIONS 1-9: 0
10. IF YOU CHECKED OFF ANY PROBLEMS, HOW DIFFICULT HAVE THESE PROBLEMS MADE IT FOR YOU TO DO YOUR WORK, TAKE CARE OF THINGS AT HOME, OR GET ALONG WITH OTHER PEOPLE: 0
6. FEELING BAD ABOUT YOURSELF - OR THAT YOU ARE A FAILURE OR HAVE LET YOURSELF OR YOUR FAMILY DOWN: 0
SUM OF ALL RESPONSES TO PHQ9 QUESTIONS 1 & 2: 0

## 2022-06-14 NOTE — PROGRESS NOTES
Arlette Mai   Date ofBirth:  1970    Date of Visit:  6/14/2022    Chief Complaint   Patient presents with    Rash     R arm       HPI  Patient complains of rash for 1 week on right arm. Patient has had red bumps and itching and some pain right arm. Patient states rash got worse on Saturday and spread down her arm and it hurt. Patient states some of the bumps have been clustering together. Patient denies change in detergents, lotions, and soaps. Patient is using Clobetasol cream and it is not helping. Patient states she works outside a lot. Review of Systems   Constitutional: Negative for chills and fever. Musculoskeletal: Positive for myalgias. Skin: Positive for rash. Negative for color change and wound. Allergies   Allergen Reactions    Latex Hives    Fluconazole In Dextrose      Vaginal burning    Iodine     Tramadol Itching     Outpatient Medications Marked as Taking for the 6/14/22 encounter (Office Visit) with Main Guzmán MD   Medication Sig Dispense Refill    rosuvastatin (CRESTOR) 20 MG tablet Take 1 tablet by mouth daily 30 tablet 3    clobetasol (TEMOVATE) 0.05 % ointment Apply topically 2 times daily. (Patient taking differently: as needed Apply topically 2 times daily.) 30 g 1         Vitals:    06/14/22 1444   BP: 114/84   Pulse: 68   Resp: 16   Temp: 97.2 °F (36.2 °C)   SpO2: 98%   Weight: 177 lb (80.3 kg)     Body mass index is 33.44 kg/m². Physical Exam  Vitals and nursing note reviewed. Constitutional:       General: She is not in acute distress. Appearance: Normal appearance. HENT:      Head: Normocephalic and atraumatic. Eyes:      Extraocular Movements: Extraocular movements intact. Pupils: Pupils are equal, round, and reactive to light. Musculoskeletal:      Right upper arm: Tenderness (in area of rash) present. No swelling. Skin:     Findings: Rash present. Rash is papular.  Rash is not pustular or urticarial.      Comments: Right arm with 3 cm x 3 cm circular erythematous papular rash with area of clearing in center with papular area in middle of circular area, tender, no vesicles, no pustules   Neurological:      Mental Status: She is alert. No results found for this visit on 06/14/22. Lab Review   Orders Only on 03/11/2022   Component Date Value    Urine Culture, Routine 03/11/2022 <10,000 CFU/ml mixed skin/urogenital taryn.  No further workup     Sodium 03/11/2022 143     Potassium 03/11/2022 4.1     Chloride 03/11/2022 104     CO2 03/11/2022 26     Anion Gap 03/11/2022 13     Glucose 03/11/2022 94     BUN 03/11/2022 12     CREATININE 03/11/2022 1.3*    GFR Non- 03/11/2022 43*    GFR  03/11/2022 52*    Calcium 03/11/2022 9.4    Orders Only on 02/21/2022   Component Date Value    Sodium 02/21/2022 145     Potassium 02/21/2022 3.8     Chloride 02/21/2022 104     CO2 02/21/2022 29     Anion Gap 02/21/2022 12     Glucose 02/21/2022 98     BUN 02/21/2022 12     CREATININE 02/21/2022 1.2*    GFR Non- 02/21/2022 47*    GFR  02/21/2022 57*    Calcium 02/21/2022 10.1     Total Protein 02/21/2022 7.1     Albumin 02/21/2022 4.4     Albumin/Globulin Ratio 02/21/2022 1.6     Total Bilirubin 02/21/2022 <0.2     Alkaline Phosphatase 02/21/2022 92     ALT 02/21/2022 16     AST 02/21/2022 21     WBC 02/21/2022 5.0     RBC 02/21/2022 4.41     Hemoglobin 02/21/2022 12.9     Hematocrit 02/21/2022 38.4     MCV 02/21/2022 87.0     MCH 02/21/2022 29.3     MCHC 02/21/2022 33.7     RDW 02/21/2022 13.9     Platelets 53/76/8801 287     MPV 02/21/2022 8.5     Neutrophils % 02/21/2022 61.9     Lymphocytes % 02/21/2022 29.7     Monocytes % 02/21/2022 5.7     Eosinophils % 02/21/2022 1.9     Basophils % 02/21/2022 0.8     Neutrophils Absolute 02/21/2022 3.1     Lymphocytes Absolute 02/21/2022 1.5     Monocytes Absolute 02/21/2022 0.3     Eosinophils Absolute 02/21/2022 0.1     Basophils Absolute 02/21/2022 0.0    Office Visit on 02/21/2022   Component Date Value    Color, UA 02/21/2022 yellow     Clarity, UA 02/21/2022 clear     Glucose, UA POC 02/21/2022 neg     Bilirubin, UA 02/21/2022 neg     Ketones, UA 02/21/2022 neg     Spec Grav, UA 02/21/2022 >=1.030     Blood, UA POC 02/21/2022 trace     pH, UA 02/21/2022 6.5     Protein, UA POC 02/21/2022 neg     Urobilinogen, UA 02/21/2022 0.2     Leukocytes, UA 02/21/2022 neg     Nitrite, UA 02/21/2022 neg    Office Visit on 12/29/2021   Component Date Value    Cholesterol, Fasting 12/29/2021 282*    Triglyceride, Fasting 12/29/2021 102     HDL 12/29/2021 48     LDL Calculated 12/29/2021 214*    VLDL Cholesterol Calcula* 12/29/2021 20     Hemoglobin A1C 12/29/2021 6.0     eAG 12/29/2021 125.5    Admission on 12/27/2021, Discharged on 12/27/2021   Component Date Value    Ventricular Rate 12/27/2021 67     Atrial Rate 12/27/2021 67     P-R Interval 12/27/2021 168     QRS Duration 12/27/2021 82     Q-T Interval 12/27/2021 382     QTc Calculation (Bazett) 12/27/2021 403     P Axis 12/27/2021 59     R Axis 12/27/2021 80     T Axis 12/27/2021 47     Diagnosis 12/27/2021 Sinus rhythm with occasional Premature ventricular complexesNonspecific ST abnormalityConfirmed by [facility] OSMANY MERCADO ([id number]) on 12/28/2021 6:51:40 PM     WBC 12/27/2021 6.0     RBC 12/27/2021 4.79     Hemoglobin 12/27/2021 14.0     Hematocrit 12/27/2021 41.9     MCV 12/27/2021 87.5     MCH 12/27/2021 29.2     MCHC 12/27/2021 33.4     RDW 12/27/2021 15.0     Platelets 49/90/6009 305     MPV 12/27/2021 8.7     Neutrophils % 12/27/2021 63.8     Lymphocytes % 12/27/2021 26.7     Monocytes % 12/27/2021 5.8     Eosinophils % 12/27/2021 2.8     Basophils % 12/27/2021 0.9     Neutrophils Absolute 12/27/2021 3.8     Lymphocytes Absolute 12/27/2021 1.6     Monocytes Absolute 12/27/2021 0.3     Eosinophils Absolute 12/27/2021 0.2     Basophils Absolute 12/27/2021 0.1     Sodium 12/27/2021 138     Potassium reflex Magnesi* 12/27/2021 3.7     Chloride 12/27/2021 101     CO2 12/27/2021 24     Anion Gap 12/27/2021 13     Glucose 12/27/2021 96     BUN 12/27/2021 8     CREATININE 12/27/2021 1.1     GFR Non- 12/27/2021 52*    GFR  12/27/2021 >60     Calcium 12/27/2021 10.2     Total Protein 12/27/2021 7.5     Albumin 12/27/2021 4.1     Albumin/Globulin Ratio 12/27/2021 1.2     Total Bilirubin 12/27/2021 0.3     Alkaline Phosphatase 12/27/2021 95     ALT 12/27/2021 19     AST 12/27/2021 22     Troponin 12/27/2021 <0.01     Pro-BNP 12/27/2021 36     D-Dimer, Quant 12/27/2021 255*    TSH 12/27/2021 1.42     hCG Qual 12/27/2021 Negative          Assessment/Plan     1. Rash  - triamcinolone (KENALOG) 0.1 % cream; Apply topically 2 times daily. Dispense: 30 g; Refill: 2  -stop Clobetasol    2. Itching  - cetirizine (ZYRTEC) 10 MG tablet; Take 1 tablet by mouth daily  Dispense: 30 tablet; Refill: 0  - triamcinolone (KENALOG) 0.1 % cream; Apply topically 2 times daily. Dispense: 30 g; Refill: 2  - stop clobetasol    3. Pain of right upper arm  - Tylenol 650mg 3 times daily as needed          Discussed medications with patient, who voiced understanding of their use and indications. All questions answered. Return if symptoms worsen or fail to improve.

## 2022-06-14 NOTE — PATIENT INSTRUCTIONS
1. Rash  - triamcinolone (KENALOG) 0.1 % cream; Apply topically 2 times daily. Dispense: 30 g; Refill: 2  -stop Clobetasol    2. Itching  - cetirizine (ZYRTEC) 10 MG tablet; Take 1 tablet by mouth daily  Dispense: 30 tablet; Refill: 0  - triamcinolone (KENALOG) 0.1 % cream; Apply topically 2 times daily. Dispense: 30 g; Refill: 2  - stop clobetasol    3.  Pain of right upper arm  - Tylenol 650mg 3 times daily as needed

## 2022-06-14 NOTE — TELEPHONE ENCOUNTER
Received call from [facility] with Red Flag Complaint. Subjective: Caller states \"rash on right arm for about a week. Is itchy, red and swollen. \"     Current Symptoms: rash on right arm   Was red, some patches that were clustered together. Now is swollen, a cluster of bumps and raised and itching  Some pain     Onset: 1 week ago; worsening    Associated Symptoms: appears colicky    Pain Severity: 7/10    Temperature: no fever    What has been tried: calamine lotion at first, had similar rash in the past and was given an rx cream-using that but is not helping. LMP: NA Pregnant: No    Recommended disposition: See in Office Today    Care advice provided, patient verbalizes understanding; denies any other questions or concerns; instructed to call back for any new or worsening symptoms. Patient/Caller agrees with recommended disposition; writer provided warm transfer to [facility] at [facility] for appointment scheduling     Attention Provider: Thank you for allowing me to participate in the care of your patient. The patient was connected to triage in response to information provided to the ECC/PSC. Please do not respond through this encounter as the response is not directed to a shared pool.     Reason for Disposition   Painful rash with multiple small blisters grouped together (i.e., dermatomal distribution or 'band' or 'stripe')    Protocols used: RASH OR REDNESS - LOCALIZED-ADULT-[place]

## 2022-06-21 ENCOUNTER — OFFICE VISIT (OUTPATIENT)
Dept: PRIMARY CARE CLINIC | Age: 52
End: 2022-06-21
Payer: COMMERCIAL

## 2022-06-21 VITALS
TEMPERATURE: 97.3 F | OXYGEN SATURATION: 99 % | HEART RATE: 71 BPM | BODY MASS INDEX: 33.08 KG/M2 | HEIGHT: 61 IN | WEIGHT: 175.2 LBS | SYSTOLIC BLOOD PRESSURE: 122 MMHG | DIASTOLIC BLOOD PRESSURE: 82 MMHG | RESPIRATION RATE: 16 BRPM

## 2022-06-21 DIAGNOSIS — Z11.59 NEED FOR HEPATITIS C SCREENING TEST: ICD-10-CM

## 2022-06-21 DIAGNOSIS — R73.03 PREDIABETES: ICD-10-CM

## 2022-06-21 DIAGNOSIS — Z12.31 ENCOUNTER FOR SCREENING MAMMOGRAM FOR BREAST CANCER: ICD-10-CM

## 2022-06-21 DIAGNOSIS — Z23 NEED FOR SHINGLES VACCINE: ICD-10-CM

## 2022-06-21 DIAGNOSIS — L29.9 ITCHING: ICD-10-CM

## 2022-06-21 DIAGNOSIS — E78.2 MIXED HYPERLIPIDEMIA: ICD-10-CM

## 2022-06-21 DIAGNOSIS — Z23 NEED FOR TDAP VACCINATION: ICD-10-CM

## 2022-06-21 DIAGNOSIS — Z00.00 ANNUAL PHYSICAL EXAM: Primary | ICD-10-CM

## 2022-06-21 DIAGNOSIS — N18.31 STAGE 3A CHRONIC KIDNEY DISEASE (HCC): ICD-10-CM

## 2022-06-21 DIAGNOSIS — R21 RASH: ICD-10-CM

## 2022-06-21 PROCEDURE — 99396 PREV VISIT EST AGE 40-64: CPT | Performed by: INTERNAL MEDICINE

## 2022-06-21 RX ORDER — ZOSTER VACCINE RECOMBINANT, ADJUVANTED 50 MCG/0.5
0.5 KIT INTRAMUSCULAR SEE ADMIN INSTRUCTIONS
Qty: 0.5 ML | Refills: 0 | Status: SHIPPED | OUTPATIENT
Start: 2022-06-21 | End: 2022-12-18

## 2022-06-21 RX ORDER — ROSUVASTATIN CALCIUM 20 MG/1
20 TABLET, COATED ORAL DAILY
Qty: 30 TABLET | Refills: 0 | Status: SHIPPED | OUTPATIENT
Start: 2022-06-21 | End: 2022-08-30

## 2022-06-21 NOTE — PROGRESS NOTES
Well Adult Note  Name: Kaiden Ramsey Date: 2022   MRN: [id number] Sex: Female   Age: 49 y.o. Ethnicity: Non- / Non    : 1970 Race: Rob Alec / African American      Chief Complaint   Patient presents with    Annual Exam       DBelia Gonzalez is here for well adult exam.  History:    Patient presents for annual physical exam. Last pap 2 years ago. Mammogram due. Patient has prediabetes. Patient decreases carbohydrates    Patient has hyperlipidemia. Patient takes rosuvastatin. Patient states she does not eat a lot of fatty foods. Patient states she does eat a lot of dairy due to lactose intolerance. Patient does not exercise. Patient has chronic kidney disease. Patient avoids NSAIDs. Patient states she has increased water. Patient has a rash and itching. Patient states Zyrtec and cream help and she is not taking Zyrtec as much. Review of Systems   Constitutional: Negative for activity change, appetite change, chills, diaphoresis, fatigue, fever and unexpected weight change. HENT: Negative for congestion, ear discharge, ear pain, facial swelling, hearing loss, mouth sores, nosebleeds, postnasal drip, rhinorrhea, sinus pressure, sinus pain, sneezing, sore throat, tinnitus, trouble swallowing and voice change. Eyes: Negative for photophobia, pain, discharge, redness, itching and visual disturbance. Respiratory: Negative for apnea, cough, choking, chest tightness, shortness of breath and wheezing. Cardiovascular: Negative for chest pain, palpitations and leg swelling. Gastrointestinal: Negative for abdominal distention, abdominal pain, anal bleeding, blood in stool, constipation, diarrhea, nausea, rectal pain and vomiting. Endocrine: Negative for cold intolerance and heat intolerance.    Genitourinary: Negative for decreased urine volume, difficulty urinating, dysuria, flank pain, frequency, genital sores, hematuria, menstrual problem, pelvic pain, urgency, vaginal bleeding, vaginal discharge and vaginal pain. Musculoskeletal: Positive for arthralgias. Negative for back pain, gait problem, joint swelling, myalgias, neck pain and neck stiffness. Skin: Positive for rash. Negative for wound. Neurological: Negative for dizziness, tremors, seizures, syncope, facial asymmetry, speech difficulty, weakness, light-headedness, numbness and headaches. Hematological: Negative for adenopathy. Does not bruise/bleed easily. Psychiatric/Behavioral: Negative for decreased concentration, dysphoric mood and sleep disturbance. The patient is not nervous/anxious. All other systems reviewed and are negative. Allergies   Allergen Reactions    Latex Hives    Fluconazole In Dextrose      Vaginal burning    Iodine     Tramadol Itching         Prior to Visit Medications    Medication Sig Taking? Authorizing Provider   rosuvastatin (CRESTOR) 20 MG tablet Take 1 tablet by mouth daily Yes Jinny Lanier MD   zoster recombinant adjuvanted vaccine [facility]) 50 MCG/0.5ML SUSR injection Inject 0.5 mLs into the muscle See Admin Instructions 1 dose now and repeat in 2-6 months Yes Jinny Lanier MD   cetirizine (ZYRTEC) 10 MG tablet Take 1 tablet by mouth daily  Patient taking differently: Take 10 mg by mouth as needed  Yes Jinny Lanier MD   triamcinolone (KENALOG) 0.1 % cream Apply topically 2 times daily. Patient not taking: Reported on 6/27/2022 Yes Jinny Lanier MD   clobetasol (TEMOVATE) 0.05 % ointment Apply topically 2 times daily. Patient taking differently: as needed Apply topically 2 times daily. Yes BESSIE Martines - CNP   methylPREDNISolone (MEDROL DOSEPACK) 4 MG tablet Take by mouth.   Jinny Lanier MD   hydrOXYzine HCl (ATARAX) 25 MG tablet Take 1 tablet by mouth 3 times daily as needed for Itching  Jinny Lanier MD         Past Medical History:   Diagnosis Date    Depression     High cholesterol     unmedicated due to side-effects    History of palpitations     per pt: refused medication at time of diagnosis    Low back pain        Past Surgical History:   Procedure Laterality Date    BREAST BIOPSY      UTERINE FIBROID EMBOLIZATION  2012         Family History   Problem Relation Age of Onset    Heart Disease Mother 61    Arthritis Mother     Diabetes Father 61    Heart Disease Father     Kidney Disease Father     Hypertension Brother     Diabetes Brother     Diabetes Maternal Grandmother        Social History     Tobacco Use    Smoking status: Never Smoker    Smokeless tobacco: Never Used   Substance Use Topics    Alcohol use: Yes     Alcohol/week: 0.0 standard drinks     Comment: Occasionally    Drug use: No       Objective   /82   Pulse 71   Temp 97.3 °F (36.3 °C)   Resp 16   Ht 5' 1\" (1.549 m)   Wt 175 lb 3.2 oz (79.5 kg)   SpO2 99%   BMI 33.10 kg/m²   Wt Readings from Last 3 Encounters:   06/27/22 175 lb (79.4 kg)   06/21/22 175 lb 3.2 oz (79.5 kg)   06/14/22 177 lb (80.3 kg)     Additional Measurements    06/21/22 1123   Waist (Inches): 32 in         Physical Exam  Constitutional:       General: She is not in acute distress. Appearance: Normal appearance. HENT:      Head: Normocephalic and atraumatic. Right Ear: Tympanic membrane, ear canal and external ear normal.      Left Ear: Tympanic membrane, ear canal and external ear normal.      Nose: Nose normal.   Eyes:      General: Lids are normal.      Extraocular Movements: Extraocular movements intact. Conjunctiva/sclera: Conjunctivae normal.      Pupils: Pupils are equal, round, and reactive to light. Neck:      Thyroid: No thyromegaly. Vascular: No carotid bruit. Cardiovascular:      Rate and Rhythm: Normal rate and regular rhythm. Heart sounds: Normal heart sounds, S1 normal and S2 normal. No murmur heard. Pulmonary:      Effort: Pulmonary effort is normal.      Breath sounds: Normal breath sounds. have done at her pharmacy  -Patient declines Shingrix today and patient given prescription for Shingrix vaccine to have done at her [address] [place] [address] done on 7/28/2021  -COVID-19 vaccine done on 1/28/2021, 2/20/2021, and booster done on 12/8/2021  -Regular aerobic exercise    2. Mixed hyperlipidemia  -Continue rosuvastatin (CRESTOR) 20 MG tablet; Take 1 tablet by mouth daily  Dispense: 30 tablet; Refill: 0  -Low fat, low cholesterol diet  -Regular aerobic exercise  - Lipid Panel; Future    3. Prediabetes  -Low carbohydrate diet  -Regular aerobic exercise  - Hemoglobin A1C; Future    4. Stage 3a chronic kidney disease (HCC)  -stable  -Avoid NSAIDs    5. Itching  -Better  -Continue Zyrtec 10 mg once daily as needed  -Continue triamcinolone cream 2 times daily as needed    6. Rash  -Better  -Continue Zyrtec 10 mg once daily as needed  -Continue triamcinolone cream 2 times daily as needed    7. Need for shingles vaccine  -Patient given prescription for Shingrix vaccine to have done at their pharmacy  - zoster recombinant adjuvanted vaccine [facility]) 50 MCG/0.5ML SUSR injection; Inject 0.5 mLs into the muscle See Admin Instructions 1 dose now and repeat in 2-6 months  Dispense: 0.5 mL; Refill: 0    8. Need for Tdap vaccination  -Patient given Rx for Tdap vaccine to have done at their pharmacy  - Tetanus-Diphth-Acell Pertussis ([address]) 5-2.5-18.5 LF-MCG/0.5 injection; Inject 0.5 mLs into the muscle once for 1 dose  Dispense: 0.5 mL; Refill: 0    9. Need for hepatitis C screening test  - Hepatitis C Antibody; Future    10. Encounter for screening mammogram for breast cancer  - [facility] DIMITRI DIGITAL SCREEN BILATERAL;  Future          Personalized Preventive Plan   Current Health Maintenance Status  Immunization History   Administered Date(s) Administered    COVID-19, PFIZER PURPLE top, DILUTE for use, (age 15 y+), 30mcg/0.3mL 01/28/2021, 02/20/2021, 12/08/2021        Health Maintenance   Topic Date Due    HIV screen Never done    Hepatitis C screen  Never done    DTaP/Tdap/Td vaccine (1 - Tdap) 02/21/2023 (Originally 8/3/1990)    Flu vaccine (Season Ended) 02/21/2023 (Originally 9/1/2022)    Shingles vaccine (1 of 2) 02/21/2023 (Originally 8/3/2021)    A1C test (Diabetic or Prediabetic)  12/29/2022    Lipids  12/29/2022    Depression Monitoring  06/14/2023    Cervical cancer screen  04/12/2024    Breast cancer screen  06/27/2024    Colorectal Cancer Screen  07/28/2024    COVID-19 Vaccine  Completed    Hepatitis A vaccine  Aged Out    Hepatitis B vaccine  Aged Out    Hib vaccine  Aged Out    Meningococcal (ACWY) vaccine  Aged Out    Pneumococcal 0-64 years Vaccine  Aged Out     Recommendations for [employer] Due: see orders and patient instructions/AVS.    Return in about 6 months (around 12/21/2022) for hyperlipidemia and prediabetes.

## 2022-06-21 NOTE — PATIENT INSTRUCTIONS
Well Visit, Ages 25 to 48: Care Instructions  Overview     Well visits can help you stay healthy. Your doctor has checked your overall health and may have suggested ways to take good care of yourself. Your doctor also may have recommended tests. At home, you can help prevent illness withhealthy eating, regular exercise, and other steps. Follow-up care is a key part of your treatment and safety. Be sure to make and go to all appointments, and call your doctor if you are having problems. It's also a good idea to know your test results and keep alist of the medicines you take. How can you care for yourself at home?  Get screening tests that you and your doctor decide on. Screening helps find diseases before any symptoms appear.  Eat healthy foods. Choose fruits, vegetables, whole grains, protein, and low-fat dairy foods. Limit fat, especially saturated fat. Reduce salt in your diet.  Limit alcohol. If you are a man, have no more than 2 drinks a day or 14 drinks a week. If you are a woman, have no more than 1 drink a day or 7 drinks a week.  Get at least 30 minutes of physical activity on most days of the week. Walking is a good choice. You also may want to do other activities, such as running, swimming, cycling, or playing tennis or team sports. Discuss any changes in your exercise program with your doctor.  Reach and stay at a healthy weight. This will lower your risk for many problems, such as obesity, diabetes, heart disease, and high blood pressure.  Do not smoke or allow others to smoke around you. If you need help quitting, talk to your doctor about stop-smoking programs and medicines. These can increase your chances of quitting for good.  Care for your mental health. It is easy to get weighed down by worry and stress. Learn strategies to manage stress, like deep breathing and mindfulness, and stay connected with your family and community.  If you find you often feel sad or hopeless, talk with your doctor. Treatment can help.  Talk to your doctor about whether you have any risk factors for sexually transmitted infections (STIs). You can help prevent STIs if you wait to have sex with a new partner (or partners) until you've each been tested for STIs. It also helps if you use condoms (male or female condoms) and if you limit your sex partners to one person who only has sex with you. Vaccines are available for some STIs, such as HPV.  Use birth control if it's important to you to prevent pregnancy. Talk with your doctor about the choices available and what might be best for you.  If you think you may have a problem with alcohol or drug use, talk to your doctor. This includes prescription medicines (such as amphetamines and opioids) and illegal drugs (such as cocaine and methamphetamine). Your doctor can help you figure out what type of treatment is best for you.  Protect your skin from too much sun. When you're outdoors from 10 a.m. to 4 p.m., stay in the shade or cover up with clothing and a hat with a wide brim. Wear sunglasses that block UV rays. Even when it's cloudy, put broad-spectrum sunscreen (SPF 30 or higher) on any exposed skin.  See a dentist one or two times a year for checkups and to have your teeth cleaned.  Wear a seat belt in the car. When should you call for help? Watch closely for changes in your health, and be sure to contact your doctor if you have any problems or symptoms that concern you. Where can you learn more? Go to https://reynaldo.[employer]partners. org and sign in to your [employer] account. Enter P072 in the [facility] box to learn more about \"Well Visit, Ages 25 to 48: Care Instructions. \"     If you do not have an account, please click on the \"Sign Up Now\" link. Current as of: October 6, 2021               Content Version: 13.3  © 1108-0373 Healthwise, Incorporated. Care instructions adapted under license by [facility] ([facility]).  If you have saturated fat. ? Limit sodium and alcohol. ? Limit drinks and foods with added sugar.  Be physically active. Try to do moderate activity at least 2½ hours a week. Or try to do vigorous activity at least 1¼ hours a week. You may want to walk or try other activities, such as running, swimming, cycling, or playing tennis or team sports.  Stay at a healthy weight or lose weight by making the changes in eating and physical activity listed above. Losing just a small amount of weight, even 5 to 10 pounds, can help reduce your risk for having a heart attack or stroke.  Do not smoke.  Manage other health problems. These include diabetes and high blood pressure. If you think you may have a problem with alcohol or drug use, talk to your doctor.  If you take medicine, take it exactly as prescribed. Call your doctor if you think you are having a problem with your medicine.  Check with your doctor or pharmacist before you use any other medicines, including over-the-counter medicines. Make sure your doctor knows all of the medicines, vitamins, herbal products, and supplements you take. Taking some medicines together can cause problems. When should you call for help? Watch closely for changes in your health, and be sure to contact your doctor if:     You need help making lifestyle changes.      You have questions about your medicine. Where can you learn more? Go to https://[email or web address].[employer]. org and sign in to your [employer] account. Enter C627 in the [facility] box to learn more about \"High Cholesterol: Care Instructions. \"     If you do not have an account, please click on the \"Sign Up Now\" link. Current as of: January 10, 2022               Content Version: 13.3  © 5103-3689 Healthwise, Incorporated. Care instructions adapted under license by [facility] ([facility]).  If you have questions about a medical condition or this instruction, always ask your healthcare professional. [employer] disclaims any warranty or liability for your use of this information.

## 2022-06-22 DIAGNOSIS — E78.2 MIXED HYPERLIPIDEMIA: ICD-10-CM

## 2022-06-22 RX ORDER — ROSUVASTATIN CALCIUM 20 MG/1
20 TABLET, COATED ORAL DAILY
Qty: 30 TABLET | Refills: 0 | OUTPATIENT
Start: 2022-06-22

## 2022-06-23 PROBLEM — M79.621 PAIN OF RIGHT UPPER ARM: Status: ACTIVE | Noted: 2022-06-23

## 2022-06-23 PROBLEM — L29.9 ITCHING: Status: ACTIVE | Noted: 2022-06-23

## 2022-06-23 PROBLEM — R21 RASH: Status: ACTIVE | Noted: 2022-06-23

## 2022-06-23 ASSESSMENT — ENCOUNTER SYMPTOMS: COLOR CHANGE: 0

## 2022-06-27 ENCOUNTER — OFFICE VISIT (OUTPATIENT)
Dept: PRIMARY CARE CLINIC | Age: 52
End: 2022-06-27
Payer: COMMERCIAL

## 2022-06-27 ENCOUNTER — HOSPITAL ENCOUNTER (OUTPATIENT)
Dept: WOMENS IMAGING | Age: 52
Discharge: HOME | End: 2022-06-27
Payer: COMMERCIAL

## 2022-06-27 VITALS
DIASTOLIC BLOOD PRESSURE: 82 MMHG | WEIGHT: 175 LBS | OXYGEN SATURATION: 100 % | BODY MASS INDEX: 33.07 KG/M2 | RESPIRATION RATE: 16 BRPM | SYSTOLIC BLOOD PRESSURE: 128 MMHG | TEMPERATURE: 97.8 F | HEART RATE: 81 BPM

## 2022-06-27 DIAGNOSIS — Z12.31 ENCOUNTER FOR SCREENING MAMMOGRAM FOR BREAST CANCER: ICD-10-CM

## 2022-06-27 DIAGNOSIS — L29.9 ITCHING: ICD-10-CM

## 2022-06-27 DIAGNOSIS — R21 RASH: Primary | ICD-10-CM

## 2022-06-27 PROCEDURE — 77063 BREAST TOMOSYNTHESIS BI: CPT

## 2022-06-27 PROCEDURE — 99213 OFFICE O/P EST LOW 20 MIN: CPT | Performed by: INTERNAL MEDICINE

## 2022-06-27 RX ORDER — METHYLPREDNISOLONE 4 MG/1
TABLET ORAL
Qty: 1 KIT | Refills: 0 | Status: SHIPPED | OUTPATIENT
Start: 2022-06-27 | End: 2022-07-03

## 2022-06-27 RX ORDER — HYDROXYZINE HYDROCHLORIDE 25 MG/1
25 TABLET, FILM COATED ORAL 3 TIMES DAILY PRN
Qty: 15 TABLET | Refills: 0 | Status: SHIPPED | OUTPATIENT
Start: 2022-06-27

## 2022-06-27 NOTE — PROGRESS NOTES
Arlette Mai   Date ofBirth:  1970    Date of Visit:  6/27/2022    Chief Complaint   Patient presents with    Rash     itching, on pt arm, neck, face, lips. HPI  Patient complains of rash on sides of neck, small bumps with itching, bilateral arms, face was swollen and rash after last visit last week. Patient states now she has rash and swelling on lips. Patient states anything with acidic or spice bothers her lips. Patient states it seems like same rash is spreading and not getting better. Calamine lotion takes the edge off temporarily for a couple of hours. Patient tried Benadryl at night and slept but not sure it helped the rash. Patient states she got a new puppy 4 weeks ago. Patient states she has had dogs her whole life and has not been allergic to dogs before. Review of Systems   Constitutional: Negative for chills and fever. HENT: Positive for facial swelling. Skin: Positive for rash. Negative for color change and wound. Allergies   Allergen Reactions    Latex Hives    Fluconazole In Dextrose      Vaginal burning    Iodine     Tramadol Itching     Outpatient Medications Marked as Taking for the 6/27/22 encounter (Office Visit) with Juany Alston MD   Medication Sig Dispense Refill    rosuvastatin (CRESTOR) 20 MG tablet Take 1 tablet by mouth daily 30 tablet 0    zoster recombinant adjuvanted vaccine [facility]) 50 MCG/0.5ML SUSR injection Inject 0.5 mLs into the muscle See Admin Instructions 1 dose now and repeat in 2-6 months 0.5 mL 0    cetirizine (ZYRTEC) 10 MG tablet Take 1 tablet by mouth daily (Patient taking differently: Take 10 mg by mouth as needed ) 30 tablet 0    clobetasol (TEMOVATE) 0.05 % ointment Apply topically 2 times daily.  (Patient taking differently: as needed Apply topically 2 times daily.) 30 g 1         Vitals:    06/27/22 1318   BP: 128/82   Pulse: 81   Resp: 16   Temp: 97.8 °F (36.6 °C)   SpO2: 100%   Weight: 175 lb (79.4 kg) Body mass index is 33.07 kg/m². Physical Exam  Vitals and nursing note reviewed. Constitutional:       Appearance: Normal appearance. HENT:      Head: Normocephalic and atraumatic. Eyes:      Extraocular Movements: Extraocular movements intact. Pupils: Pupils are equal, round, and reactive to light. Skin:     Findings: Rash present. Rash is papular. Comments: Fine flesh colored papular rash bilateral arms, neck, around border of lips, and cheeks, nontender   Neurological:      Mental Status: She is alert. No results found for this visit on 06/27/22. Lab Review   Abstract on 06/23/2022   Component Date Value    PAP Smear, External 04/12/2021 . Orders Only on 03/11/2022   Component Date Value    Urine Culture, Routine 03/11/2022 <10,000 CFU/ml mixed skin/urogenital taryn.  No further workup     Sodium 03/11/2022 143     Potassium 03/11/2022 4.1     Chloride 03/11/2022 104     CO2 03/11/2022 26     Anion Gap 03/11/2022 13     Glucose 03/11/2022 94     BUN 03/11/2022 12     CREATININE 03/11/2022 1.3*    GFR Non- 03/11/2022 43*    GFR  03/11/2022 52*    Calcium 03/11/2022 9.4    Orders Only on 02/21/2022   Component Date Value    Sodium 02/21/2022 145     Potassium 02/21/2022 3.8     Chloride 02/21/2022 104     CO2 02/21/2022 29     Anion Gap 02/21/2022 12     Glucose 02/21/2022 98     BUN 02/21/2022 12     CREATININE 02/21/2022 1.2*    GFR Non- 02/21/2022 47*    GFR  02/21/2022 57*    Calcium 02/21/2022 10.1     Total Protein 02/21/2022 7.1     Albumin 02/21/2022 4.4     Albumin/Globulin Ratio 02/21/2022 1.6     Total Bilirubin 02/21/2022 <0.2     Alkaline Phosphatase 02/21/2022 92     ALT 02/21/2022 16     AST 02/21/2022 21     WBC 02/21/2022 5.0     RBC 02/21/2022 4.41     Hemoglobin 02/21/2022 12.9     Hematocrit 02/21/2022 38.4     MCV 02/21/2022 87.0     MCH 02/21/2022 29.3  MCHC 02/21/2022 33.7     RDW 02/21/2022 13.9     Platelets 15/96/4835 287     MPV 02/21/2022 8.5     Neutrophils % 02/21/2022 61.9     Lymphocytes % 02/21/2022 29.7     Monocytes % 02/21/2022 5.7     Eosinophils % 02/21/2022 1.9     Basophils % 02/21/2022 0.8     Neutrophils Absolute 02/21/2022 3.1     Lymphocytes Absolute 02/21/2022 1.5     Monocytes Absolute 02/21/2022 0.3     Eosinophils Absolute 02/21/2022 0.1     Basophils Absolute 02/21/2022 0.0    Office Visit on 02/21/2022   Component Date Value    Color, UA 02/21/2022 yellow     Clarity, UA 02/21/2022 clear     Glucose, UA POC 02/21/2022 neg     Bilirubin, UA 02/21/2022 neg     Ketones, UA 02/21/2022 neg     Spec Grav, UA 02/21/2022 >=1.030     Blood, UA POC 02/21/2022 trace     pH, UA 02/21/2022 6.5     Protein, UA POC 02/21/2022 neg     Urobilinogen, UA 02/21/2022 0.2     Leukocytes, UA 02/21/2022 neg     Nitrite, UA 02/21/2022 neg    Office Visit on 12/29/2021   Component Date Value    Cholesterol, Fasting 12/29/2021 282*    Triglyceride, Fasting 12/29/2021 102     HDL 12/29/2021 48     LDL Calculated 12/29/2021 214*    VLDL Cholesterol Calcula* 12/29/2021 20     Hemoglobin A1C 12/29/2021 6.0     eAG 12/29/2021 125.5          Assessment/Plan     1. Rash  - no improvement  -startmethylPREDNISolone (MEDROL DOSEPACK) 4 MG tablet; Take by mouth. Dispense: 1 kit; Refill: 0  -Referral to BLAKE Felder MD, Dermatology, [facility]    2. Itching  - no improvement  -start methylPREDNISolone (MEDROL DOSEPACK) 4 MG tablet; Take by mouth. Dispense: 1 kit; Refill: 0  - starthydrOXYzine HCl (ATARAX) 25 MG tablet; Take 1 tablet by mouth 3 times daily as needed for Itching  Dispense: 15 tablet; Refill: 0  -Referral to Mark Gipson MD, Dermatology, [facility]        Discussed medications with patient, who voiced understanding of their use and indications. All questions answered.       Return if symptoms worsen or fail to improve.

## 2022-06-27 NOTE — PATIENT INSTRUCTIONS
1. Rash  - no improvement  -startmethylPREDNISolone (MEDROL DOSEPACK) 4 MG tablet; Take by mouth. Dispense: 1 kit; Refill: 0  -Referral to BLAKE Saldaña MD, Dermatology, [facility]    2. Itching  - no improvement  -start methylPREDNISolone (MEDROL DOSEPACK) 4 MG tablet; Take by mouth. Dispense: 1 kit; Refill: 0  - starthydrOXYzine HCl (ATARAX) 25 MG tablet; Take 1 tablet by mouth 3 times daily as needed for Itching  Dispense: 15 tablet;  Refill: 0  -[facility]

## 2022-06-29 ASSESSMENT — ENCOUNTER SYMPTOMS
COUGH: 0
CONSTIPATION: 0
APNEA: 0
BACK PAIN: 0
CHEST TIGHTNESS: 0
EYE REDNESS: 0
ABDOMINAL DISTENTION: 0
FACIAL SWELLING: 0
EYE ITCHING: 0
ANAL BLEEDING: 0
ABDOMINAL PAIN: 0
VOMITING: 0
EYE PAIN: 0
SINUS PAIN: 0
VOICE CHANGE: 0
PHOTOPHOBIA: 0
NAUSEA: 0
CHOKING: 0
SORE THROAT: 0
WHEEZING: 0
EYE DISCHARGE: 0
TROUBLE SWALLOWING: 0
DIARRHEA: 0
BLOOD IN STOOL: 0
SHORTNESS OF BREATH: 0
SINUS PRESSURE: 0
RHINORRHEA: 0
RECTAL PAIN: 0

## 2022-06-30 ASSESSMENT — ENCOUNTER SYMPTOMS
FACIAL SWELLING: 1
COLOR CHANGE: 0

## 2022-07-06 ENCOUNTER — TELEPHONE (OUTPATIENT)
Dept: PRIMARY CARE CLINIC | Age: 52
End: 2022-07-06

## 2022-08-09 DIAGNOSIS — E78.2 MIXED HYPERLIPIDEMIA: ICD-10-CM

## 2022-08-09 DIAGNOSIS — Z11.59 NEED FOR HEPATITIS C SCREENING TEST: ICD-10-CM

## 2022-08-09 DIAGNOSIS — Z00.00 ANNUAL PHYSICAL EXAM: ICD-10-CM

## 2022-08-09 DIAGNOSIS — N18.30 STAGE 3 CHRONIC KIDNEY DISEASE, UNSPECIFIED WHETHER STAGE 3A OR 3B CKD (HCC): ICD-10-CM

## 2022-08-09 DIAGNOSIS — R73.03 PREDIABETES: ICD-10-CM

## 2022-08-09 LAB
A/G RATIO: 1.8 (ref 1.1–2.2)
ALBUMIN SERPL-MCNC: 4.5 G/DL (ref 3.4–5)
ALP BLD-CCNC: 102 U/L (ref 40–129)
ALT SERPL-CCNC: 26 U/L (ref 10–40)
ANION GAP SERPL CALCULATED.3IONS-SCNC: 14 MMOL/L (ref 3–16)
AST SERPL-CCNC: 29 U/L (ref 15–37)
BACTERIA: NORMAL /HPF
BASOPHILS ABSOLUTE: 0.1 K/UL (ref 0–0.2)
BASOPHILS RELATIVE PERCENT: 2.2 %
BILIRUB SERPL-MCNC: 0.5 MG/DL (ref 0–1)
BUN BLDV-MCNC: 10 MG/DL (ref 7–20)
CALCIUM SERPL-MCNC: 9.6 MG/DL (ref 8.3–10.6)
CHLORIDE BLD-SCNC: 103 MMOL/L (ref 99–110)
CHOLESTEROL, TOTAL: 206 MG/DL (ref 0–199)
CO2: 26 MMOL/L (ref 21–32)
CREAT SERPL-MCNC: 1.1 MG/DL (ref 0.6–1.1)
CREATININE URINE: 117 MG/DL (ref 28–259)
EOSINOPHILS ABSOLUTE: 0.1 K/UL (ref 0–0.6)
EOSINOPHILS RELATIVE PERCENT: 2 %
EPITHELIAL CELLS, UA: 2 /HPF (ref 0–5)
GFR AFRICAN AMERICAN: >60
GFR NON-AFRICAN AMERICAN: 52
GLUCOSE BLD-MCNC: 103 MG/DL (ref 70–99)
HCT VFR BLD CALC: 38.8 % (ref 36–48)
HDLC SERPL-MCNC: 47 MG/DL (ref 40–60)
HEMOGLOBIN: 13.1 G/DL (ref 12–16)
HEPATITIS C ANTIBODY INTERPRETATION: NORMAL
HYALINE CASTS: 0 /LPF (ref 0–8)
LDL CHOLESTEROL CALCULATED: 144 MG/DL
LYMPHOCYTES ABSOLUTE: 1.2 K/UL (ref 1–5.1)
LYMPHOCYTES RELATIVE PERCENT: 30.6 %
MCH RBC QN AUTO: 28.9 PG (ref 26–34)
MCHC RBC AUTO-ENTMCNC: 33.7 G/DL (ref 31–36)
MCV RBC AUTO: 85.7 FL (ref 80–100)
MICROALBUMIN UR-MCNC: <1.2 MG/DL
MICROALBUMIN/CREAT UR-RTO: NORMAL MG/G (ref 0–30)
MONOCYTES ABSOLUTE: 0.2 K/UL (ref 0–1.3)
MONOCYTES RELATIVE PERCENT: 5.6 %
NEUTROPHILS ABSOLUTE: 2.3 K/UL (ref 1.7–7.7)
NEUTROPHILS RELATIVE PERCENT: 59.6 %
PDW BLD-RTO: 14.9 % (ref 12.4–15.4)
PLATELET # BLD: 258 K/UL (ref 135–450)
PMV BLD AUTO: 8.8 FL (ref 5–10.5)
POTASSIUM SERPL-SCNC: 4.8 MMOL/L (ref 3.5–5.1)
RBC # BLD: 4.52 M/UL (ref 4–5.2)
RBC UA: 1 /HPF (ref 0–4)
SODIUM BLD-SCNC: 143 MMOL/L (ref 136–145)
TOTAL PROTEIN: 7 G/DL (ref 6.4–8.2)
TRIGL SERPL-MCNC: 73 MG/DL (ref 0–150)
TSH SERPL DL<=0.05 MIU/L-ACNC: 1.58 UIU/ML (ref 0.27–4.2)
URINE TYPE: NORMAL
VLDLC SERPL CALC-MCNC: 15 MG/DL
WBC # BLD: 3.9 K/UL (ref 4–11)
WBC UA: 1 /HPF (ref 0–5)

## 2022-08-10 LAB
ESTIMATED AVERAGE GLUCOSE: 128.4 MG/DL
HBA1C MFR BLD: 6.1 %

## 2022-08-12 LAB
KAPPA, FREE LIGHT CHAINS, SERUM: 15.82 MG/L (ref 3.3–19.4)
KAPPA/LAMBDA RATIO: 1.17 (ref 0.26–1.65)
KAPPA/LAMBDA TEST COMMENT: NORMAL
LAMBDA, FREE LIGHT CHAINS, SERUM: 13.51 MG/L (ref 5.71–26.3)

## 2022-08-28 DIAGNOSIS — E78.2 MIXED HYPERLIPIDEMIA: ICD-10-CM

## 2022-08-29 DIAGNOSIS — E78.2 MIXED HYPERLIPIDEMIA: ICD-10-CM

## 2022-08-29 RX ORDER — ROSUVASTATIN CALCIUM 20 MG/1
20 TABLET, COATED ORAL DAILY
Qty: 30 TABLET | Refills: 0 | OUTPATIENT
Start: 2022-08-29

## 2022-08-29 NOTE — TELEPHONE ENCOUNTER
Medication:   Requested Prescriptions     Pending Prescriptions Disp Refills    rosuvastatin (CRESTOR) 20 MG tablet [Pharmacy Med Name: ROSUVASTATIN 20MG TABLETS] 30 tablet 0     Sig: TAKE 1 TABLET BY MOUTH DAILY     Last Filled:  6.21.22    Last appt: 6/27/2022   Next appt: 8/29/2022    Last Lipid:   Lab Results   Component Value Date/Time    CHOL 206 08/09/2022 08:35 AM    TRIG 73 08/09/2022 08:35 AM    HDL 47 08/09/2022 08:35 AM    LDLCALC 144 08/09/2022 08:35 AM

## 2022-08-30 RX ORDER — ROSUVASTATIN CALCIUM 20 MG/1
20 TABLET, COATED ORAL DAILY
Qty: 30 TABLET | Refills: 0 | Status: SHIPPED | OUTPATIENT
Start: 2022-08-30

## 2022-10-05 DIAGNOSIS — E78.2 MIXED HYPERLIPIDEMIA: ICD-10-CM

## 2022-10-06 NOTE — TELEPHONE ENCOUNTER
Medication:   Requested Prescriptions     Pending Prescriptions Disp Refills    rosuvastatin (CRESTOR) 20 MG tablet 30 tablet 0     Sig: Take 1 tablet by mouth daily     Last Filled:  8.30.22    Last appt: 6/21/2022   Next appt: Visit date not found    Last Lipid:   Lab Results   Component Value Date/Time    CHOL 206 08/09/2022 08:35 AM    TRIG 73 08/09/2022 08:35 AM    HDL 47 08/09/2022 08:35 AM    LDLCALC 144 08/09/2022 08:35 AM

## 2022-10-08 RX ORDER — ROSUVASTATIN CALCIUM 20 MG/1
20 TABLET, COATED ORAL DAILY
Qty: 90 TABLET | Refills: 1 | OUTPATIENT
Start: 2022-10-08

## 2022-10-08 RX ORDER — ROSUVASTATIN CALCIUM 40 MG/1
40 TABLET, COATED ORAL DAILY
Qty: 30 TABLET | Refills: 5 | Status: SHIPPED | OUTPATIENT
Start: 2022-10-08

## 2022-10-08 NOTE — TELEPHONE ENCOUNTER
Increased dose of Rosuvastatin to 40mg once daily based on last labs. Left voicemail to inform patient.

## 2023-01-02 DIAGNOSIS — E78.2 MIXED HYPERLIPIDEMIA: ICD-10-CM

## 2023-01-03 RX ORDER — ROSUVASTATIN CALCIUM 20 MG/1
20 TABLET, COATED ORAL DAILY
Qty: 30 TABLET | Refills: 2 | Status: SHIPPED | OUTPATIENT
Start: 2023-01-03

## 2023-01-03 NOTE — TELEPHONE ENCOUNTER
Medication:   Requested Prescriptions     Pending Prescriptions Disp Refills    rosuvastatin (CRESTOR) 20 MG tablet 30 tablet 0     Sig: Take 1 tablet by mouth daily     Last Filled:  8/30/22    Last appt: 6/21/2022   Next appt: Visit date not found    Last Lipid:   Lab Results   Component Value Date/Time    CHOL 206 08/09/2022 08:35 AM    TRIG 73 08/09/2022 08:35 AM    HDL 47 08/09/2022 08:35 AM    LDLCALC 144 08/09/2022 08:35 AM

## 2023-05-18 ENCOUNTER — HOSPITAL ENCOUNTER (OUTPATIENT)
Dept: GENERAL RADIOLOGY | Age: 53
Discharge: HOME | End: 2023-05-18
Payer: COMMERCIAL

## 2023-05-18 ENCOUNTER — OFFICE VISIT (OUTPATIENT)
Dept: PRIMARY CARE CLINIC | Age: 53
End: 2023-05-18
Payer: COMMERCIAL

## 2023-05-18 VITALS
HEART RATE: 77 BPM | OXYGEN SATURATION: 98 % | BODY MASS INDEX: 32.31 KG/M2 | DIASTOLIC BLOOD PRESSURE: 80 MMHG | WEIGHT: 171 LBS | SYSTOLIC BLOOD PRESSURE: 108 MMHG

## 2023-05-18 DIAGNOSIS — L29.9 ITCHING: ICD-10-CM

## 2023-05-18 DIAGNOSIS — R73.03 PREDIABETES: ICD-10-CM

## 2023-05-18 DIAGNOSIS — N18.31 STAGE 3A CHRONIC KIDNEY DISEASE (HCC): ICD-10-CM

## 2023-05-18 DIAGNOSIS — R51.9 NONINTRACTABLE HEADACHE, UNSPECIFIED CHRONICITY PATTERN, UNSPECIFIED HEADACHE TYPE: ICD-10-CM

## 2023-05-18 DIAGNOSIS — E78.2 MIXED HYPERLIPIDEMIA: Primary | ICD-10-CM

## 2023-05-18 DIAGNOSIS — M54.2 NECK PAIN: ICD-10-CM

## 2023-05-18 DIAGNOSIS — R21 RASH: ICD-10-CM

## 2023-05-18 PROCEDURE — 72040 X-RAY EXAM NECK SPINE 2-3 VW: CPT

## 2023-05-18 PROCEDURE — 99214 OFFICE O/P EST MOD 30 MIN: CPT | Performed by: INTERNAL MEDICINE

## 2023-05-18 RX ORDER — ROSUVASTATIN CALCIUM 20 MG/1
20 TABLET, COATED ORAL DAILY
Qty: 90 TABLET | Refills: 1 | Status: SHIPPED | OUTPATIENT
Start: 2023-05-18

## 2023-05-18 RX ORDER — TRIAMCINOLONE ACETONIDE 1 MG/G
CREAM TOPICAL
Qty: 30 G | Refills: 2 | Status: SHIPPED | OUTPATIENT
Start: 2023-05-18 | End: 2023-05-28 | Stop reason: SDUPTHER

## 2023-05-18 RX ORDER — METHOCARBAMOL 500 MG/1
500 TABLET, FILM COATED ORAL 2 TIMES DAILY PRN
Qty: 30 TABLET | Refills: 0 | Status: SHIPPED | OUTPATIENT
Start: 2023-05-18

## 2023-05-18 SDOH — ECONOMIC STABILITY: INCOME INSECURITY: HOW HARD IS IT FOR YOU TO PAY FOR THE VERY BASICS LIKE FOOD, HOUSING, MEDICAL CARE, AND HEATING?: NOT HARD AT ALL

## 2023-05-18 SDOH — ECONOMIC STABILITY: FOOD INSECURITY: WITHIN THE PAST 12 MONTHS, YOU WORRIED THAT YOUR FOOD WOULD RUN OUT BEFORE YOU GOT MONEY TO BUY MORE.: NEVER TRUE

## 2023-05-18 SDOH — ECONOMIC STABILITY: HOUSING INSECURITY
IN THE LAST 12 MONTHS, WAS THERE A TIME WHEN YOU DID NOT HAVE A STEADY PLACE TO SLEEP OR SLEPT IN A SHELTER (INCLUDING NOW)?: NO

## 2023-05-18 SDOH — ECONOMIC STABILITY: FOOD INSECURITY: WITHIN THE PAST 12 MONTHS, THE FOOD YOU BOUGHT JUST DIDN'T LAST AND YOU DIDN'T HAVE MONEY TO GET MORE.: NEVER TRUE

## 2023-05-18 ASSESSMENT — PATIENT HEALTH QUESTIONNAIRE - PHQ9
4. FEELING TIRED OR HAVING LITTLE ENERGY: 0
SUM OF ALL RESPONSES TO PHQ9 QUESTIONS 1 & 2: 0
SUM OF ALL RESPONSES TO PHQ QUESTIONS 1-9: 0
10. IF YOU CHECKED OFF ANY PROBLEMS, HOW DIFFICULT HAVE THESE PROBLEMS MADE IT FOR YOU TO DO YOUR WORK, TAKE CARE OF THINGS AT HOME, OR GET ALONG WITH OTHER PEOPLE: 0
3. TROUBLE FALLING OR STAYING ASLEEP: 0
2. FEELING DOWN, DEPRESSED OR HOPELESS: 0
7. TROUBLE CONCENTRATING ON THINGS, SUCH AS READING THE NEWSPAPER OR WATCHING TELEVISION: 0
6. FEELING BAD ABOUT YOURSELF - OR THAT YOU ARE A FAILURE OR HAVE LET YOURSELF OR YOUR FAMILY DOWN: 0
5. POOR APPETITE OR OVEREATING: 0
SUM OF ALL RESPONSES TO PHQ QUESTIONS 1-9: 0
8. MOVING OR SPEAKING SO SLOWLY THAT OTHER PEOPLE COULD HAVE NOTICED. OR THE OPPOSITE, BEING SO FIGETY OR RESTLESS THAT YOU HAVE BEEN MOVING AROUND A LOT MORE THAN USUAL: 0
SUM OF ALL RESPONSES TO PHQ QUESTIONS 1-9: 0
1. LITTLE INTEREST OR PLEASURE IN DOING THINGS: 0
SUM OF ALL RESPONSES TO PHQ QUESTIONS 1-9: 0
9. THOUGHTS THAT YOU WOULD BE BETTER OFF DEAD, OR OF HURTING YOURSELF: 0

## 2023-05-26 ENCOUNTER — E-VISIT (OUTPATIENT)
Dept: PRIMARY CARE CLINIC | Age: 53
End: 2023-05-26
Payer: COMMERCIAL

## 2023-05-26 DIAGNOSIS — R21 RASH: Primary | ICD-10-CM

## 2023-05-26 DIAGNOSIS — L29.9 ITCHING: ICD-10-CM

## 2023-05-26 PROCEDURE — 99422 OL DIG E/M SVC 11-20 MIN: CPT | Performed by: NURSE PRACTITIONER

## 2023-05-27 PROBLEM — R51.9 NONINTRACTABLE HEADACHE: Status: ACTIVE | Noted: 2023-05-27

## 2023-05-27 PROBLEM — M54.2 NECK PAIN: Status: ACTIVE | Noted: 2023-05-27

## 2023-05-27 ASSESSMENT — ENCOUNTER SYMPTOMS
SORE THROAT: 0
CHEST TIGHTNESS: 0
NAUSEA: 0
SHORTNESS OF BREATH: 0
BLOOD IN STOOL: 0
COUGH: 0
CONSTIPATION: 0
SINUS PAIN: 0
SINUS PRESSURE: 0
VOMITING: 0
RHINORRHEA: 0
WHEEZING: 0
DIARRHEA: 0
ABDOMINAL PAIN: 0

## 2023-05-28 RX ORDER — TRIAMCINOLONE ACETONIDE 1 MG/G
CREAM TOPICAL
Qty: 30 G | Refills: 2 | Status: SHIPPED | OUTPATIENT
Start: 2023-05-28

## 2023-05-29 RX ORDER — PREDNISONE 10 MG/1
TABLET ORAL
Qty: 20 TABLET | Refills: 0 | Status: SHIPPED | OUTPATIENT
Start: 2023-05-29 | End: 2023-06-08

## 2023-05-29 RX ORDER — PREDNISONE 10 MG/1
TABLET ORAL
Qty: 20 TABLET | Refills: 0 | Status: SHIPPED | OUTPATIENT
Start: 2023-05-29 | End: 2023-05-29

## 2023-05-29 RX ORDER — HYDROXYZINE HYDROCHLORIDE 25 MG/1
25 TABLET, FILM COATED ORAL EVERY 8 HOURS PRN
Qty: 30 TABLET | Refills: 0 | Status: SHIPPED | OUTPATIENT
Start: 2023-05-29 | End: 2023-06-08

## 2023-05-29 RX ORDER — HYDROXYZINE HYDROCHLORIDE 25 MG/1
25 TABLET, FILM COATED ORAL EVERY 8 HOURS PRN
Qty: 30 TABLET | Refills: 0 | Status: SHIPPED | OUTPATIENT
Start: 2023-05-29 | End: 2023-05-29

## 2023-05-29 NOTE — PROGRESS NOTES
Reviewed questionnaire     Reviewed meds/allergies    Dx Dermatitis    Plan Rx given for kenalog, follow up with PCP if no improvement    States steroid cream offers no improvement.  Rx given for prednisone and zyrtec, follow up with PCP if no improvement    Time spent on visit 11 min

## 2023-07-06 ENCOUNTER — OFFICE VISIT (OUTPATIENT)
Dept: ENT CLINIC | Age: 53
End: 2023-07-06
Payer: COMMERCIAL

## 2023-07-06 VITALS
BODY MASS INDEX: 32.85 KG/M2 | WEIGHT: 174 LBS | SYSTOLIC BLOOD PRESSURE: 113 MMHG | OXYGEN SATURATION: 96 % | HEART RATE: 72 BPM | HEIGHT: 61 IN | RESPIRATION RATE: 16 BRPM | DIASTOLIC BLOOD PRESSURE: 78 MMHG

## 2023-07-06 DIAGNOSIS — H91.91 HEARING LOSS OF RIGHT EAR, UNSPECIFIED HEARING LOSS TYPE: Primary | ICD-10-CM

## 2023-07-06 DIAGNOSIS — H69.81 DYSFUNCTION OF RIGHT EUSTACHIAN TUBE: ICD-10-CM

## 2023-07-06 DIAGNOSIS — H91.90 HEARING LOSS, UNSPECIFIED HEARING LOSS TYPE, UNSPECIFIED LATERALITY: Primary | ICD-10-CM

## 2023-07-06 PROCEDURE — 99203 OFFICE O/P NEW LOW 30 MIN: CPT | Performed by: OTOLARYNGOLOGY

## 2023-07-06 NOTE — PROGRESS NOTES
[address]      Patient Name: Sara Nunn  Medical Record Number:  [id number]  Primary Care Physician:  Jesi Leonard MD  Date of Consultation: 7/6/2023    Chief Complaint: Right ear issues        HISTORY OF PRESENT ILLNESS  SALLY Mora is a(n) 47 y.o. female who presents for evaluation of right ear issues. The patient says first for several months she has felt as though she has not heard well on the right side. She said she does not remember being sick or anything when it started. She does not have a history of ear problems. Is just the left side. She says she does have allergies, but they have not been that bad this season. She has never had ear tubes or other ear problems. REVIEW OF SYSTEMS  As above    PHYSICAL EXAM  GENERAL: No Acute Distress, Alert and Oriented, no Hoarseness, strong voice  EYES: EOMI, Anti-icteric  HENT:   Head: Normocephalic and atraumatic. Face:  Symmetric, facial nerve intact, no sinus tenderness  Ears: See below  Mouth/Oral Cavity:  normal lips, Uvula is midline, no mucosal lesions  Oropharynx/Larynx:  normal oropharynx,  Nose:Normal external nasal appearance. NECK: Normal range of motion, no thyromegaly, trachea is midline, no lymphadenopathy, no neck masses, no crepitus            PROCEDURE  Bilateral ear exam  Right ear was visualized binocular scope. Tympanic membrane was intact with an aerated middle ear. On the left side tympanic membrane was intact and aerated middle ear. Ritter was midline        ASSESSMENT/PLAN  1. Hearing loss of right ear, unspecified hearing loss type  The patient has subjective right-sided hearing loss in the setting of an essentially normal ear exam.  It has been months so certainly something like sudden sensorineural hearing loss could have started this way.   Unfortunately even if it was sudden sensorineural hearing loss and has been months and I do not think treating with

## 2023-08-16 ENCOUNTER — OFFICE VISIT (OUTPATIENT)
Dept: ENT CLINIC | Age: 53
End: 2023-08-16

## 2023-08-16 ENCOUNTER — PROCEDURE VISIT (OUTPATIENT)
Dept: AUDIOLOGY | Age: 53
End: 2023-08-16

## 2023-08-16 VITALS
TEMPERATURE: 97.5 F | HEIGHT: 61 IN | BODY MASS INDEX: 33.04 KG/M2 | DIASTOLIC BLOOD PRESSURE: 81 MMHG | WEIGHT: 175 LBS | SYSTOLIC BLOOD PRESSURE: 136 MMHG | RESPIRATION RATE: 16 BRPM | HEART RATE: 54 BPM | OXYGEN SATURATION: 100 %

## 2023-08-16 DIAGNOSIS — H92.01 RIGHT EAR PAIN: Primary | ICD-10-CM

## 2023-08-16 DIAGNOSIS — H90.3 SENSORINEURAL HEARING LOSS (SNHL) OF BOTH EARS: ICD-10-CM

## 2023-08-16 DIAGNOSIS — H93.11 TINNITUS OF RIGHT EAR: ICD-10-CM

## 2023-08-16 DIAGNOSIS — H91.8X9 ASYMMETRICAL HEARING LOSS: ICD-10-CM

## 2023-08-16 DIAGNOSIS — H91.21 SUDDEN RIGHT HEARING LOSS: ICD-10-CM

## 2023-08-16 DIAGNOSIS — H90.41 SENSORINEURAL HEARING LOSS (SNHL) OF RIGHT EAR WITH UNRESTRICTED HEARING OF LEFT EAR: Primary | ICD-10-CM

## 2023-08-16 PROCEDURE — 92557 COMPREHENSIVE HEARING TEST: CPT | Performed by: AUDIOLOGIST

## 2023-08-16 PROCEDURE — 99214 OFFICE O/P EST MOD 30 MIN: CPT | Performed by: OTOLARYNGOLOGY

## 2023-08-16 PROCEDURE — 92567 TYMPANOMETRY: CPT | Performed by: AUDIOLOGIST

## 2023-08-16 NOTE — PROGRESS NOTES
Lemuel Mai   1970, 47 y.o. female   [id number]       Referring Provider: Tod Temple MD  Referral Type: In an order in [address] [place] [address]    Reason for Visit: Evaluation of suspected change in hearing, tinnitus, or balance. ADULT AUDIOLOGIC EVALUATION      SALLY Carr is a 47 y.o. female seen today, 8/16/2023 , for an initial audiologic evaluation. Patient was seen by Tod Temple MD following today's evaluation. AUDIOLOGIC AND OTHER PERTINENT MEDICAL HISTORY:      SALLY Bryson Sergei noted aural fullness and tinnitus. Patient reports onset of right hearing loss over the past 2 - 3 months without improvement. She notes right aural fullness and intermittent tinnitus with this as well. SALLY Saleemdeirdre Mai denied otalgia, otorrhea, dizziness, imbalance, history of falls, history of significant noise exposure, history of head trauma, history of ear surgery, and family history of hearing loss. Date: 8/16/2023     IMPRESSIONS:      AD: Mild sloping to Moderate rising to Mild SNHL, Good WRS, Type A tymp  AS:Hearing WNL sloping to Mild SNHL, Excellent WRS, Type A tymp    Test results consistent with asymmetric Sensorineural hearing loss. Hearing loss significant enough to create hearing difficulty in some listening situations. Discussed hearing loss, tinnitus, and hearing aids with patient. Patient to follow medical recommendations per Tod Temple MD .    ASSESSMENT AND FINDINGS:     Otoscopy revealed: Clear ear canals bilaterally    RIGHT EAR:  Hearing Sensitivity: Mild to Moderate to Mild Sensorineural hearing loss  Speech Recognition Threshold: 45 dB HL  Word Recognition:Good (84%), based on NU-6 25-word list at 75m dBHL using recorded speech stimuli. Tympanometry: Normal peak pressure and compliance, Type A tympanogram, consistent with normal middle ear function.   Acoustic Reflexes: Ipsilateral: Could not maintain seal. Contralateral: Could not maintain seal.    LEFT EAR:  Hearing Sensitivity: Normal hearing

## 2023-08-16 NOTE — PROGRESS NOTES
[facility] SURGERY  Follow up      Patient Name: Eliud Ocampo  Medical Record Number:  [id number]  Primary Care Physician:  Xenia Lo MD  Date of Consultation: 8/16/2023    Chief Complaint: Right ear issues        Interval History    The patient is following up for her right ear issues. I saw her on July 6, 2023. She had an essentially normal ear exam so I was worried she either had a sudden hearing loss or just some eustachian tube dysfunction. She got an audiogram today. There is been no improvement or change in the right ear. REVIEW OF SYSTEMS  As above    PHYSICAL EXAM  GENERAL: No Acute Distress, Alert and Oriented, no Hoarseness, strong voice  EYES: EOMI, Anti-icteric  HENT:   Head: Normocephalic and atraumatic. Face:  Symmetric, facial nerve intact, no sinus tenderness  Right Ear: Normal external ear, normal external auditory canal, intact tympanic membrane with normal mobility and aerated middle ear  Left Ear: Normal external ear, normal external auditory canal, intact tympanic membrane with normal mobility and aerated middle ear    I reviewed the audiogram the patient had today. On the right side she has mild to moderate sensorineural hearing loss with a type a tympanogram.  On the left side she has normal sloping to mild sensorineural hearing loss. ASSESSMENT/PLAN  1. Sensorineural hearing loss (SNHL) of right ear with unrestricted hearing of left ear  This patient has right-sided sensorineural hearing loss with asymmetry in every frequency. Her history and exam is consistent with a previous idiopathic sudden sensorineural hearing loss. It has been months at this point I do not think that there is any utility in giving her high-dose steroids. I do think we should get an MRI just to make sure she does not have something like an acoustic neuroma which could present this way.   Otherwise I do think she would benefit from a hearing

## 2023-08-30 ENCOUNTER — HOSPITAL ENCOUNTER (OUTPATIENT)
Dept: MRI IMAGING | Age: 53
Discharge: HOME | End: 2023-08-30
Attending: OTOLARYNGOLOGY
Payer: COMMERCIAL

## 2023-08-30 DIAGNOSIS — H91.21 SUDDEN RIGHT HEARING LOSS: ICD-10-CM

## 2023-08-30 DIAGNOSIS — H91.8X9 ASYMMETRICAL HEARING LOSS: ICD-10-CM

## 2023-08-30 PROCEDURE — 70551 MRI BRAIN STEM W/O DYE: CPT | Performed by: OTOLARYNGOLOGY

## 2023-09-06 ENCOUNTER — TELEPHONE (OUTPATIENT)
Dept: ENT CLINIC | Age: 53
End: 2023-09-06

## 2023-09-07 ENCOUNTER — TELEPHONE (OUTPATIENT)
Dept: ENT CLINIC | Age: 53
End: 2023-09-07

## 2023-09-07 NOTE — TELEPHONE ENCOUNTER
I called the patient to let her know that her MRI did not show evidence of an acoustic neuroma. This would explain the unilateral hearing loss. I have recommended that she see a neurotologist, Dr. Santiago Barron, at the [facility] for an opinion. I explained to her that sometimes they simply follow these with serial MRIs.   Other times they do radiation or sometimes even removal.  Patient agrees and I will have my office place that referral.

## 2023-09-08 ENCOUNTER — TELEPHONE (OUTPATIENT)
Dept: ENT CLINIC | Age: 53
End: 2023-09-08

## 2023-09-08 DIAGNOSIS — D36.10 NEUROMA: Primary | ICD-10-CM

## 2023-09-08 DIAGNOSIS — D33.3 RIGHT-SIDED ACOUSTIC NEUROMA (HCC): ICD-10-CM

## 2023-09-08 NOTE — TELEPHONE ENCOUNTER
----- Message from Karen Sawant MD sent at 9/7/2023 10:09 AM EDT -----  Regarding: UC referral  I would like her referred to Dr. Marge Corcoran at [facility] for right side acoustic neuroma.     Thanks,  Marino Ramey

## 2023-09-08 NOTE — TELEPHONE ENCOUNTER
Placed the referral and called pt to give her the number and she already had appt scheduled with Dr Tracy Robin

## 2023-09-22 DIAGNOSIS — R73.03 PREDIABETES: ICD-10-CM

## 2023-09-22 DIAGNOSIS — E78.2 MIXED HYPERLIPIDEMIA: ICD-10-CM

## 2023-09-22 DIAGNOSIS — N18.31 STAGE 3A CHRONIC KIDNEY DISEASE (HCC): ICD-10-CM

## 2023-09-22 LAB
ALBUMIN SERPL-MCNC: 4.5 G/DL (ref 3.4–5)
ALBUMIN/GLOB SERPL: 2 {RATIO} (ref 1.1–2.2)
ALP SERPL-CCNC: 72 U/L (ref 40–129)
ALT SERPL-CCNC: 13 U/L (ref 10–40)
ANION GAP SERPL CALCULATED.3IONS-SCNC: 10 MMOL/L (ref 3–16)
AST SERPL-CCNC: 19 U/L (ref 15–37)
BILIRUB SERPL-MCNC: 0.5 MG/DL (ref 0–1)
BUN SERPL-MCNC: 9 MG/DL (ref 7–20)
CALCIUM SERPL-MCNC: 9.1 MG/DL (ref 8.3–10.6)
CHLORIDE SERPL-SCNC: 100 MMOL/L (ref 99–110)
CHOLEST SERPL-MCNC: 309 MG/DL (ref 0–199)
CO2 SERPL-SCNC: 28 MMOL/L (ref 21–32)
CREAT SERPL-MCNC: 1.2 MG/DL (ref 0.6–1.1)
GFR SERPLBLD CREATININE-BSD FMLA CKD-EPI: 54 ML/MIN/{1.73_M2}
GLUCOSE SERPL-MCNC: 95 MG/DL (ref 70–99)
HDLC SERPL-MCNC: 47 MG/DL (ref 40–60)
LDLC SERPL CALC-MCNC: 237 MG/DL
POTASSIUM SERPL-SCNC: 4.1 MMOL/L (ref 3.5–5.1)
PROT SERPL-MCNC: 6.8 G/DL (ref 6.4–8.2)
SODIUM SERPL-SCNC: 138 MMOL/L (ref 136–145)
TRIGL SERPL-MCNC: 124 MG/DL (ref 0–150)
VLDLC SERPL CALC-MCNC: 25 MG/DL

## 2023-09-23 LAB
EST. AVERAGE GLUCOSE BLD GHB EST-MCNC: 122.6 MG/DL
HBA1C MFR BLD: 5.9 %

## 2023-09-25 ENCOUNTER — OFFICE VISIT (OUTPATIENT)
Dept: PRIMARY CARE CLINIC | Age: 53
End: 2023-09-25
Payer: COMMERCIAL

## 2023-09-25 VITALS
HEART RATE: 72 BPM | DIASTOLIC BLOOD PRESSURE: 74 MMHG | HEIGHT: 61 IN | WEIGHT: 176 LBS | OXYGEN SATURATION: 100 % | BODY MASS INDEX: 33.23 KG/M2 | TEMPERATURE: 97.7 F | SYSTOLIC BLOOD PRESSURE: 128 MMHG | RESPIRATION RATE: 16 BRPM

## 2023-09-25 DIAGNOSIS — E78.2 MIXED HYPERLIPIDEMIA: ICD-10-CM

## 2023-09-25 DIAGNOSIS — Z00.00 ENCOUNTER FOR WELL ADULT EXAM WITHOUT ABNORMAL FINDINGS: Primary | ICD-10-CM

## 2023-09-25 DIAGNOSIS — Z00.00 ENCOUNTER FOR WELL ADULT EXAM WITHOUT ABNORMAL FINDINGS: ICD-10-CM

## 2023-09-25 DIAGNOSIS — Z12.31 ENCOUNTER FOR SCREENING MAMMOGRAM FOR BREAST CANCER: ICD-10-CM

## 2023-09-25 DIAGNOSIS — R73.03 PREDIABETES: ICD-10-CM

## 2023-09-25 DIAGNOSIS — M54.2 NECK PAIN: ICD-10-CM

## 2023-09-25 DIAGNOSIS — N18.31 STAGE 3A CHRONIC KIDNEY DISEASE (HCC): ICD-10-CM

## 2023-09-25 LAB
BILIRUBIN, POC: NORMAL
BLOOD URINE, POC: NORMAL
CLARITY, POC: CLEAR
COLOR, POC: YELLOW
GLUCOSE URINE, POC: NORMAL
KETONES, POC: NORMAL
LEUKOCYTE EST, POC: NORMAL
NITRITE, POC: NORMAL
PH, POC: 6.5
PROTEIN, POC: NORMAL
SPECIFIC GRAVITY, POC: 1.01
TSH SERPL DL<=0.005 MIU/L-ACNC: 1.28 UIU/ML (ref 0.27–4.2)
UROBILINOGEN, POC: 0.2

## 2023-09-25 PROCEDURE — 99396 PREV VISIT EST AGE 40-64: CPT | Performed by: INTERNAL MEDICINE

## 2023-09-25 PROCEDURE — 81002 URINALYSIS NONAUTO W/O SCOPE: CPT | Performed by: INTERNAL MEDICINE

## 2023-09-25 RX ORDER — ROSUVASTATIN CALCIUM 20 MG/1
20 TABLET, COATED ORAL DAILY
Qty: 90 TABLET | Refills: 1 | Status: SHIPPED | OUTPATIENT
Start: 2023-09-25

## 2023-09-25 RX ORDER — METHOCARBAMOL 500 MG/1
500 TABLET, FILM COATED ORAL 2 TIMES DAILY PRN
Qty: 60 TABLET | Refills: 3 | Status: SHIPPED | OUTPATIENT
Start: 2023-09-25

## 2023-09-25 NOTE — PROGRESS NOTES
high  -Patient does resumed rosuvastatin after labs  -Continue rosuvastatin (CRESTOR) 20 MG tablet; Take 1 tablet by mouth daily  Dispense: 90 tablet; Refill: 1  -Lipid Panel; Future in 3 months  -Low fat, low cholesterol diet  -Regular aerobic exercise    3. Prediabetes  -Hemoglobin A1c of 5.9%  -Low carbohydrate diet  -Regular aerobic exercise    4. Stage 3a chronic kidney disease (HCC)  -stable  -Avoid NSAID's such as otc Ibuprofen, Advil, Motrin, Naprosyn, and Aleve as well as prescription NSAID's    5. Neck pain  -chronic  -Cervical Spine xray shows mild straightening of the cervical spine with mild multilevel degenerative spondylosis, greatest at C5-C6   - Continue methocarbamol (ROBAXIN) 500 MG tablet; Take 1 tablet by mouth 2 times daily as needed (neck pain)  Dispense: 60 tablet; Refill: 3  -Continue Chiropractor    6. Encounter for screening mammogram for breast cancer  - [facility] DIMITRI DIGITAL SCREEN BILATERAL;  Future             Personalized Preventive Plan   Current Health Maintenance Status  Immunization History   Administered Date(s) Administered    COVID-19, PFIZER PURPLE top, DILUTE for use, (age 15 y+), 30mcg/0.3mL 01/28/2021, 02/20/2021, 12/08/2021        Health Maintenance   Topic Date Due    HIV screen  Never done    DTaP/Tdap/Td vaccine (1 - Tdap) Never done    Shingles vaccine (1 of 2) Never done    COVID-19 Vaccine (4 - Pfizer series) 02/02/2022    Hepatitis B vaccine (1 of 3 - 3-dose series) 09/25/2024 (Originally 1970)    Flu vaccine (1) 09/25/2024 (Originally 8/1/2023)    Cervical cancer screen  04/12/2024    Depression Monitoring  05/18/2024    Breast cancer screen  06/27/2024    Colorectal Cancer Screen  07/28/2024    A1C test (Diabetic or Prediabetic)  09/22/2024    Lipids  09/22/2024    GFR test (Diabetes, CKD 3-4, OR last GFR 15-59)  09/22/2024    Hepatitis C screen  Completed    Hepatitis A vaccine  Aged Out    Hib vaccine  Aged Out    Meningococcal (ACWY) vaccine  Aged Out

## 2023-10-10 ASSESSMENT — ENCOUNTER SYMPTOMS
WHEEZING: 0
COUGH: 0
ANAL BLEEDING: 0
ABDOMINAL DISTENTION: 0
SINUS PRESSURE: 0
DIARRHEA: 0
BLOOD IN STOOL: 0
EYE REDNESS: 0
EYE DISCHARGE: 0
ABDOMINAL PAIN: 0
PHOTOPHOBIA: 0
SINUS PAIN: 0
TROUBLE SWALLOWING: 0
RECTAL PAIN: 0
RHINORRHEA: 0
NAUSEA: 0
BACK PAIN: 0
EYE PAIN: 0
FACIAL SWELLING: 0
SHORTNESS OF BREATH: 0
EYE ITCHING: 0
CHEST TIGHTNESS: 0
SORE THROAT: 0
APNEA: 0
CONSTIPATION: 0
CHOKING: 0
VOICE CHANGE: 0
VOMITING: 0

## 2023-10-29 NOTE — PROGRESS NOTES
Preoperative Consultation    Name: SALLY Vallejo  YOB: 1970    This patient presents to the office today for a preoperative consultation at the request of surgeon, Juan Jose Cristina and Dr John Thomas, who plans on performing RIGHT CRANIOTOMY FOR TUMOR RESECTION RETROSIGMOID on November 14 2023 at [facility]. Planned anesthesia: General   Known anesthesia problems: None   Bleeding risk: No recent or remote history of abnormal bleeding  Personal or FH ofDVT/PE: No      PAT  Apt tomorrow at PAT for labs at [facility]. Will also have MRSA swab    ENT  Sudden hearing loss and dizziness and random headaches  Diagnosed with vestibular schwannoma    PREDIABETES  Last A1c 5.9    LIPIDS  Takes daily statin. CKD  Avoid NSAIDs  CKD stable.    Labs were completed 9/2023     Patient Active Problem List   Diagnosis    Depression    Back pain of lumbosacral region with sciatica    Family history of diabetes mellitus (DM)    History of palpitations    Acute renal failure (HCC)    Dysmenorrhea    Excessive or frequent menstruation    Nonspecific abnormal results of kidney function study    Other specified disorders of kidney and ureter    Shortness of breath    Undiagnosed cardiac murmurs    Right lower quadrant abdominal pain    Abdominal cramping    Kidney function test abnormal    Urinary frequency    Low back pain    Stage 3a chronic kidney disease (HCC)    Prediabetes    Mixed hyperlipidemia    Rash    Itching    Pain of right upper arm    Neck pain    Nonintractable headache    Vestibular schwannoma ([address])     Past Surgical History:   Procedure Laterality Date    BREAST BIOPSY      UTERINE FIBROID EMBOLIZATION  2012     Allergies   Allergen Reactions    Latex Hives    Fluconazole In Dextrose      Vaginal burning    Iodine     Tramadol Itching     Outpatient Medications Marked as Taking for the 11/1/23 encounter (Office Visit) with BESSIE Patrick CNP   Medication Sig Dispense Refill    rosuvastatin (CRESTOR) 20 MG tablet

## 2023-11-01 ENCOUNTER — OFFICE VISIT (OUTPATIENT)
Dept: PRIMARY CARE CLINIC | Age: 53
End: 2023-11-01
Payer: COMMERCIAL

## 2023-11-01 VITALS
SYSTOLIC BLOOD PRESSURE: 124 MMHG | HEART RATE: 78 BPM | OXYGEN SATURATION: 98 % | BODY MASS INDEX: 33.63 KG/M2 | WEIGHT: 178 LBS | TEMPERATURE: 97.8 F | DIASTOLIC BLOOD PRESSURE: 84 MMHG

## 2023-11-01 DIAGNOSIS — Z01.818 PREOP EXAMINATION: Primary | ICD-10-CM

## 2023-11-01 DIAGNOSIS — E78.2 MIXED HYPERLIPIDEMIA: ICD-10-CM

## 2023-11-01 DIAGNOSIS — R73.03 PREDIABETES: ICD-10-CM

## 2023-11-01 DIAGNOSIS — N18.31 STAGE 3A CHRONIC KIDNEY DISEASE (HCC): ICD-10-CM

## 2023-11-01 DIAGNOSIS — D33.3 VESTIBULAR SCHWANNOMA (HCC): ICD-10-CM

## 2023-11-01 PROCEDURE — 99214 OFFICE O/P EST MOD 30 MIN: CPT | Performed by: NURSE PRACTITIONER

## 2023-11-01 PROCEDURE — 93000 ELECTROCARDIOGRAM COMPLETE: CPT | Performed by: NURSE PRACTITIONER

## 2023-11-01 ASSESSMENT — ENCOUNTER SYMPTOMS
SHORTNESS OF BREATH: 0
CHEST TIGHTNESS: 0
ABDOMINAL PAIN: 0
WHEEZING: 0
COUGH: 0
DIARRHEA: 0
VOMITING: 0
NAUSEA: 0

## 2024-01-31 ENCOUNTER — HOSPITAL ENCOUNTER (OUTPATIENT)
Dept: WOMENS IMAGING | Age: 54
Discharge: HOME | End: 2024-01-31
Payer: COMMERCIAL

## 2024-01-31 VITALS — BODY MASS INDEX: 34.93 KG/M2 | WEIGHT: 185 LBS | HEIGHT: 61 IN

## 2024-01-31 DIAGNOSIS — Z12.31 ENCOUNTER FOR SCREENING MAMMOGRAM FOR BREAST CANCER: ICD-10-CM

## 2024-01-31 PROCEDURE — 77063 BREAST TOMOSYNTHESIS BI: CPT

## 2024-02-23 DIAGNOSIS — E78.2 MIXED HYPERLIPIDEMIA: ICD-10-CM

## 2024-02-23 LAB
CHOLEST SERPL-MCNC: 162 MG/DL (ref 0–199)
HDLC SERPL-MCNC: 41 MG/DL (ref 40–60)
LDLC SERPL CALC-MCNC: 99 MG/DL
TRIGL SERPL-MCNC: 112 MG/DL (ref 0–150)
VLDLC SERPL CALC-MCNC: 22 MG/DL

## 2024-03-26 DIAGNOSIS — E78.2 MIXED HYPERLIPIDEMIA: ICD-10-CM

## 2024-03-26 RX ORDER — ROSUVASTATIN CALCIUM 20 MG/1
20 TABLET, COATED ORAL DAILY
Qty: 90 TABLET | Refills: 0 | Status: SHIPPED | OUTPATIENT
Start: 2024-03-26

## 2024-03-26 NOTE — TELEPHONE ENCOUNTER
Medication:   Requested Prescriptions     Pending Prescriptions Disp Refills    rosuvastatin (CRESTOR) 20 MG tablet [Pharmacy Med Name: ROSUVASTATIN 20MG TABLETS] 90 tablet 1     Sig: TAKE 1 TABLET BY MOUTH DAILY     Last Filled:  9.25.23    Last appt: 11/1/2023   Next appt: Visit date not found    Last OARRS:       8/14/2017     3:31 PM   RX Monitoring   Attestation The Prescription Monitoring Report was requested today but not available.

## 2024-03-26 NOTE — TELEPHONE ENCOUNTER
Call patient to schedule appointment for hyperlipidemia, prediabetes, chronic kidney disease. She was last seen on 11/1/2023 and has no appointment scheduled.

## 2024-05-03 ENCOUNTER — OFFICE VISIT (OUTPATIENT)
Dept: PRIMARY CARE CLINIC | Age: 54
End: 2024-05-03
Payer: COMMERCIAL

## 2024-05-03 VITALS
TEMPERATURE: 97.1 F | DIASTOLIC BLOOD PRESSURE: 84 MMHG | OXYGEN SATURATION: 97 % | WEIGHT: 189 LBS | SYSTOLIC BLOOD PRESSURE: 120 MMHG | BODY MASS INDEX: 35.68 KG/M2 | RESPIRATION RATE: 16 BRPM | HEIGHT: 61 IN | HEART RATE: 100 BPM

## 2024-05-03 DIAGNOSIS — R20.2 PARESTHESIA AND PAIN OF BOTH UPPER EXTREMITIES: Primary | ICD-10-CM

## 2024-05-03 DIAGNOSIS — M79.601 PARESTHESIA AND PAIN OF BOTH UPPER EXTREMITIES: ICD-10-CM

## 2024-05-03 DIAGNOSIS — M79.602 PARESTHESIA AND PAIN OF BOTH UPPER EXTREMITIES: Primary | ICD-10-CM

## 2024-05-03 DIAGNOSIS — M79.602 PARESTHESIA AND PAIN OF BOTH UPPER EXTREMITIES: ICD-10-CM

## 2024-05-03 DIAGNOSIS — M79.601 PARESTHESIA AND PAIN OF BOTH UPPER EXTREMITIES: Primary | ICD-10-CM

## 2024-05-03 DIAGNOSIS — D36.11 SCHWANNOMA OF NERVE OF HEAD: ICD-10-CM

## 2024-05-03 DIAGNOSIS — R20.2 PARESTHESIA AND PAIN OF BOTH UPPER EXTREMITIES: ICD-10-CM

## 2024-05-03 LAB
25(OH)D3 SERPL-MCNC: 32.8 NG/ML
ALBUMIN SERPL-MCNC: 4.4 G/DL (ref 3.4–5)
ALBUMIN/GLOB SERPL: 1.8 {RATIO} (ref 1.1–2.2)
ALP SERPL-CCNC: 95 U/L (ref 40–129)
ALT SERPL-CCNC: 20 U/L (ref 10–40)
ANION GAP SERPL CALCULATED.3IONS-SCNC: 12 MMOL/L (ref 3–16)
AST SERPL-CCNC: 25 U/L (ref 15–37)
BASOPHILS # BLD: 0 K/UL (ref 0–0.2)
BASOPHILS NFR BLD: 1.1 %
BILIRUB SERPL-MCNC: <0.2 MG/DL (ref 0–1)
BUN SERPL-MCNC: 9 MG/DL (ref 7–20)
CALCIUM SERPL-MCNC: 9.8 MG/DL (ref 8.3–10.6)
CHLORIDE SERPL-SCNC: 104 MMOL/L (ref 99–110)
CO2 SERPL-SCNC: 26 MMOL/L (ref 21–32)
CREAT SERPL-MCNC: 1.1 MG/DL (ref 0.6–1.1)
DEPRECATED RDW RBC AUTO: 14.2 % (ref 12.4–15.4)
EOSINOPHIL # BLD: 0.1 K/UL (ref 0–0.6)
EOSINOPHIL NFR BLD: 3 %
FOLATE SERPL-MCNC: 9.35 NG/ML (ref 4.78–24.2)
GFR SERPLBLD CREATININE-BSD FMLA CKD-EPI: 60 ML/MIN/{1.73_M2}
GLUCOSE SERPL-MCNC: 85 MG/DL (ref 70–99)
HCT VFR BLD AUTO: 37.4 % (ref 36–48)
HGB BLD-MCNC: 12.8 G/DL (ref 12–16)
LYMPHOCYTES # BLD: 1.3 K/UL (ref 1–5.1)
LYMPHOCYTES NFR BLD: 29.9 %
MCH RBC QN AUTO: 29.5 PG (ref 26–34)
MCHC RBC AUTO-ENTMCNC: 34.3 G/DL (ref 31–36)
MCV RBC AUTO: 85.9 FL (ref 80–100)
MONOCYTES # BLD: 0.3 K/UL (ref 0–1.3)
MONOCYTES NFR BLD: 7.2 %
NEUTROPHILS # BLD: 2.6 K/UL (ref 1.7–7.7)
NEUTROPHILS NFR BLD: 58.8 %
PLATELET # BLD AUTO: 257 K/UL (ref 135–450)
PMV BLD AUTO: 9.3 FL (ref 5–10.5)
POTASSIUM SERPL-SCNC: 3.8 MMOL/L (ref 3.5–5.1)
PROT SERPL-MCNC: 6.8 G/DL (ref 6.4–8.2)
RBC # BLD AUTO: 4.35 M/UL (ref 4–5.2)
SODIUM SERPL-SCNC: 142 MMOL/L (ref 136–145)
TSH SERPL DL<=0.005 MIU/L-ACNC: 0.95 UIU/ML (ref 0.27–4.2)
VIT B12 SERPL-MCNC: 469 PG/ML (ref 211–911)
WBC # BLD AUTO: 4.4 K/UL (ref 4–11)

## 2024-05-03 PROCEDURE — 99214 OFFICE O/P EST MOD 30 MIN: CPT | Performed by: STUDENT IN AN ORGANIZED HEALTH CARE EDUCATION/TRAINING PROGRAM

## 2024-05-03 NOTE — PROGRESS NOTES
[facility]  [address]  [address]  [facility] [place] [address]  Dept: [phone]  Dept Fax: [phone]  Loc: [phone]      SALLY Mai is a 53 y.o. female who presents today for:  Chief Complaint   Patient presents with    Headache    Tingling     Numbness and tingling in both arms       HPI:   SALLY Mai is 53 y.o. presents today for acute visit.     History of vestibular schwannoma. S/p excision, XRT. In the last week, has had numbness/tingling in bilateral arms. No weakness. Also having posterior right sided headache over area of incision. No fevers. She called her neurosurgeons office who recommended PCP evaluation.     Per note: \"53 y.o. 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.5cm right facial nerve schwannoma (slight growth of 5 months from 0.9x1.1cm) associated with near complete right sided hearing loss, balance issues, tinnitus, s/p aborted resection on 11/14/23 when it was noted to be CN7 not CN8 neuroma.\"      Objective:     Vitals:    05/03/24 1308   BP: 120/84   Pulse: 100   Resp: 16   Temp: 97.1 °F (36.2 °C)   SpO2: 97%     Wt Readings from Last 3 Encounters:   05/03/24 85.7 kg (189 lb)   01/31/24 83.9 kg (185 lb)   11/01/23 80.7 kg (178 lb)       BP Readings from Last 3 Encounters:   05/03/24 120/84   11/01/23 124/84   09/25/23 128/74       Lab Results   Component Value Date    WBC 3.9 (L) 08/09/2022    HGB 13.1 08/09/2022    HCT 38.8 08/09/2022    MCV 85.7 08/09/2022     08/09/2022     Lab Results   Component Value Date     09/22/2023    K 4.1 09/22/2023     09/22/2023    CO2 28 09/22/2023    BUN 9 09/22/2023    CREATININE 1.2 (H) 09/22/2023    GLUCOSE 95 09/22/2023    CALCIUM 9.1 09/22/2023    PROT 7.6 02/03/2012    BILITOT 0.5 09/22/2023    ALKPHOS 72 09/22/2023    AST 19 09/22/2023    ALT 13 09/22/2023    LABGLOM 54 (A) 09/22/2023    GFRAA >60 08/09/2022    AGRATIO 2.0 09/22/2023    GLOB 2.7 12/12/2020     Lab Results   Component Value

## 2024-05-04 LAB
EST. AVERAGE GLUCOSE BLD GHB EST-MCNC: 119.8 MG/DL
HBA1C MFR BLD: 5.8 %

## 2024-05-06 DIAGNOSIS — R21 RASH: ICD-10-CM

## 2024-05-06 DIAGNOSIS — L30.9 DERMATITIS: ICD-10-CM

## 2024-05-06 DIAGNOSIS — L29.9 ITCHING: ICD-10-CM

## 2024-05-06 RX ORDER — TRIAMCINOLONE ACETONIDE 1 MG/G
CREAM TOPICAL
Qty: 30 G | Refills: 2 | OUTPATIENT
Start: 2024-05-06

## 2024-05-06 RX ORDER — CLOBETASOL PROPIONATE 0.5 MG/G
OINTMENT TOPICAL
Qty: 30 G | Refills: 1 | OUTPATIENT
Start: 2024-05-06

## 2024-05-06 NOTE — TELEPHONE ENCOUNTER
Medication:   Requested Prescriptions     Pending Prescriptions Disp Refills    clobetasol (TEMOVATE) 0.05 % ointment 30 g 1     Sig: Apply topically 2 times daily.    triamcinolone (KENALOG) 0.1 % cream 30 g 2     Sig: Apply topically 2 times daily.     Last Filled:  7.15.21   5.28.23    Last appt: 5/3/2024   Next appt: 5/28/2024    Last OARRS:       8/14/2017     3:31 PM   RX Monitoring   Attestation The Prescription Monitoring Report was requested today but not available.

## 2024-05-12 DIAGNOSIS — L29.9 ITCHING: ICD-10-CM

## 2024-05-12 DIAGNOSIS — R21 RASH: ICD-10-CM

## 2024-05-12 DIAGNOSIS — L30.9 DERMATITIS: ICD-10-CM

## 2024-05-13 ENCOUNTER — PATIENT MESSAGE (OUTPATIENT)
Dept: PRIMARY CARE CLINIC | Age: 54
End: 2024-05-13

## 2024-05-13 DIAGNOSIS — R21 RASH: ICD-10-CM

## 2024-05-13 DIAGNOSIS — L29.9 ITCHING: ICD-10-CM

## 2024-05-13 RX ORDER — CLOBETASOL PROPIONATE 0.5 MG/G
OINTMENT TOPICAL
Qty: 30 G | Refills: 1 | OUTPATIENT
Start: 2024-05-13

## 2024-05-13 RX ORDER — TRIAMCINOLONE ACETONIDE 1 MG/G
CREAM TOPICAL
Qty: 30 G | Refills: 2 | OUTPATIENT
Start: 2024-05-13

## 2024-05-13 RX ORDER — TRIAMCINOLONE ACETONIDE 1 MG/G
CREAM TOPICAL
Qty: 30 G | Refills: 0 | Status: SHIPPED | OUTPATIENT
Start: 2024-05-13

## 2024-05-13 NOTE — TELEPHONE ENCOUNTER
From: SALLY Mai  To: Dr. Janna Meek  Sent: 5/13/2024 7:41 AM EDT  Subject: Itching/,rash    Hi I need a refill of the cream that starts with a T. I have a rash that won't go away on my neck. I requested other cream that is being filled today. I'm not sure which one will be effective.    Thank you

## 2024-05-13 NOTE — TELEPHONE ENCOUNTER
Medication:   Requested Prescriptions     Pending Prescriptions Disp Refills    triamcinolone (KENALOG) 0.1 % cream 30 g 2     Sig: Apply topically 2 times daily.     Last Filled:  05/28/2023    Last appt: 5/3/2024   Next appt: 5/28/2024    Last OARRS:       8/14/2017     3:31 PM   RX Monitoring   Attestation The Prescription Monitoring Report was requested today but not available.

## 2024-05-25 SDOH — ECONOMIC STABILITY: FOOD INSECURITY: WITHIN THE PAST 12 MONTHS, YOU WORRIED THAT YOUR FOOD WOULD RUN OUT BEFORE YOU GOT MONEY TO BUY MORE.: NEVER TRUE

## 2024-05-25 SDOH — ECONOMIC STABILITY: FOOD INSECURITY: WITHIN THE PAST 12 MONTHS, THE FOOD YOU BOUGHT JUST DIDN'T LAST AND YOU DIDN'T HAVE MONEY TO GET MORE.: NEVER TRUE

## 2024-05-25 SDOH — ECONOMIC STABILITY: INCOME INSECURITY: HOW HARD IS IT FOR YOU TO PAY FOR THE VERY BASICS LIKE FOOD, HOUSING, MEDICAL CARE, AND HEATING?: NOT HARD AT ALL

## 2024-05-28 ENCOUNTER — OFFICE VISIT (OUTPATIENT)
Dept: PRIMARY CARE CLINIC | Age: 54
End: 2024-05-28
Payer: COMMERCIAL

## 2024-05-28 VITALS
WEIGHT: 189 LBS | OXYGEN SATURATION: 98 % | DIASTOLIC BLOOD PRESSURE: 82 MMHG | SYSTOLIC BLOOD PRESSURE: 134 MMHG | BODY MASS INDEX: 35.68 KG/M2 | HEART RATE: 82 BPM | HEIGHT: 61 IN

## 2024-05-28 DIAGNOSIS — N89.8 VAGINAL DRYNESS: ICD-10-CM

## 2024-05-28 DIAGNOSIS — N18.31 STAGE 3A CHRONIC KIDNEY DISEASE (HCC): ICD-10-CM

## 2024-05-28 DIAGNOSIS — R73.03 PREDIABETES: ICD-10-CM

## 2024-05-28 DIAGNOSIS — R20.2 NUMBNESS AND TINGLING OF BOTH UPPER EXTREMITIES: ICD-10-CM

## 2024-05-28 DIAGNOSIS — M79.672 PAIN OF BOTH HEELS: ICD-10-CM

## 2024-05-28 DIAGNOSIS — R10.2 VAGINAL PAIN: ICD-10-CM

## 2024-05-28 DIAGNOSIS — M79.671 PAIN OF BOTH HEELS: ICD-10-CM

## 2024-05-28 DIAGNOSIS — E78.2 MIXED HYPERLIPIDEMIA: Primary | ICD-10-CM

## 2024-05-28 DIAGNOSIS — R20.0 NUMBNESS AND TINGLING OF BOTH UPPER EXTREMITIES: ICD-10-CM

## 2024-05-28 PROCEDURE — 99214 OFFICE O/P EST MOD 30 MIN: CPT | Performed by: INTERNAL MEDICINE

## 2024-05-28 RX ORDER — ROSUVASTATIN CALCIUM 20 MG/1
20 TABLET, COATED ORAL DAILY
Qty: 90 TABLET | Refills: 1 | Status: SHIPPED | OUTPATIENT
Start: 2024-05-28

## 2024-05-28 SDOH — ECONOMIC STABILITY: FOOD INSECURITY: WITHIN THE PAST 12 MONTHS, THE FOOD YOU BOUGHT JUST DIDN'T LAST AND YOU DIDN'T HAVE MONEY TO GET MORE.: NEVER TRUE

## 2024-05-28 SDOH — ECONOMIC STABILITY: FOOD INSECURITY: WITHIN THE PAST 12 MONTHS, YOU WORRIED THAT YOUR FOOD WOULD RUN OUT BEFORE YOU GOT MONEY TO BUY MORE.: NEVER TRUE

## 2024-05-28 SDOH — ECONOMIC STABILITY: INCOME INSECURITY: HOW HARD IS IT FOR YOU TO PAY FOR THE VERY BASICS LIKE FOOD, HOUSING, MEDICAL CARE, AND HEATING?: NOT VERY HARD

## 2024-05-28 NOTE — PROGRESS NOTES
numbness. Negative for dizziness, tremors, syncope, light-headedness and headaches.   Psychiatric/Behavioral:  Negative for dysphoric mood and sleep disturbance. The patient is not nervous/anxious.        Allergies   Allergen Reactions    Latex Hives    Prednisone Other (See Comments)     Elevates BP and causes tremors    Fluconazole In Dextrose      Vaginal burning    Iodine     Tramadol Itching    Iodinated Contrast Media Itching       Outpatient Medications Marked as Taking for the 5/28/24 encounter (Office Visit) with Janna Meek MD   Medication Sig Dispense Refill    rosuvastatin (CRESTOR) 20 MG tablet Take 1 tablet by mouth daily 90 tablet 1    methocarbamol (ROBAXIN) 500 MG tablet Take 1 tablet by mouth 2 times daily as needed (neck pain) 60 tablet 3    clobetasol (TEMOVATE) 0.05 % ointment Apply topically 2 times daily. (Patient taking differently: as needed Apply topically 2 times daily.) 30 g 1       Social History     Tobacco Use    Smoking status: Never    Smokeless tobacco: Never   Substance Use Topics    Alcohol use: Yes     Alcohol/week: 0.0 standard drinks of alcohol     Comment: Occasionally    Drug use: No         OBJECTIVE:    Vitals:    05/28/24 1514   BP: 134/82   Pulse: 82   SpO2: 98%   Weight: 85.7 kg (189 lb)   Height: 1.549 m (5' 1\")     Body mass index is 35.71 kg/m².    Wt Readings from Last 3 Encounters:   05/28/24 85.7 kg (189 lb)   05/03/24 85.7 kg (189 lb)   01/31/24 83.9 kg (185 lb)       Physical Exam  Nursing note reviewed.   Constitutional:       General: She is not in acute distress.     Appearance: Normal appearance. She is well-developed.   HENT:      Head: Normocephalic and atraumatic.      Mouth/Throat:      Pharynx: Oropharynx is clear.   Eyes:      General: Lids are normal.      Extraocular Movements: Extraocular movements intact.      Conjunctiva/sclera: Conjunctivae normal.      Pupils: Pupils are equal, round, and reactive to light.   Neck:      Thyroid: No

## 2024-06-02 DIAGNOSIS — L30.9 DERMATITIS: ICD-10-CM

## 2024-06-03 NOTE — TELEPHONE ENCOUNTER
Recent Visits  Date Type Provider Dept   05/28/24 Office Visit Janna Meek, MD [facility]   05/03/24 Office Visit Farheen Gaines,  [facility]   11/01/23 Office Visit Mallory Rivero, APRN - CNP [facility]   09/25/23 Office Visit Janna Meek, MD [facility]   05/18/23 Office Visit Janna Meek, MD [facility]   Showing recent visits within past 540 days with a meds authorizing provider and meeting all other requirements  Future Appointments  No visits were found meeting these conditions.  Showing future appointments within next 150 days with a meds authorizing provider and meeting all other requirements     12/29/2021

## 2024-06-04 RX ORDER — CLOBETASOL PROPIONATE 0.5 MG/G
OINTMENT TOPICAL
Qty: 30 G | Refills: 1 | Status: SHIPPED | OUTPATIENT
Start: 2024-06-04

## 2024-06-05 ENCOUNTER — TELEMEDICINE (OUTPATIENT)
Dept: PRIMARY CARE CLINIC | Age: 54
End: 2024-06-05
Payer: COMMERCIAL

## 2024-06-05 DIAGNOSIS — R21 RASH: Primary | ICD-10-CM

## 2024-06-05 DIAGNOSIS — R10.9 ABDOMINAL CRAMPING: ICD-10-CM

## 2024-06-05 DIAGNOSIS — A09 TRAVELER'S DIARRHEA: ICD-10-CM

## 2024-06-05 PROBLEM — R10.2 VAGINAL PAIN: Status: ACTIVE | Noted: 2024-06-05

## 2024-06-05 PROBLEM — M79.671 PAIN OF BOTH HEELS: Status: ACTIVE | Noted: 2024-06-05

## 2024-06-05 PROBLEM — R20.2 NUMBNESS AND TINGLING OF BOTH UPPER EXTREMITIES: Status: ACTIVE | Noted: 2024-06-05

## 2024-06-05 PROBLEM — R20.0 NUMBNESS AND TINGLING OF BOTH UPPER EXTREMITIES: Status: ACTIVE | Noted: 2024-06-05

## 2024-06-05 PROBLEM — M79.672 PAIN OF BOTH HEELS: Status: ACTIVE | Noted: 2024-06-05

## 2024-06-05 PROBLEM — N89.8 VAGINAL DRYNESS: Status: ACTIVE | Noted: 2024-06-05

## 2024-06-05 PROCEDURE — 99213 OFFICE O/P EST LOW 20 MIN: CPT | Performed by: INTERNAL MEDICINE

## 2024-06-05 RX ORDER — DICYCLOMINE HYDROCHLORIDE 10 MG/1
10 CAPSULE ORAL 3 TIMES DAILY PRN
Qty: 15 CAPSULE | Refills: 0 | Status: SHIPPED | OUTPATIENT
Start: 2024-06-05

## 2024-06-05 RX ORDER — CIPROFLOXACIN 500 MG/1
500 TABLET, FILM COATED ORAL 2 TIMES DAILY
Qty: 6 TABLET | Refills: 0 | Status: SHIPPED | OUTPATIENT
Start: 2024-06-05 | End: 2024-06-08

## 2024-06-05 ASSESSMENT — ENCOUNTER SYMPTOMS
WHEEZING: 0
SHORTNESS OF BREATH: 0
SINUS PRESSURE: 0
VOMITING: 0
BLOOD IN STOOL: 0
DIARRHEA: 0
COUGH: 0
RHINORRHEA: 0
CHEST TIGHTNESS: 0
CONSTIPATION: 0
SORE THROAT: 0
ABDOMINAL PAIN: 0
SINUS PAIN: 0
NAUSEA: 0

## 2024-06-05 NOTE — PROGRESS NOTES
pictures of the rash by [employer].    Patient states she just returned from a vacation to [facility] on Monday 6/3/2024. Patient states she has had diarrhea consistently all day yesterday, all night, and today. Patient states she has abdominal cramping. Patient states she has to urgently have a BM. Patient denies nausea, vomiting, and fever. Patient states she is trying to drink water. Patient states the diarrhea has been consistent since she returned home.      Review of Systems   Constitutional:  Negative for appetite change, chills and fever.   Respiratory:  Negative for shortness of breath.    Cardiovascular:  Negative for chest pain.   Gastrointestinal:  Positive for abdominal pain (abdominal cramps) and diarrhea. Negative for abdominal distention, blood in stool, constipation, nausea and vomiting.   Genitourinary:  Negative for dysuria, frequency and hematuria.   Musculoskeletal:  Negative for back pain.   Skin:  Positive for rash.   Neurological:  Negative for dizziness.        Outpatient Medications Marked as Taking for the 6/5/24 encounter (Telemedicine) with Janna Meek MD   Medication Sig Dispense Refill    clobetasol (TEMOVATE) 0.05 % ointment Apply topically 2 times daily. 30 g 1    rosuvastatin (CRESTOR) 20 MG tablet Take 1 tablet by mouth daily 90 tablet 1    methocarbamol (ROBAXIN) 500 MG tablet Take 1 tablet by mouth 2 times daily as needed (neck pain) 60 tablet 3     Allergies   Allergen Reactions    Latex Hives    Prednisone Other (See Comments)     Elevates BP and causes tremors    Fluconazole In Dextrose      Vaginal burning    Iodine     Tramadol Itching    Iodinated Contrast Media Itching       Objective   Patient-Reported Vitals  No data recorded         6/4/2024     5:33 PM   Patient-Reported Vitals   Patient-Reported Weight 189   Patient-Reported Height 5'1      Physical Exam  [INSTRUCTIONS:  \"[x]\" Indicates a positive item  \"[]\" Indicates a negative item  -- DELETE ALL ITEMS NOT

## 2024-06-14 PROBLEM — A09 TRAVELER'S DIARRHEA: Status: ACTIVE | Noted: 2024-06-14

## 2024-06-14 ASSESSMENT — ENCOUNTER SYMPTOMS
NAUSEA: 0
BLOOD IN STOOL: 0
BACK PAIN: 0
SHORTNESS OF BREATH: 0
VOMITING: 0
DIARRHEA: 1
CONSTIPATION: 0
ABDOMINAL PAIN: 1
ABDOMINAL DISTENTION: 0

## 2024-07-08 DIAGNOSIS — E78.2 MIXED HYPERLIPIDEMIA: ICD-10-CM

## 2024-07-08 RX ORDER — ROSUVASTATIN CALCIUM 20 MG/1
20 TABLET, COATED ORAL DAILY
Qty: 90 TABLET | Refills: 0 | OUTPATIENT
Start: 2024-07-08

## 2024-07-08 NOTE — TELEPHONE ENCOUNTER
Medication:   Requested Prescriptions     Pending Prescriptions Disp Refills    rosuvastatin (CRESTOR) 20 MG tablet 90 tablet 1     Sig: Take 1 tablet by mouth daily     Last Filled:  5/28/21    Last appt: 6/5/2024   Next appt: Visit date not found    Last OARRS:       8/14/2017     3:31 PM   RX Monitoring   Attestation The Prescription Monitoring Report was requested today but not available.

## 2024-07-15 NOTE — PROGRESS NOTES
round raised patches on left forearm and bicep area.  No pustules or vesicles noted.  No tenderness.  No surrounding erythema.) present.          Neurological:      Mental Status: She is alert and oriented to person, place, and time.   Psychiatric:         Mood and Affect: Mood normal.         Behavior: Behavior normal.          Patient Active Problem List   Diagnosis    Depression    Back pain of lumbosacral region with sciatica    Family history of diabetes mellitus (DM)    History of palpitations    Dysmenorrhea    Excessive or frequent menstruation    Nonspecific abnormal results of kidney function study    Other specified disorders of kidney and ureter    Shortness of breath    Undiagnosed cardiac murmurs    Right lower quadrant abdominal pain    Abdominal cramping    Kidney function test abnormal    Urinary frequency    Low back pain    Stage 3a chronic kidney disease (HCC)    Prediabetes    Mixed hyperlipidemia    Rash    Itching    Pain of right upper arm    Neck pain    Nonintractable headache    Schwannoma of nerve of head    Paresthesia and pain of both upper extremities    Numbness and tingling of both upper extremities    Pain of both heels    Vaginal dryness    Vaginal pain    Traveler's diarrhea      Allergies   Allergen Reactions    Latex Hives    Prednisone Other (See Comments)     Elevates BP and causes tremors    Fluconazole In Dextrose      Vaginal burning    Iodine     Tramadol Itching    Iodinated Contrast Media Itching     Current Outpatient Medications on File Prior to Visit   Medication Sig Dispense Refill    dicyclomine (BENTYL) 10 MG capsule Take 1 capsule by mouth 3 times daily as needed (abdominal cramping) 15 capsule 0    clobetasol (TEMOVATE) 0.05 % ointment Apply topically 2 times daily. 30 g 1    rosuvastatin (CRESTOR) 20 MG tablet Take 1 tablet by mouth daily 90 tablet 1    triamcinolone (KENALOG) 0.1 % cream Apply topically 2 times daily. 30 g 0    methocarbamol (ROBAXIN) 500 MG

## 2024-07-16 ENCOUNTER — OFFICE VISIT (OUTPATIENT)
Dept: PRIMARY CARE CLINIC | Age: 54
End: 2024-07-16
Payer: COMMERCIAL

## 2024-07-16 VITALS
HEART RATE: 77 BPM | DIASTOLIC BLOOD PRESSURE: 82 MMHG | WEIGHT: 187 LBS | TEMPERATURE: 97.3 F | OXYGEN SATURATION: 98 % | RESPIRATION RATE: 16 BRPM | BODY MASS INDEX: 35.33 KG/M2 | SYSTOLIC BLOOD PRESSURE: 124 MMHG

## 2024-07-16 DIAGNOSIS — R21 RASH: Primary | ICD-10-CM

## 2024-07-16 PROCEDURE — 99213 OFFICE O/P EST LOW 20 MIN: CPT | Performed by: NURSE PRACTITIONER

## 2024-07-16 RX ORDER — CLOTRIMAZOLE AND BETAMETHASONE DIPROPIONATE 10; .64 MG/G; MG/G
CREAM TOPICAL
Qty: 45 G | Refills: 0 | Status: SHIPPED | OUTPATIENT
Start: 2024-07-16

## 2024-07-16 ASSESSMENT — ENCOUNTER SYMPTOMS
DIARRHEA: 0
SHORTNESS OF BREATH: 0
VOMITING: 0
COUGH: 0
WHEEZING: 0
NAUSEA: 0

## 2024-07-17 DIAGNOSIS — E78.2 MIXED HYPERLIPIDEMIA: ICD-10-CM

## 2024-07-17 RX ORDER — ROSUVASTATIN CALCIUM 20 MG/1
20 TABLET, COATED ORAL DAILY
Qty: 90 TABLET | Refills: 1 | Status: SHIPPED | OUTPATIENT
Start: 2024-07-17

## 2024-07-17 NOTE — TELEPHONE ENCOUNTER
Medication:   Requested Prescriptions     Pending Prescriptions Disp Refills    rosuvastatin (CRESTOR) 20 MG tablet 90 tablet 1     Sig: Take 1 tablet by mouth daily     Last Filled:  5/28/24    Last appt: 5/28/24  Next appt: Visit date not found    Last Lipid:   Lab Results   Component Value Date/Time    CHOL 162 02/23/2024 11:05 AM    TRIG 112 02/23/2024 11:05 AM    HDL 41 02/23/2024 11:05 AM

## 2024-09-11 DIAGNOSIS — Z12.11 COLON CANCER SCREENING: Primary | ICD-10-CM

## 2024-09-20 LAB — NONINV COLON CA DNA+OCC BLD SCRN STL QL: NEGATIVE

## 2024-10-29 ENCOUNTER — HOSPITAL ENCOUNTER (OUTPATIENT)
Dept: GENERAL RADIOLOGY | Age: 54
Discharge: HOME | End: 2024-10-29
Payer: COMMERCIAL

## 2024-10-29 ENCOUNTER — OFFICE VISIT (OUTPATIENT)
Dept: PRIMARY CARE CLINIC | Age: 54
End: 2024-10-29
Payer: COMMERCIAL

## 2024-10-29 VITALS
HEART RATE: 85 BPM | RESPIRATION RATE: 16 BRPM | DIASTOLIC BLOOD PRESSURE: 84 MMHG | TEMPERATURE: 96.9 F | OXYGEN SATURATION: 99 % | BODY MASS INDEX: 35.87 KG/M2 | WEIGHT: 190 LBS | HEIGHT: 61 IN | SYSTOLIC BLOOD PRESSURE: 120 MMHG

## 2024-10-29 DIAGNOSIS — M25.562 PAIN IN BOTH KNEES, UNSPECIFIED CHRONICITY: ICD-10-CM

## 2024-10-29 DIAGNOSIS — M25.561 PAIN IN BOTH KNEES, UNSPECIFIED CHRONICITY: Primary | ICD-10-CM

## 2024-10-29 DIAGNOSIS — M25.561 PAIN IN BOTH KNEES, UNSPECIFIED CHRONICITY: ICD-10-CM

## 2024-10-29 DIAGNOSIS — M25.562 PAIN IN BOTH KNEES, UNSPECIFIED CHRONICITY: Primary | ICD-10-CM

## 2024-10-29 DIAGNOSIS — E78.2 MIXED HYPERLIPIDEMIA: ICD-10-CM

## 2024-10-29 DIAGNOSIS — R73.03 PREDIABETES: ICD-10-CM

## 2024-10-29 DIAGNOSIS — N18.31 STAGE 3A CHRONIC KIDNEY DISEASE (HCC): ICD-10-CM

## 2024-10-29 PROCEDURE — 99214 OFFICE O/P EST MOD 30 MIN: CPT | Performed by: INTERNAL MEDICINE

## 2024-10-29 PROCEDURE — 73562 X-RAY EXAM OF KNEE 3: CPT

## 2024-10-29 SDOH — ECONOMIC STABILITY: FOOD INSECURITY: WITHIN THE PAST 12 MONTHS, YOU WORRIED THAT YOUR FOOD WOULD RUN OUT BEFORE YOU GOT MONEY TO BUY MORE.: NEVER TRUE

## 2024-10-29 SDOH — ECONOMIC STABILITY: FOOD INSECURITY: WITHIN THE PAST 12 MONTHS, THE FOOD YOU BOUGHT JUST DIDN'T LAST AND YOU DIDN'T HAVE MONEY TO GET MORE.: NEVER TRUE

## 2024-10-29 SDOH — ECONOMIC STABILITY: INCOME INSECURITY: HOW HARD IS IT FOR YOU TO PAY FOR THE VERY BASICS LIKE FOOD, HOUSING, MEDICAL CARE, AND HEATING?: NOT HARD AT ALL

## 2024-10-29 NOTE — PROGRESS NOTES
Date of Visit: 10/29/2024    SALLY Mai (:  1970) is a 54 y.o. female,  Established patient here for evaluation of the following chief complaint(s):  Knee Pain      ASSESSMENT/PLAN:    1. Pain in both knees, unspecified chronicity  Assessment & Plan:  -Not controlled  -Knee xrays  -Start Tylenol Arthritis every 8 hours as needed  -Referral to Orthopedics  Orders:  -     XR KNEE RIGHT (3 VIEWS); Future  -     XR KNEE LEFT (3 VIEWS); Future  -     [facility] - Jasvir Castro MD, Orthopedics and Sports Medicine (Knee; Shoulder), [facility]  2. Mixed hyperlipidemia  Assessment & Plan:  -stable  -Continue rosuvastatin 20 mg once daily  -Low fat, low cholesterol diet  -Regular aerobic exercise  Orders:  -     Lipid Panel; Future  -     Comprehensive Metabolic Panel; Future  3. Prediabetes  Assessment & Plan:  -stable  -Low carbohydrate diet  -Regular aerobic exercise   Orders:  -     Hemoglobin A1C; Future  4. Stage 3a chronic kidney disease (HCC)  Assessment & Plan:  -stable  -Avoid NSAIDs   Orders:  -     Comprehensive Metabolic Panel; Future      Return in about 3 months (around 2025) for annual physical exam.    SUBJECTIVE:    Patient complaints of knee pain that is sharp and noticeable with stairs for over a year. Patient states her knees buckle L>R. Patient denies swelling. Patient denies injury. Patient states her knees are very painful to touch on the back or sides of the knee. Patient is also due for follow-up on her chronic conditions. Patient has hyperlipidemia. Patient takes rosuvastatin 20 mg once daily. Patient decreases fat. Patient states she is trying to walk but not consistently. Patient has prediabetes. Patient decreases carbohydrates. Patient has chronic kidney disease. Patient avoids NSAIDs.        Review of Systems   Constitutional:  Negative for activity change, chills, fatigue, fever and unexpected weight change.   HENT:  Negative for congestion, postnasal drip, rhinorrhea,

## 2024-11-07 PROBLEM — M25.562 PAIN IN BOTH KNEES: Status: ACTIVE | Noted: 2024-11-07

## 2024-11-07 PROBLEM — M25.561 PAIN IN BOTH KNEES: Status: ACTIVE | Noted: 2024-11-07

## 2024-11-07 ASSESSMENT — ENCOUNTER SYMPTOMS
NAUSEA: 0
RHINORRHEA: 0
SINUS PRESSURE: 0
DIARRHEA: 0
ABDOMINAL PAIN: 0
VOMITING: 0
SORE THROAT: 0
CONSTIPATION: 0
SHORTNESS OF BREATH: 0
WHEEZING: 0
CHEST TIGHTNESS: 0
COUGH: 0
BLOOD IN STOOL: 0

## 2024-11-07 NOTE — ASSESSMENT & PLAN NOTE
-stable  -Continue rosuvastatin 20 mg once daily  -Low fat, low cholesterol diet  -Regular aerobic exercise

## 2024-11-07 NOTE — ASSESSMENT & PLAN NOTE
-Not controlled  -Knee xrays  -Start Tylenol Arthritis every 8 hours as needed  -Referral to Orthopedics

## 2024-11-16 LAB — PAP SMEAR, EXTERNAL: NEGATIVE

## 2024-11-17 SDOH — HEALTH STABILITY: PHYSICAL HEALTH: ON AVERAGE, HOW MANY MINUTES DO YOU ENGAGE IN EXERCISE AT THIS LEVEL?: 0 MIN

## 2024-11-17 SDOH — HEALTH STABILITY: PHYSICAL HEALTH: ON AVERAGE, HOW MANY DAYS PER WEEK DO YOU ENGAGE IN MODERATE TO STRENUOUS EXERCISE (LIKE A BRISK WALK)?: 0 DAYS

## 2024-11-20 ENCOUNTER — OFFICE VISIT (OUTPATIENT)
Dept: ORTHOPEDIC SURGERY | Age: 54
End: 2024-11-20

## 2024-11-20 DIAGNOSIS — M22.2X2 PATELLOFEMORAL ARTHRALGIA OF BOTH KNEES: Primary | ICD-10-CM

## 2024-11-20 DIAGNOSIS — M22.2X1 PATELLOFEMORAL ARTHRALGIA OF BOTH KNEES: Primary | ICD-10-CM

## 2024-11-20 NOTE — PROGRESS NOTES
Chief Complaint  Knee Pain (New patient bilateral knees )      History of Present Illness:  SALLY Mai is a pleasant 54 y.o. female who presents with complaints of bilateral knee pain, stating the left knee is worse than the right.  Patient has been experiencing bilateral knee pain mainly in the front and back of her knees for about 3 to 5 years.  There is no known injury that she knows of.  Patient's history is also significant for a brain tumor which she underwent tumor removal surgery approximately 1 year ago.  Patient states after her surgery she did have to go to physical therapy for her facial neurodeficits.  In regards to her knee she does complain of pain when going up and down stairs as well as some difficulty with uneven terrain and feels like there are\" rice crispy\" in her knee.  Patient has taken Tylenol arthritis for her knee which seems to help a little bit, she is unable to take anti-inflammatory medication due to a history of stage IIIa chronic kidney disease.       Medical History:  Patient's medications, allergies, past medical, surgical, social and family histories were reviewed and updated as appropriate.       ROS: Review of systems reviewed from Patient History Form completed today and available in the patient's chart under the Media tab.      Pertinent items are noted in HPI  Review of systems reviewed from Patient History Form completed today and available in the patient's chart under the Media tab.       Vital Signs:  There were no vitals taken for this visit.      right knee examination:    Gait: No use of assistive devices. No antalgic gait.    Alignment: normal alignment.    Inspection/skin: Skin is intact without erythema or ecchymosis. No gross deformity.     Palpation: mild crepitus to the patellofemoral joint.  There is some tenderness to palpation about the hamstring    Range of Motion: There is full range of motion.     Strength: Normal quadriceps development.     Effusion: No

## 2024-12-02 ENCOUNTER — HOSPITAL ENCOUNTER (OUTPATIENT)
Dept: PHYSICAL THERAPY | Age: 54
Setting detail: THERAPIES SERIES
Discharge: HOME | End: 2024-12-02
Payer: COMMERCIAL

## 2024-12-02 DIAGNOSIS — R29.898 BILATERAL LEG WEAKNESS: Primary | ICD-10-CM

## 2024-12-02 PROCEDURE — 97530 THERAPEUTIC ACTIVITIES: CPT

## 2024-12-02 PROCEDURE — 97161 PT EVAL LOW COMPLEX 20 MIN: CPT

## 2024-12-02 PROCEDURE — 97110 THERAPEUTIC EXERCISES: CPT

## 2024-12-02 NOTE — PLAN OF CARE
criteria necessary for medical necessity for care and/or justification of therapy services:  The patient has functional impairments and/or activity limitations and would benefit from continued outpatient therapy services to address the deficits outlined in the patients goals  The patient has associated co-morbidities along with primary diagnosis which significantly impact the rate of recovery and contribute to complexities that require skilled therapeutic intervention    Return to Play: NA    Prognosis for POC: [x] Good [] Fair  [] Poor    Patient requires continued skilled intervention: [x] Yes  [] No      CHARGE CAPTURE     PT CHARGE GRID   CPT Code (TIMED) minutes # CPT Code (UNTIMED) #     Therex (24479)  14 1  EVAL:LOW (92724 - Typically 20 minutes face-to-face) 1    Neuromusc. Re-ed (73492)    Re-Eval (41111)     Manual (24849)    Estim Unattended (23178)     Ther. Act (74918) 10 1  Mech. Traction (49804)     Gait (40320)    Dry Needle 1-2 muscle (29075)     Aquatic Therex (79157)    Dry Needle 3+ muscle (38708)     Iontophoresis (19696)    VASO (96243)     Ultrasound (11020)    Group Therapy (03303)     Estim Attended (23500)    Canalith Repositioning (98814)     Physical Performance Test (05319)    Custom orthotic ()     Other:    Other:    Total Timed Code Tx Minutes 24 2  1     Total Treatment Minutes 40        Charge Justification:  (18840) THERAPEUTIC EXERCISE - Provided verbal/tactile cueing for activities related to strengthening, flexibility, endurance, ROM performed to prevent loss of range of motion, maintain or improve muscular strength or increase flexibility, following either an injury or surgery.   (38231) HOME EXERCISE PROGRAM - Reviewed/Progressed HEP activities related to strengthening, flexibility, endurance, ROM performed to prevent loss of range of motion, maintain or improve muscular strength or increase flexibility, following either an injury or surgery.  (58032) THERAPEUTIC

## 2024-12-11 ENCOUNTER — HOSPITAL ENCOUNTER (OUTPATIENT)
Dept: PHYSICAL THERAPY | Age: 54
Setting detail: THERAPIES SERIES
Discharge: HOME | End: 2024-12-11
Payer: COMMERCIAL

## 2024-12-11 PROCEDURE — 97110 THERAPEUTIC EXERCISES: CPT

## 2024-12-11 NOTE — FLOWSHEET NOTE
[facility] - Outpatient Rehabilitation and Therapy [address], [place] [address] office: [phone] fax: [phone]       Physical Therapy: TREATMENT/PROGRESS NOTE   Patient: SALLY Mai (54 y.o. female)   Examination Date: 2024   :  1970 MRN: [id number]   Visit #: 2   Insurance Allowable Auth Needed   bmn []Yes    []No    Insurance: Pay[place]: [place] BCBS / Plan: [place] / Product Type: *No Product type* /   Insurance ID: [id number] - ([place] BCBS)  Secondary Insurance (if applicable):    Treatment Diagnosis:     ICD-10-CM    1. Bilateral leg weakness  R29.898          Medical Diagnosis:  Patellofemoral arthralgia of both knees [M22.2X1, M22.2X2]   Referring Physician: Jasvir Castro MD  PCP: Janna Meek MD     Plan of care signed (Y/N): y    Date of Patient follow up with Physician:      Plan of Care Report: NO  POC update due: (10 visits /OR AUTH LIMITS, whichever is less)  2025                                             Medical History:  Comorbidities:    Relevant Medical History: hx of brain tumor and craniotomy                                         Precautions/ Contra-indications:           Latex allergy:    Pacemaker:      Contraindications for Manipulation: NA  Date of Surgery:   Other:    Red Flags:  None    Suicide Screening:   The patient did not verbalize a primary behavioral concern, suicidal ideation, suicidal intent, or demonstrate suicidal behaviors.    Preferred Language for Healthcare:   [x] English       [] other:    SUBJECTIVE EXAMINATION     Patient stated complaint: Tylenol arthritis helps quite a bit. HEP going well.         Test used Initial score  2024   Pain Summary VAS 6-8 6   Functional questionnaire LEFS 34/80    Other:                OBJECTIVE EXAMINATION     24  ROM/Strength: (Blank cells denote NT)      Mvmt (norm) AROM L AROM R Notes PROM L PROM R Notes     LUMBAR Flex (90)         Ext (25)

## 2024-12-13 ENCOUNTER — HOSPITAL ENCOUNTER (OUTPATIENT)
Dept: PHYSICAL THERAPY | Age: 54
Setting detail: THERAPIES SERIES
Discharge: HOME | End: 2024-12-13
Payer: COMMERCIAL

## 2024-12-13 PROCEDURE — 97110 THERAPEUTIC EXERCISES: CPT

## 2024-12-13 NOTE — FLOWSHEET NOTE
[facility] - Outpatient Rehabilitation and Therapy [address], [place] [address] office: [phone] fax: [phone]       Physical Therapy: TREATMENT/PROGRESS NOTE   Patient: SALLY Mai (54 y.o. female)   Examination Date: 2024   :  1970 MRN: [id number]   Visit #: 3   Insurance Allowable Auth Needed   bmn []Yes    []No    Insurance: Pay[place] BC[place] / Plan: [place] / Product Type: *No Product type* /   Insurance ID: [id number] - ([place] BCBS)  Secondary Insurance (if applicable):    Treatment Diagnosis:     ICD-10-CM    1. Bilateral leg weakness  R29.898          Medical Diagnosis:  Patellofemoral arthralgia of both knees [M22.2X1, M22.2X2]   Referring Physician: Jasvir Castro MD  PCP: Janna Meek MD     Plan of care signed (Y/N): y    Date of Patient follow up with Physician:      Plan of Care Report: NO  POC update due: (10 visits /OR AUTH LIMITS, whichever is less)  2025                                             Medical History:  Comorbidities:    Relevant Medical History: hx of brain tumor and craniotomy                                         Precautions/ Contra-indications:           Latex allergy:    Pacemaker:      Contraindications for Manipulation: NA  Date of Surgery:   Other:    Red Flags:  None    Suicide Screening:   The patient did not verbalize a primary behavioral concern, suicidal ideation, suicidal intent, or demonstrate suicidal behaviors.    Preferred Language for Healthcare:   [x] English       [] other:    SUBJECTIVE EXAMINATION     Patient stated complaint: Was not too sore after last session.         Test used Initial score  2024   Pain Summary VAS 6-8 4   Functional questionnaire LEFS 34/80    Other:                OBJECTIVE EXAMINATION     24  ROM/Strength: (Blank cells denote NT)      Mvmt (norm) AROM L AROM R Notes PROM L PROM R Notes     LUMBAR Flex (90)         Ext (25)         SB (25)

## 2024-12-16 ENCOUNTER — HOSPITAL ENCOUNTER (OUTPATIENT)
Dept: PHYSICAL THERAPY | Age: 54
Setting detail: THERAPIES SERIES
Discharge: HOME | End: 2024-12-16
Payer: COMMERCIAL

## 2024-12-16 PROCEDURE — 97110 THERAPEUTIC EXERCISES: CPT

## 2024-12-16 NOTE — FLOWSHEET NOTE
[facility] - Outpatient Rehabilitation and Therapy [address], [place] [address] office: [phone] fax: [phone]       Physical Therapy: TREATMENT/PROGRESS NOTE   Patient: SALLY Mai (54 y.o. female)   Examination Date: 2024   :  1970 MRN: [id number]   Visit #: 4   Insurance Allowable Auth Needed   bmn []Yes    []No    Insurance: [place] / Plan: [place] / Product Type: *No Product type* /   Insurance ID: [id number] - ([place] BCBS)  Secondary Insurance (if applicable):    Treatment Diagnosis:     ICD-10-CM    1. Bilateral leg weakness  R29.898          Medical Diagnosis:  Patellofemoral arthralgia of both knees [M22.2X1, M22.2X2]   Referring Physician: Jasvir Castro MD  PCP: Janna Meek MD     Plan of care signed (Y/N): y    Date of Patient follow up with Physician:      Plan of Care Report: NO  POC update due: (10 visits /OR AUTH LIMITS, whichever is less)  2025                                             Medical History:  Comorbidities:    Relevant Medical History: hx of brain tumor and craniotomy                                         Precautions/ Contra-indications:           Latex allergy:    Pacemaker:      Contraindications for Manipulation: NA  Date of Surgery:   Other:    Red Flags:  None    Suicide Screening:   The patient did not verbalize a primary behavioral concern, suicidal ideation, suicidal intent, or demonstrate suicidal behaviors.    Preferred Language for Healthcare:   [x] English       [] other:    SUBJECTIVE EXAMINATION     Patient stated complaint: Patient reports she was just a little sore over the weekend but nothing major.         Test used Initial score  2024   Pain Summary VAS 6-8 4   Functional questionnaire LEFS 34/80    Other:                OBJECTIVE EXAMINATION   : 15 RM knee extension 35 pounds bilateral  24  ROM/Strength: (Blank cells denote NT)      Mvmt (norm) AROM L AROM

## 2024-12-18 ENCOUNTER — HOSPITAL ENCOUNTER (OUTPATIENT)
Dept: PHYSICAL THERAPY | Age: 54
Setting detail: THERAPIES SERIES
Discharge: HOME | End: 2024-12-18
Payer: COMMERCIAL

## 2024-12-18 PROCEDURE — 97110 THERAPEUTIC EXERCISES: CPT

## 2024-12-18 NOTE — FLOWSHEET NOTE
improvement in movement, function, and ADLs as indicated by Functional Deficits.  [] Progressing: [] Met: [] Not Met: [] Adjusted    Long Term Goals: To be achieved in: 6 weeks  1. Disability index score improvement of 10% or more for the LEFS to assist with return top prior level of function and indicate clinically relevant improvement in function. .  [] Progressing: [] Met: [] Not Met: [] Adjusted  2. Patient will be able to independently navigate 10 stairs ascending/descending without pain or difficulty to indicate improved functional mobility and return to prior level of function.   [] Progressing: [] Met: [] Not Met: [] Adjusted  3. Patient will be able to lift 20 pounds from floor to waist level without pain or difficulty to perform heavy household duties.   [] Progressing: [] Met: [] Not Met: [] Adjusted  4. Patient will have improvement in symptoms by 50% (per her reports) or more with daily activities.  [] Progressing: [] Met: [] Not Met: [] Adjusted    Overall Progression Towards Functional goals/ Treatment Progress Update:  [] Patient is progressing as expected towards functional goals listed.    [] Progression is slowed due to complexities/Impairments listed.  [] Progression has been slowed due to co-morbidities.  [x] Plan just implemented, too soon (<30days) to assess goals progression   [] Goals require adjustment due to lack of progress  [] Patient is not progressing as expected and requires additional follow up with physician  [] Other:     TREATMENT PLAN     Frequency/Duration: 2x/week for 6 weeks for the following treatment interventions:    Interventions:  Therapeutic Exercise (20789) including: strength training, ROM, and functional mobility  Therapeutic Activities (81052) including: functional mobility training and education.  Neuromuscular Re-education (90532) activation and proprioception, including postural re-education.    Gait Training (75726) for normalization of ambulation patterns and AD

## 2024-12-23 ENCOUNTER — HOSPITAL ENCOUNTER (OUTPATIENT)
Dept: PHYSICAL THERAPY | Age: 54
Setting detail: THERAPIES SERIES
Discharge: HOME | End: 2024-12-23
Payer: COMMERCIAL

## 2025-01-07 DIAGNOSIS — E78.2 MIXED HYPERLIPIDEMIA: ICD-10-CM

## 2025-01-07 RX ORDER — ROSUVASTATIN CALCIUM 20 MG/1
20 TABLET, COATED ORAL DAILY
Qty: 90 TABLET | Refills: 0 | Status: SHIPPED | OUTPATIENT
Start: 2025-01-07

## 2025-01-07 NOTE — TELEPHONE ENCOUNTER
Medication:   Requested Prescriptions     Pending Prescriptions Disp Refills    rosuvastatin (CRESTOR) 20 MG tablet [Pharmacy Med Name: ROSUVASTATIN 20MG TABLETS] 90 tablet 1     Sig: TAKE 1 TABLET BY MOUTH DAILY     Last Filled:  7/17/2024    Last appt: 10/29/2024   Next appt: Visit date not found    Last Lipid:   Lab Results   Component Value Date/Time    CHOL 162 02/23/2024 11:05 AM    TRIG 112 02/23/2024 11:05 AM    HDL 41 02/23/2024 11:05 AM

## 2025-01-13 ENCOUNTER — PROCEDURE VISIT (OUTPATIENT)
Dept: AUDIOLOGY | Age: 55
End: 2025-01-13

## 2025-01-13 DIAGNOSIS — H90.3 ASYMMETRIC SNHL (SENSORINEURAL HEARING LOSS): ICD-10-CM

## 2025-01-13 DIAGNOSIS — H90.3 SENSORINEURAL HEARING LOSS (SNHL) OF BOTH EARS: Primary | ICD-10-CM

## 2025-01-13 PROCEDURE — V5299 HEARING SERVICE: HCPCS

## 2025-01-13 NOTE — PROGRESS NOTES
SALLY Mai  1970.54 y.o. female   [id number]      HEARING AID EVALUATION    SALLY Mai was seen today, 1/13/2025 , for a Hearing Aid Evaluation following audiologic evaluation on 1/13/2025.  SALLY Mai was accompanied by her wife.  Patient has no prior history of hearing aid use. Patient was found to have a direct insurance benefit (see scanned worksheet). Patient understands she is responsible for any cost insurance does not cover. Hearing aid benefit worksheet scanned into media tab.     DATE: 1/13/2025     PROCEDURES:     After a discussion of evaluation results, discussion of levels of technology and prices, and lifestyle assessment. SALLY Mai has decided to consider the options and contact Audiology when a decision has been made.. Color P8,  power OM, medium open dome left, medium vented dome right. Signed evaluation agreement.    Technology to be considered: TBD.        Patient cost due at time of fitting: TBD - see scanned worksheet    Patient decided to trial the U[employer]ideeo I90-R devices for 2 weeks to determine benefit before purchasing. Signed trial device loaner agreement. Patient aware they need to return by 1/27/25. See below for device information:  Right Ear: [id number]  Left Ear: [id number]  Accessory:  (SN: [id number])    Patient paid $100 for today's appointment. Do not bill insurance.     Discussed with patient that any portion of the total cost that is not paid by insurance will be the patient's financial responsibility. Estimated insurance coverage is a verification of benefit and not a guarantee.     PATIENT EDUCATION:      - Verbally reviewed benefits and limitations of devices and available features in hearing aids.    - Verbally discussed realistic expectations of hearing aid use     RECOMMENDATIONS:      - Contact Audiology when a decision has been made to pursue hearing aids.     - Return loaner hearing aids by 1/27/25      Julia Kinney

## 2025-01-13 NOTE — PROGRESS NOTES
The following items are recommended based on patient report and results from today's appointment:   - Continue medical follow-up with Janna Meek MD .   - Retest hearing as medically indicated and/or sooner if a change in hearing is noted.  - If desired, schedule a Hearing Aid Evaluation (HAE) appointment to discuss hearing aid options.  - Utilize \"Good Communication Strategies\" as discussed to assist in speech understanding with communication partners.    Laura Bernard  Audiologist         Degree of   Hearing Sensitivity dB Range   Within Normal Limits (WNL) 0 - 20   Mild 20 - 40   Moderate 40 - 55   Moderately-Severe 55 - 70   Severe 70 - 90   Profound 90 +

## 2025-01-31 ENCOUNTER — PROCEDURE VISIT (OUTPATIENT)
Dept: AUDIOLOGY | Age: 55
End: 2025-01-31

## 2025-01-31 DIAGNOSIS — H90.3 SENSORINEURAL HEARING LOSS (SNHL) OF BOTH EARS: Primary | ICD-10-CM

## 2025-01-31 DIAGNOSIS — H90.3 ASYMMETRIC SNHL (SENSORINEURAL HEARING LOSS): ICD-10-CM

## 2025-01-31 NOTE — PROGRESS NOTES
SALLY Mai   1970, 54 y.o. female   [id number]     HEARING AID FITTING      RIGHT EAR: /MODEL: Phonak Audeo A80-Gktebs  SN: [id number]  EARMOLD/TUBING/WIRE/DOME: 0M 6.0  large open   LEFT EAR: /MODEL: Phonak Audeo B24-Oftzyi  SN: [id number]  EARMOLD/TUBING/WIRE/DOME: 0M 6.0  medium vented  ACCESSORIES:  Case Go MONO SPH (SN: [id number])  HAF: 2025  WARRANTY: 2028  TRIAL PERIOD ENDS: 3/2/2025  L&D ELIGIBLE: Yes    BUTTONS: ENABLED  PROGRAMS: DISABLED  PHONE CONNECTIVITY: Connected to Phone and Sarabjit    SALLY Mia was seen today,2025,  to be fit with binaural Phonak Audeo I50-Lpfiiu hearing aids.  Hearing aids were programmed to 100% target gain.  Fit is appropriate. REM completed.     Patient reported that during her trial, the left dome irritated her ears. She noted that it was slightly painful. She also stated that the left device seemed to cut in and out, as if there was something wrong with it.     RECHARGEABLE Device orientation was completed, includin. Hearing aid insertion/removal   2. Buttons/Indicators   3. Rechargeable battery use and life expectancy     4.  insertion/removal     5. Cleaning/maintenance of the device     6. Loss & Damage/Repair warranty information   7. 30-day right-to-return period      SALLY Mai noted good sound quality.  It was recommended that patient return for a follow-up appointment within the 30-day right-to-return period for further programming adjustments and education of the devices as warranted.      PATIENT EDUCATION:       SALLY Mai was provided with the Hearing Aid Fitting Checklist as a reference of items discussed at today's appointment.  Patient may find additional product information in the provided hearing aid  device manual.      Unbundled Billing- see associated fees and codes below:    Description Code Amount   Hearing Aids  $4476.50   Dispensing Fee  $300.00

## 2025-02-14 ENCOUNTER — PROCEDURE VISIT (OUTPATIENT)
Dept: AUDIOLOGY | Age: 55
End: 2025-02-14

## 2025-02-14 DIAGNOSIS — H90.3 SENSORINEURAL HEARING LOSS (SNHL) OF BOTH EARS: Primary | ICD-10-CM

## 2025-02-14 NOTE — PROGRESS NOTES
SALLY Adelaide Mai  1970.54 y.o. female   [id number]    HEARING AID CHECK      RIGHT EAR: /MODEL: Phonak Audeo F95-Rejtoc  SN: [id number]  EARMOLD/TUBING/WIRE/DOME: 0M 6.0  large open   LEFT EAR: /MODEL: Phonak Audeo B68-Skkatg  SN: [id number]  EARMOLD/TUBING/WIRE/DOME: 00M 6.0  medium vented  ACCESSORIES:  Case Go MONO SPH (SN: [id number])  HAF: 1/31/2025  WARRANTY: 2/18/2028  TRIAL PERIOD ENDS: 3/2/2025  L&D ELIGIBLE: Yes     BUTTONS: ENABLED  PROGRAMS: DISABLED  PHONE CONNECTIVITY: Connected to Phone and Sarabjit        SALLY Adelaide Mai was seen today, 2/14/2025, for a hearing aid check appointment.    PROCEDURES:     Patient noted overall benefit from the hearing aids. She stated that both hearing aids are falling off her ears regularly. She noted that she did not like the retention tag so she took it off. Patient stated that she is having some soreness on the top of her right ear. She denied any more issues with the left hearing aid cutting in and out. She reported that sometimes in the morning, they seem almost too loud, so she will often turn them down.      A left 00M wire was available, so it was coupled to the left hearing aid. A visual inspection revealed a much better fit of the hearing aid. Unfortunately, a right 00M wire was not available so it will be ordered. Discussed different dome sizes for the left ear. Coupled a small open dome to the left hearing aid and provided patient with a medium and large open dome to trial for best fit. A right  was ordered during the appointment, and patient will return on 2/20 with Dr. Olivier to exchange the wire.     Discussed adjusting patient's baseline to be slightly softer than it is now. Patient expressed that she's fine leaving it where it is and adjusting as needed.     PATIENT EDUCATION:     - Verbally and visually reviewed importance of consistent use and care and maintenance of devices.      Information was verbally shared

## 2025-02-19 NOTE — PROGRESS NOTES
SALLY Adelaide Mai  1970.54 y.o. female   [id number]    HEARING AID CHECK    RIGHT EAR: /MODEL: Phonak Audeo T19-Muzblh  SN: [id number]  EARMOLD/TUBING/WIRE/DOME: 0M 6.0  large open   LEFT EAR: /MODEL: Phonak Audeo C08-Mrrxkf  SN: [id number]  EARMOLD/TUBING/WIRE/DOME: 00M 6.0  medium vented  ACCESSORIES:  Case Go MONO SPH (SN: [id number])  HAF: 1/31/2025  WARRANTY: 2/18/2028   warranty return period extended to 4/19/25, per patient request   TRIAL PERIOD ENDS: 3/2/2025    L&D ELIGIBLE: Yes     BUTTONS: ENABLED  PROGRAMS: DISABLED  PHONE CONNECTIVITY: Connected to Phone and Sarabjit (Sweeten)      SALLY Adelaide Mai was seen today, 2/20/2025, for a hearing aid check appointment.    HEARING AID CLEAN/CHECK:     Patient was seen to be fitted with a 00M 6.0  on the Right Phonak Audeo N22-Pzcviy hearing aid. Patient reported that the devices sound clear, but she is interested in wearing the Phonak Audeo Infinio devices since the base of the hearing aid is a smaller size than her current Audeo Infinio Sphere devices.     She also reported that the Right hearing aid dome (Medium Vented) was comfortable but felt slightly too big in the ear canal.     Discussed differences between Infio and Infinio Sphere hearing aids. Patient requested to be fit with Audeo I90-Rs for a 2-week trial (No Charge).     HEARING AID LOANER PROGRAMMING:     I fit the patient with demo Phonak Audeo I90-R devices at today's visit. I fit the 00M 6.0 Right and Left receivers onto the demo devices. Programmed to 95% Overall Gain. Ran Feedback Manager. Patient reported comfortable fit and sound quality.     Patient was fit with the following Phonak Audeo I90-R loaners:  Right S/N: [id number]  Left S/N: [id number]   S/N: [id number]    Connected the demo Phonak Audeo I90-R devices to her Android phone for sarabjit usage and bluetooth streaming.     I offered to switch the Right hearing aid from a Medium

## 2025-02-20 ENCOUNTER — PROCEDURE VISIT (OUTPATIENT)
Dept: AUDIOLOGY | Age: 55
End: 2025-02-20

## 2025-02-20 DIAGNOSIS — H90.3 SENSORINEURAL HEARING LOSS (SNHL) OF BOTH EARS: Primary | ICD-10-CM

## 2025-03-03 ENCOUNTER — TELEPHONE (OUTPATIENT)
Dept: PRIMARY CARE CLINIC | Age: 55
End: 2025-03-03

## 2025-03-03 NOTE — TELEPHONE ENCOUNTER
----- Message from Blanca ZAVALA sent at 3/3/2025  8:33 AM EST -----  Regarding: ECC Referral Request  ECC Referral Request    Reason for referral request: Lab/Test Order    Specialist/Lab/Test patient is requesting (if known):blood work    Specialist Phone Number (if applicable):    Additional Information /Patient want to request an order for blood work.  --------------------------------------------------------------------------------------------------------------------------    Relationship to Patient: Self     Call Back Information: OK to leave message on voicemail  Preferred Call Back Number: Phone [phone] (home)

## 2025-03-03 NOTE — TELEPHONE ENCOUNTER
----- Message from Blanca ZAVALA sent at 3/3/2025  8:33 AM EST -----  Regarding: ECC Referral Request  ECC Referral Request    Reason for referral request: Lab/Test Order    Specialist/Lab/Test patient is requesting (if known):blood work    Specialist Phone Number (if applicable):    Additional Information /Patient want to request an order for blood work.  --------------------------------------------------------------------------------------------------------------------------    Relationship to Patient: Self     Call Back Information: OK to leave message on voicemail  Preferred Call Back Number: Phone [phone] (home)

## 2025-03-03 NOTE — TELEPHONE ENCOUNTER
----- Message from Blanca ZAVALA sent at 3/3/2025  8:31 AM EST -----  Regarding: ECC Appointment Request  ECC Appointment Request    Patient needs appointment for ECC Appointment Type: Existing Condition Follow Up.    Patient Requested Dates(s):any day next week   Patient Requested Time:anytime   Provider Name:   Janna Meek MD        Reason for Appointment Request: Established Patient - Available appointments did not meet patient need/Patient want to make an appointment for follow up for blood works and also talk about waiting .  --------------------------------------------------------------------------------------------------------------------------    Relationship to Patient: Self     Call Back Information: OK to leave message on voicemail  Preferred Call Back Number: Phone [phone] (home)

## 2025-03-04 DIAGNOSIS — E78.2 MIXED HYPERLIPIDEMIA: ICD-10-CM

## 2025-03-04 DIAGNOSIS — R73.03 PREDIABETES: ICD-10-CM

## 2025-03-04 DIAGNOSIS — N18.31 STAGE 3A CHRONIC KIDNEY DISEASE (HCC): ICD-10-CM

## 2025-03-04 LAB
ALBUMIN SERPL-MCNC: 4.5 G/DL (ref 3.4–5)
ALBUMIN/GLOB SERPL: 1.7 {RATIO} (ref 1.1–2.2)
ALP SERPL-CCNC: 89 U/L (ref 40–129)
ALT SERPL-CCNC: 26 U/L (ref 10–40)
ANION GAP SERPL CALCULATED.3IONS-SCNC: 9 MMOL/L (ref 3–16)
AST SERPL-CCNC: 29 U/L (ref 15–37)
BILIRUB SERPL-MCNC: 0.4 MG/DL (ref 0–1)
BUN SERPL-MCNC: 8 MG/DL (ref 7–20)
CALCIUM SERPL-MCNC: 10 MG/DL (ref 8.3–10.6)
CHLORIDE SERPL-SCNC: 103 MMOL/L (ref 99–110)
CHOLEST SERPL-MCNC: 161 MG/DL (ref 0–199)
CO2 SERPL-SCNC: 31 MMOL/L (ref 21–32)
CREAT SERPL-MCNC: 1.1 MG/DL (ref 0.6–1.1)
GFR SERPLBLD CREATININE-BSD FMLA CKD-EPI: 60 ML/MIN/{1.73_M2}
GLUCOSE SERPL-MCNC: 102 MG/DL (ref 70–99)
HDLC SERPL-MCNC: 43 MG/DL (ref 40–60)
LDLC SERPL CALC-MCNC: 99 MG/DL
POTASSIUM SERPL-SCNC: 4.3 MMOL/L (ref 3.5–5.1)
PROT SERPL-MCNC: 7.2 G/DL (ref 6.4–8.2)
SODIUM SERPL-SCNC: 143 MMOL/L (ref 136–145)
TRIGL SERPL-MCNC: 96 MG/DL (ref 0–150)
VLDLC SERPL CALC-MCNC: 19 MG/DL

## 2025-03-05 LAB
EST. AVERAGE GLUCOSE BLD GHB EST-MCNC: 125.5 MG/DL
HBA1C MFR BLD: 6 %

## 2025-03-06 ENCOUNTER — PROCEDURE VISIT (OUTPATIENT)
Dept: AUDIOLOGY | Age: 55
End: 2025-03-06

## 2025-03-06 DIAGNOSIS — H90.3 SENSORINEURAL HEARING LOSS (SNHL) OF BOTH EARS: Primary | ICD-10-CM

## 2025-03-06 NOTE — PROGRESS NOTES
Phonak Audeo J99-Wyfbnz devices, which she was initially fitted with on 1/31/25. Patient's Right-to-Return period on these devices ends on 4/19/25:   Right S/N: [id number]   Left S/N: [id number]    S/N: [id number]     Patient has also returned the following Phonak Audeo I90-R Loaner devices:   Right S/N: [id number]  Left S/N: [id number]   S/N: [id number]    All devices and chargers have been returned in good condition. The Phonak Audeo S44-Wutyvw devices were returned for credit to the , mailed on 3/6/25.     PATIENT EDUCATION:     SALLY Mai was provided with the Hearing Aid Fitting Checklist as a reference of items discussed at today's appointment. Completed an updated Hearing Aid Purchase Agreement. See scanned under \"Media\" tab.     No Charge (Even exchange for hearing aids).    RECOMMENDATIONS:     Return for follow-up Hearing Aid Check appointment in 3-4 months or sooner as needed.       Laura Conway  Audiologist    NO CHARGE  
Yes

## 2025-03-21 ENCOUNTER — OFFICE VISIT (OUTPATIENT)
Dept: PRIMARY CARE CLINIC | Age: 55
End: 2025-03-21
Payer: COMMERCIAL

## 2025-03-21 VITALS
DIASTOLIC BLOOD PRESSURE: 88 MMHG | TEMPERATURE: 96.9 F | SYSTOLIC BLOOD PRESSURE: 138 MMHG | RESPIRATION RATE: 16 BRPM | HEART RATE: 84 BPM | HEIGHT: 61 IN | OXYGEN SATURATION: 99 % | BODY MASS INDEX: 37.57 KG/M2 | WEIGHT: 199 LBS

## 2025-03-21 DIAGNOSIS — Z00.00 ENCOUNTER FOR WELL ADULT EXAM WITHOUT ABNORMAL FINDINGS: Primary | ICD-10-CM

## 2025-03-21 DIAGNOSIS — N18.31 STAGE 3A CHRONIC KIDNEY DISEASE (HCC): ICD-10-CM

## 2025-03-21 DIAGNOSIS — R73.03 PREDIABETES: ICD-10-CM

## 2025-03-21 DIAGNOSIS — R82.998 LEUKOCYTES IN URINE: ICD-10-CM

## 2025-03-21 DIAGNOSIS — E66.812 CLASS 2 SEVERE OBESITY DUE TO EXCESS CALORIES WITH SERIOUS COMORBIDITY AND BODY MASS INDEX (BMI) OF 37.0 TO 37.9 IN ADULT: ICD-10-CM

## 2025-03-21 DIAGNOSIS — M17.0 PRIMARY OSTEOARTHRITIS OF BOTH KNEES: ICD-10-CM

## 2025-03-21 DIAGNOSIS — E66.01 CLASS 2 SEVERE OBESITY DUE TO EXCESS CALORIES WITH SERIOUS COMORBIDITY AND BODY MASS INDEX (BMI) OF 37.0 TO 37.9 IN ADULT: ICD-10-CM

## 2025-03-21 DIAGNOSIS — D33.3 VESTIBULAR SCHWANNOMA (HCC): ICD-10-CM

## 2025-03-21 DIAGNOSIS — Z00.00 ENCOUNTER FOR WELL ADULT EXAM WITHOUT ABNORMAL FINDINGS: ICD-10-CM

## 2025-03-21 DIAGNOSIS — E78.2 MIXED HYPERLIPIDEMIA: ICD-10-CM

## 2025-03-21 DIAGNOSIS — Z12.31 ENCOUNTER FOR SCREENING MAMMOGRAM FOR BREAST CANCER: ICD-10-CM

## 2025-03-21 LAB
BILIRUBIN, POC: NORMAL
BLOOD URINE, POC: NORMAL
CLARITY, POC: CLEAR
COLOR, POC: YELLOW
GLUCOSE URINE, POC: NORMAL MG/DL
KETONES, POC: NORMAL MG/DL
LEUKOCYTE EST, POC: NORMAL
NITRITE, POC: NORMAL
PH, POC: 6
PROTEIN, POC: NORMAL MG/DL
SPECIFIC GRAVITY, POC: 1.02
UROBILINOGEN, POC: 0.2 MG/DL

## 2025-03-21 PROCEDURE — 81002 URINALYSIS NONAUTO W/O SCOPE: CPT | Performed by: INTERNAL MEDICINE

## 2025-03-21 PROCEDURE — 99396 PREV VISIT EST AGE 40-64: CPT | Performed by: INTERNAL MEDICINE

## 2025-03-21 RX ORDER — ROSUVASTATIN CALCIUM 20 MG/1
20 TABLET, COATED ORAL DAILY
Qty: 90 TABLET | Refills: 1 | Status: SHIPPED | OUTPATIENT
Start: 2025-03-21

## 2025-03-21 SDOH — ECONOMIC STABILITY: FOOD INSECURITY: WITHIN THE PAST 12 MONTHS, THE FOOD YOU BOUGHT JUST DIDN'T LAST AND YOU DIDN'T HAVE MONEY TO GET MORE.: NEVER TRUE

## 2025-03-21 SDOH — ECONOMIC STABILITY: FOOD INSECURITY: WITHIN THE PAST 12 MONTHS, YOU WORRIED THAT YOUR FOOD WOULD RUN OUT BEFORE YOU GOT MONEY TO BUY MORE.: NEVER TRUE

## 2025-03-21 ASSESSMENT — PATIENT HEALTH QUESTIONNAIRE - PHQ9
5. POOR APPETITE OR OVEREATING: NOT AT ALL
10. IF YOU CHECKED OFF ANY PROBLEMS, HOW DIFFICULT HAVE THESE PROBLEMS MADE IT FOR YOU TO DO YOUR WORK, TAKE CARE OF THINGS AT HOME, OR GET ALONG WITH OTHER PEOPLE: NOT DIFFICULT AT ALL
8. MOVING OR SPEAKING SO SLOWLY THAT OTHER PEOPLE COULD HAVE NOTICED. OR THE OPPOSITE, BEING SO FIGETY OR RESTLESS THAT YOU HAVE BEEN MOVING AROUND A LOT MORE THAN USUAL: NOT AT ALL
1. LITTLE INTEREST OR PLEASURE IN DOING THINGS: NOT AT ALL
4. FEELING TIRED OR HAVING LITTLE ENERGY: SEVERAL DAYS
5. POOR APPETITE OR OVEREATING: NOT AT ALL
SUM OF ALL RESPONSES TO PHQ QUESTIONS 1-9: 4
7. TROUBLE CONCENTRATING ON THINGS, SUCH AS READING THE NEWSPAPER OR WATCHING TELEVISION: SEVERAL DAYS
SUM OF ALL RESPONSES TO PHQ QUESTIONS 1-9: 4
2. FEELING DOWN, DEPRESSED OR HOPELESS: NOT AT ALL
6. FEELING BAD ABOUT YOURSELF - OR THAT YOU ARE A FAILURE OR HAVE LET YOURSELF OR YOUR FAMILY DOWN: SEVERAL DAYS
10. IF YOU CHECKED OFF ANY PROBLEMS, HOW DIFFICULT HAVE THESE PROBLEMS MADE IT FOR YOU TO DO YOUR WORK, TAKE CARE OF THINGS AT HOME, OR GET ALONG WITH OTHER PEOPLE: NOT DIFFICULT AT ALL
3. TROUBLE FALLING OR STAYING ASLEEP: SEVERAL DAYS
SUM OF ALL RESPONSES TO PHQ QUESTIONS 1-9: 4
8. MOVING OR SPEAKING SO SLOWLY THAT OTHER PEOPLE COULD HAVE NOTICED. OR THE OPPOSITE - BEING SO FIDGETY OR RESTLESS THAT YOU HAVE BEEN MOVING AROUND A LOT MORE THAN USUAL: NOT AT ALL
7. TROUBLE CONCENTRATING ON THINGS, SUCH AS READING THE NEWSPAPER OR WATCHING TELEVISION: SEVERAL DAYS
4. FEELING TIRED OR HAVING LITTLE ENERGY: SEVERAL DAYS
2. FEELING DOWN, DEPRESSED OR HOPELESS: NOT AT ALL
3. TROUBLE FALLING OR STAYING ASLEEP: SEVERAL DAYS
9. THOUGHTS THAT YOU WOULD BE BETTER OFF DEAD, OR OF HURTING YOURSELF: NOT AT ALL
1. LITTLE INTEREST OR PLEASURE IN DOING THINGS: NOT AT ALL
9. THOUGHTS THAT YOU WOULD BE BETTER OFF DEAD, OR OF HURTING YOURSELF: NOT AT ALL
6. FEELING BAD ABOUT YOURSELF - OR THAT YOU ARE A FAILURE OR HAVE LET YOURSELF OR YOUR FAMILY DOWN: SEVERAL DAYS

## 2025-03-21 NOTE — PROGRESS NOTES
Well Adult Note  Name: SALLY Mai Today’s Date: 3/21/2025   MRN: [id number] Sex: Female   Age: 54 y.o. Ethnicity: Non- / Non    : 1970 Race: Black /       SALLY Mai is here for a well adult exam.          Assessment & Plan  1. Encounter for well adult exam:  - Comprehensive metabolic panel and lipid panel done on  3/4/2025  - CBC with differential  - TSH  - Urinalysis  - Pap smear conducted on 2024  - Cologuard test performed on 2024  - mammogram  done on 2024 and mammogram ordered  - patient declines Tdap, Shingrix, pneumonia vaccine, Hepatitis B vaccine, and COVID booster    2. Mixed hyperlipidemia:  - Condition remains stable  - Maintain current regimen of rosuvastatin 20 mg daily  - Advised to adhere to a low-fat, low-cholesterol diet  - Engage in regular aerobic exercise    3. Chronic kidney disease:  - Condition is stable with improved GFR of 60  - Continue avoiding NSAIDs  - Referral to nephrology     4. Prediabetes:  - Condition is stable with hemoglobin A1c of 6.0%  - Advised to follow a low-carb diet  - Engage in regular aerobic exercise    5. Class 2 severe obesity:  - BMI is 37.60  - Start Zepbound 2.5 mg subcutaneously weekly  - Advised to follow a low carbohydrate diet  - Consume at least 64 ounces of water daily  - Monitor weight weekly  - Engage in regular aerobic exercise aiming for a cardio goal of 150 minutes per week    6. Encounter for screening mammogram for breast cancer  - mammogram ordered    7. Leukocytes in urine  - urine culture    8. Primary osteoarthritis of both knees:  - Manages condition with Tylenol Arthritis  - Unable to take NSAIDs due to chronic kidney disease  - continue care per Orthopedics    9. Vestibular schwannoma:  - Continue care under neurosurgery  - history of surgery in   - history of radiation treatment  - has hearing aids        Results     Lab Review   Results for orders placed or performed in

## 2025-03-22 LAB
BASOPHILS # BLD: 0 K/UL (ref 0–0.2)
BASOPHILS NFR BLD: 0.9 %
DEPRECATED RDW RBC AUTO: 15 % (ref 12.4–15.4)
EOSINOPHIL # BLD: 0.3 K/UL (ref 0–0.6)
EOSINOPHIL NFR BLD: 5.5 %
HCT VFR BLD AUTO: 39 % (ref 36–48)
HGB BLD-MCNC: 13.1 G/DL (ref 12–16)
LYMPHOCYTES # BLD: 1.5 K/UL (ref 1–5.1)
LYMPHOCYTES NFR BLD: 28.7 %
MCH RBC QN AUTO: 29.9 PG (ref 26–34)
MCHC RBC AUTO-ENTMCNC: 33.6 G/DL (ref 31–36)
MCV RBC AUTO: 89.2 FL (ref 80–100)
MONOCYTES # BLD: 0.3 K/UL (ref 0–1.3)
MONOCYTES NFR BLD: 6.4 %
NEUTROPHILS # BLD: 3.1 K/UL (ref 1.7–7.7)
NEUTROPHILS NFR BLD: 58.5 %
PLATELET # BLD AUTO: 281 K/UL (ref 135–450)
PMV BLD AUTO: 8.9 FL (ref 5–10.5)
RBC # BLD AUTO: 4.37 M/UL (ref 4–5.2)
TSH SERPL DL<=0.005 MIU/L-ACNC: 0.81 UIU/ML (ref 0.27–4.2)
WBC # BLD AUTO: 5.4 K/UL (ref 4–11)

## 2025-03-23 ENCOUNTER — RESULTS FOLLOW-UP (OUTPATIENT)
Dept: PRIMARY CARE CLINIC | Age: 55
End: 2025-03-23

## 2025-03-23 DIAGNOSIS — R31.29 MICROSCOPIC HEMATURIA: ICD-10-CM

## 2025-03-23 DIAGNOSIS — R82.998 LEUKOCYTES IN URINE: Primary | ICD-10-CM

## 2025-03-23 LAB — BACTERIA UR CULT: NORMAL

## 2025-03-30 ENCOUNTER — RESULTS FOLLOW-UP (OUTPATIENT)
Dept: PRIMARY CARE CLINIC | Age: 55
End: 2025-03-30

## 2025-03-30 PROBLEM — R82.998 LEUKOCYTES IN URINE: Status: ACTIVE | Noted: 2025-03-30

## 2025-03-30 PROBLEM — Z00.00 ENCOUNTER FOR WELL ADULT EXAM WITHOUT ABNORMAL FINDINGS: Status: ACTIVE | Noted: 2025-03-30

## 2025-03-30 PROBLEM — M17.0 PRIMARY OSTEOARTHRITIS OF BOTH KNEES: Status: ACTIVE | Noted: 2025-03-30

## 2025-03-30 PROBLEM — D33.3 VESTIBULAR SCHWANNOMA (HCC): Status: ACTIVE | Noted: 2025-03-30

## 2025-04-02 ENCOUNTER — TELEPHONE (OUTPATIENT)
Dept: ADMINISTRATIVE | Age: 55
End: 2025-04-02

## 2025-04-02 NOTE — TELEPHONE ENCOUNTER
Submitted PA for Zepbound 2.5MG/0.5ML pen-injectors  Via CMM Key: [id number] STATUS: MESSAGE FROM PLAN     .There was an error with your request  Patient Inactive     Called plan @ [phone] spoke with    Yessi CONNOR patient is active with plan.    This medication is not covered by plan.    This medication is a Plan Exclusion; not a Denial.  The option for Appeal is not available because it is not a covered product.    If the patient is persistent that they want a specific medication that is not covered by the plan; then they can call the insurance and attempt to get a Formulary Override, or a Coverage Determination.      If this requires a response please respond to the pool ( P MHCX PSC MEDICATION PRE-AUTH).      Thank you please advise patient.

## 2025-04-20 PROBLEM — Z12.31 ENCOUNTER FOR SCREENING MAMMOGRAM FOR BREAST CANCER: Status: RESOLVED | Noted: 2025-03-21 | Resolved: 2025-04-20

## 2025-04-29 PROBLEM — Z00.00 ENCOUNTER FOR WELL ADULT EXAM WITHOUT ABNORMAL FINDINGS: Status: RESOLVED | Noted: 2025-03-30 | Resolved: 2025-04-29

## 2025-05-06 ENCOUNTER — TELEPHONE (OUTPATIENT)
Dept: ENT CLINIC | Age: 55
End: 2025-05-06

## 2025-05-06 DIAGNOSIS — H91.90 HEARING LOSS, UNSPECIFIED HEARING LOSS TYPE, UNSPECIFIED LATERALITY: Primary | ICD-10-CM

## 2025-05-06 NOTE — TELEPHONE ENCOUNTER
Pt calling, states she is having ringing in her ears; she currently has hearing aids and says she has had the ringing before and that the hearing aids have not helped with this. She says she had a schwannoma and is wondering if that might be the cause of the ringing and if so,if there is nothing that can be done about it. Last audio jan, 2025 and got her hearing aids in jan, 2025 as well.  Please call to advise.

## 2025-05-21 ENCOUNTER — HOSPITAL ENCOUNTER (OUTPATIENT)
Dept: GENERAL RADIOLOGY | Age: 55
Discharge: HOME | End: 2025-05-21
Payer: COMMERCIAL

## 2025-05-21 ENCOUNTER — RESULTS FOLLOW-UP (OUTPATIENT)
Dept: PRIMARY CARE CLINIC | Age: 55
End: 2025-05-21

## 2025-05-21 ENCOUNTER — OFFICE VISIT (OUTPATIENT)
Dept: PRIMARY CARE CLINIC | Age: 55
End: 2025-05-21

## 2025-05-21 ENCOUNTER — HOSPITAL ENCOUNTER (OUTPATIENT)
Dept: CT IMAGING | Age: 55
Discharge: HOME | End: 2025-05-21
Payer: COMMERCIAL

## 2025-05-21 VITALS
HEIGHT: 61 IN | WEIGHT: 191 LBS | DIASTOLIC BLOOD PRESSURE: 88 MMHG | HEART RATE: 78 BPM | TEMPERATURE: 96.8 F | RESPIRATION RATE: 16 BRPM | SYSTOLIC BLOOD PRESSURE: 110 MMHG | BODY MASS INDEX: 36.06 KG/M2 | OXYGEN SATURATION: 94 %

## 2025-05-21 DIAGNOSIS — R06.02 SHORTNESS OF BREATH: ICD-10-CM

## 2025-05-21 DIAGNOSIS — R07.9 CHEST PAIN, UNSPECIFIED TYPE: ICD-10-CM

## 2025-05-21 DIAGNOSIS — R53.83 FATIGUE, UNSPECIFIED TYPE: ICD-10-CM

## 2025-05-21 DIAGNOSIS — R07.81 PLEURITIC PAIN: ICD-10-CM

## 2025-05-21 DIAGNOSIS — R79.89 ELEVATED D-DIMER: ICD-10-CM

## 2025-05-21 DIAGNOSIS — R51.9 NONINTRACTABLE HEADACHE, UNSPECIFIED CHRONICITY PATTERN, UNSPECIFIED HEADACHE TYPE: ICD-10-CM

## 2025-05-21 DIAGNOSIS — R07.9 CHEST PAIN, UNSPECIFIED TYPE: Primary | ICD-10-CM

## 2025-05-21 DIAGNOSIS — J92.9 PLEURAL THICKENING: ICD-10-CM

## 2025-05-21 DIAGNOSIS — Z91.041 CONTRAST MEDIA ALLERGY: ICD-10-CM

## 2025-05-21 LAB
D-DIMER QUANTITATIVE: 0.74 UG/ML FEU (ref 0–0.6)
PERFORMED ON: ABNORMAL
POC CREATININE: 1.2 MG/DL (ref 0.6–1.1)
POC SAMPLE TYPE: ABNORMAL

## 2025-05-21 PROCEDURE — 82565 ASSAY OF CREATININE: CPT

## 2025-05-21 PROCEDURE — 6360000004 HC RX CONTRAST MEDICATION: Performed by: INTERNAL MEDICINE

## 2025-05-21 PROCEDURE — 71275 CT ANGIOGRAPHY CHEST: CPT

## 2025-05-21 PROCEDURE — 71046 X-RAY EXAM CHEST 2 VIEWS: CPT

## 2025-05-21 RX ORDER — DIPHENHYDRAMINE HCL 25 MG
50 CAPSULE ORAL ONCE
Qty: 2 CAPSULE | Refills: 0 | Status: SHIPPED | OUTPATIENT
Start: 2025-05-21 | End: 2025-05-21

## 2025-05-21 RX ORDER — IOPAMIDOL 755 MG/ML
75 INJECTION, SOLUTION INTRAVASCULAR
Status: COMPLETED | OUTPATIENT
Start: 2025-05-21 | End: 2025-05-21

## 2025-05-21 RX ORDER — ALBUTEROL SULFATE 90 UG/1
2 INHALANT RESPIRATORY (INHALATION) EVERY 6 HOURS PRN
Qty: 18 G | Refills: 0 | Status: SHIPPED | OUTPATIENT
Start: 2025-05-21

## 2025-05-21 RX ADMIN — IOPAMIDOL 75 ML: 755 INJECTION, SOLUTION INTRAVENOUS at 16:44

## 2025-05-21 SDOH — ECONOMIC STABILITY: FOOD INSECURITY: WITHIN THE PAST 12 MONTHS, THE FOOD YOU BOUGHT JUST DIDN'T LAST AND YOU DIDN'T HAVE MONEY TO GET MORE.: NEVER TRUE

## 2025-05-21 SDOH — ECONOMIC STABILITY: FOOD INSECURITY: WITHIN THE PAST 12 MONTHS, YOU WORRIED THAT YOUR FOOD WOULD RUN OUT BEFORE YOU GOT MONEY TO BUY MORE.: NEVER TRUE

## 2025-05-21 ASSESSMENT — PATIENT HEALTH QUESTIONNAIRE - PHQ9
7. TROUBLE CONCENTRATING ON THINGS, SUCH AS READING THE NEWSPAPER OR WATCHING TELEVISION: NOT AT ALL
8. MOVING OR SPEAKING SO SLOWLY THAT OTHER PEOPLE COULD HAVE NOTICED. OR THE OPPOSITE, BEING SO FIGETY OR RESTLESS THAT YOU HAVE BEEN MOVING AROUND A LOT MORE THAN USUAL: NOT AT ALL
3. TROUBLE FALLING OR STAYING ASLEEP: NOT AT ALL
SUM OF ALL RESPONSES TO PHQ QUESTIONS 1-9: 0
2. FEELING DOWN, DEPRESSED OR HOPELESS: NOT AT ALL
9. THOUGHTS THAT YOU WOULD BE BETTER OFF DEAD, OR OF HURTING YOURSELF: NOT AT ALL
SUM OF ALL RESPONSES TO PHQ QUESTIONS 1-9: 0
10. IF YOU CHECKED OFF ANY PROBLEMS, HOW DIFFICULT HAVE THESE PROBLEMS MADE IT FOR YOU TO DO YOUR WORK, TAKE CARE OF THINGS AT HOME, OR GET ALONG WITH OTHER PEOPLE: NOT DIFFICULT AT ALL
1. LITTLE INTEREST OR PLEASURE IN DOING THINGS: NOT AT ALL
6. FEELING BAD ABOUT YOURSELF - OR THAT YOU ARE A FAILURE OR HAVE LET YOURSELF OR YOUR FAMILY DOWN: NOT AT ALL
SUM OF ALL RESPONSES TO PHQ QUESTIONS 1-9: 0
5. POOR APPETITE OR OVEREATING: NOT AT ALL
4. FEELING TIRED OR HAVING LITTLE ENERGY: NOT AT ALL
SUM OF ALL RESPONSES TO PHQ QUESTIONS 1-9: 0

## 2025-05-21 NOTE — PROGRESS NOTES
heard.     No friction rub. No gallop.   Pulmonary:      Effort: Pulmonary effort is normal. No respiratory distress.      Breath sounds: Normal breath sounds. No wheezing, rhonchi or rales.   Chest:   Breasts:     Breasts are symmetrical.      Right: No inverted nipple, mass, nipple discharge, skin change or tenderness.      Left: No inverted nipple, mass, nipple discharge, skin change or tenderness.   Abdominal:      General: Bowel sounds are normal. There is no distension.      Palpations: Abdomen is soft. There is no mass.      Tenderness: There is no abdominal tenderness. There is no rebound.   Musculoskeletal:         General: Normal range of motion.      Right shoulder: No tenderness. Normal range of motion.      Left shoulder: No tenderness. Normal range of motion.      Right upper arm: No tenderness.      Left upper arm: No tenderness.      Right elbow: No swelling. No tenderness.      Left elbow: No swelling. No tenderness.      Right wrist: No swelling or tenderness.      Left wrist: No swelling or tenderness.      Right hand: No swelling or tenderness.      Left hand: No swelling or tenderness.      Cervical back: Neck supple. No spasms or tenderness.      Thoracic back: No spasms or tenderness.      Lumbar back: No spasms or tenderness. Normal range of motion.      Right hip: No tenderness.      Left hip: No tenderness.      Right upper leg: No tenderness.      Left upper leg: No tenderness.      Right knee: No swelling. No tenderness.      Left knee: No swelling. No tenderness.      Right lower leg: No tenderness. No edema.      Left lower leg: No tenderness. No edema.      Right ankle: No swelling. No tenderness.      Left ankle: No swelling. No tenderness.      Right foot: No swelling or tenderness.      Left foot: No swelling or tenderness.   Lymphadenopathy:      Head:      Right side of head: No submandibular adenopathy.      Left side of head: No submandibular adenopathy.      Cervical: No

## 2025-05-22 ENCOUNTER — TELEPHONE (OUTPATIENT)
Dept: PULMONOLOGY | Age: 55
End: 2025-05-22

## 2025-05-22 LAB
ANION GAP SERPL CALCULATED.3IONS-SCNC: 8 MMOL/L (ref 3–16)
BASOPHILS # BLD: 0 K/UL (ref 0–0.2)
BASOPHILS NFR BLD: 0.9 %
BUN SERPL-MCNC: 7 MG/DL (ref 7–20)
CALCIUM SERPL-MCNC: 9.5 MG/DL (ref 8.3–10.6)
CHLORIDE SERPL-SCNC: 102 MMOL/L (ref 99–110)
CO2 SERPL-SCNC: 30 MMOL/L (ref 21–32)
CREAT SERPL-MCNC: 1.2 MG/DL (ref 0.6–1.1)
DEPRECATED RDW RBC AUTO: 14.6 % (ref 12.4–15.4)
EOSINOPHIL # BLD: 0.2 K/UL (ref 0–0.6)
EOSINOPHIL NFR BLD: 3.8 %
GFR SERPLBLD CREATININE-BSD FMLA CKD-EPI: 54 ML/MIN/{1.73_M2}
GLUCOSE SERPL-MCNC: 84 MG/DL (ref 70–99)
HCT VFR BLD AUTO: 38 % (ref 36–48)
HGB BLD-MCNC: 13.1 G/DL (ref 12–16)
LYMPHOCYTES # BLD: 1.1 K/UL (ref 1–5.1)
LYMPHOCYTES NFR BLD: 21 %
MCH RBC QN AUTO: 29.6 PG (ref 26–34)
MCHC RBC AUTO-ENTMCNC: 34.3 G/DL (ref 31–36)
MCV RBC AUTO: 86.1 FL (ref 80–100)
MONOCYTES # BLD: 0.3 K/UL (ref 0–1.3)
MONOCYTES NFR BLD: 5.7 %
NEUTROPHILS # BLD: 3.5 K/UL (ref 1.7–7.7)
NEUTROPHILS NFR BLD: 68.6 %
PLATELET # BLD AUTO: 269 K/UL (ref 135–450)
PMV BLD AUTO: 8.9 FL (ref 5–10.5)
POTASSIUM SERPL-SCNC: 4.7 MMOL/L (ref 3.5–5.1)
RBC # BLD AUTO: 4.42 M/UL (ref 4–5.2)
SODIUM SERPL-SCNC: 140 MMOL/L (ref 136–145)
WBC # BLD AUTO: 5.1 K/UL (ref 4–11)

## 2025-05-22 NOTE — TELEPHONE ENCOUNTER
Please review chart to determine if patient should be seen sooner than 7/17.  She is having chest pain causing shortness of breath.    Had CT and CXR. Should she have a pulmonary function test?

## 2025-05-28 ENCOUNTER — TELEPHONE (OUTPATIENT)
Dept: PULMONOLOGY | Age: 55
End: 2025-05-28

## 2025-05-28 ENCOUNTER — OFFICE VISIT (OUTPATIENT)
Dept: PULMONOLOGY | Age: 55
End: 2025-05-28
Payer: COMMERCIAL

## 2025-05-28 VITALS
SYSTOLIC BLOOD PRESSURE: 118 MMHG | HEIGHT: 61 IN | RESPIRATION RATE: 16 BRPM | OXYGEN SATURATION: 99 % | HEART RATE: 80 BPM | BODY MASS INDEX: 36.06 KG/M2 | WEIGHT: 191 LBS | DIASTOLIC BLOOD PRESSURE: 68 MMHG

## 2025-05-28 DIAGNOSIS — R91.1 LUNG NODULE: Primary | ICD-10-CM

## 2025-05-28 DIAGNOSIS — R07.89 ATYPICAL CHEST PAIN: ICD-10-CM

## 2025-05-28 DIAGNOSIS — J30.2 SEASONAL ALLERGIC RHINITIS, UNSPECIFIED TRIGGER: ICD-10-CM

## 2025-05-28 PROCEDURE — 99204 OFFICE O/P NEW MOD 45 MIN: CPT | Performed by: INTERNAL MEDICINE

## 2025-05-28 PROCEDURE — G2211 COMPLEX E/M VISIT ADD ON: HCPCS | Performed by: INTERNAL MEDICINE

## 2025-05-28 RX ORDER — AZELASTINE HYDROCHLORIDE, FLUTICASONE PROPIONATE 137; 50 UG/1; UG/1
1 SPRAY, METERED NASAL 2 TIMES DAILY
Qty: 23 G | Refills: 5 | Status: SHIPPED | OUTPATIENT
Start: 2025-05-28

## 2025-05-28 RX ORDER — ACETAMINOPHEN 325 MG/1
650 TABLET ORAL EVERY 6 HOURS PRN
COMMUNITY

## 2025-05-28 NOTE — TELEPHONE ENCOUNTER
Js faxed a Drug Change request.  Ryaltris is not covered by pts insurance.  The perferred alternatives are Azelastinehcl, Azelastine fluticasone, Flunisolide, Fluticasone Propionate, Mometasone Furoate, Please pick one and order for the patient.  I have attacked Drug Change request to this message. Thanks

## 2025-05-28 NOTE — ASSESSMENT & PLAN NOTE
Fall seems to be her worse season  Started topical steroid/antihistamine combination.   Ryaltris is not covered by insurance  Sent Dymista 2 spray BID

## 2025-05-28 NOTE — PROGRESS NOTES
[facility]   PULMONARY AND CRITICAL CARE MEDICINE    OUTPATIENT NOTE     SUBJECTIVE:   Referring Physician: Fantasma MERCADO    CHIEF COMPLAINT / HPI:    SALLY Bryson is a 54 y.o. female that initially presented to clinic for evaluation of Sob and chest pain.   This was associated to headaches, cardiac workup appeared to be unremarkable.   States even bending over would make her pain go very high 10/10 making her want to go to ED, has responded to Tylenol scheduled, has been off it for the past couple days, pain is more like discomfort.   Laying down also makes the pain worse. Pain is described as sharp and pressure like, mostly at the R parasternal area. Initially was associated to exertion.   Has a history of a schwannoma s/p XRT, also CKD, HL.   Has seasonal allergies as well, worst season seems to be fall.   Relevant Social History  Tobacco: Never smoker. and No significant second hand exposure reported.   Substances: No significant substance use history reported.   Occupational: No significant V/G/D/F exposures reported, used to be a .   Pets: Dogs x2  Home: No water damage, mold or infestations  Hobbies:  Yard work.   Family history of pulmonary problems: Niece with some bronchial stenosis.     Past Medical History:    Past Medical History:   Diagnosis Date    Acute renal failure 03/29/2011    Overview:  ICD-10 Transition    Depression     Does use hearing aid 02/21/2025    High cholesterol     unmedicated due to side-effects    History of palpitations     per pt: refused medication at time of diagnosis    Low back pain      Social History:    Social History     Tobacco Use   Smoking Status Never   Smokeless Tobacco Never       Family History:  Family History   Problem Relation Age of Onset    Heart Disease Mother 63    Arthritis Mother     Diabetes Father 63    Heart Disease Father     Kidney Disease Father     Hypertension Brother     Diabetes Brother     Diabetes Maternal

## 2025-05-29 ENCOUNTER — TELEPHONE (OUTPATIENT)
Dept: ADMINISTRATIVE | Age: 55
End: 2025-05-29

## 2025-05-29 NOTE — TELEPHONE ENCOUNTER
Submitted PA for Azelastine-Fluticasone 137-50MCG/ACT suspension  Via [facility] [id number]  STATUS: PENDING.    There was an error with your request  Patient Inactive     Called plan at [phone].     Submitted PA for Azelastine-Fluticasone 137-50MCG/ACT suspension, via Phone Call to Jay SUAREZ Reference # [id number] Status:The medication was DENIED  Will upload letter once received       Follow up done daily; if no response in three days we will refax for status check.  If another three days goes by with no response we will call the insurance for status.

## 2025-05-29 NOTE — TELEPHONE ENCOUNTER
The medication was DENIED; DENIAL letter is uploaded to MEDIA.    Generic Denial: Patient must complete step therapy, Unless there is a contraindication that you can provide.           Note :        If you want an APPEAL; please note in this encounter what new information you would like to APPEAL with.  Once complete route back to PA POOL.    If this requires a response please respond to the pool ( P MHCX PSC MEDICATION PRE-AUTH).      Thank you please advise patient.     Maria C Plascencia(Attending)

## 2025-05-31 PROBLEM — R07.81 PLEURITIC PAIN: Status: ACTIVE | Noted: 2025-05-31

## 2025-05-31 PROBLEM — R53.83 FATIGUE: Status: ACTIVE | Noted: 2025-05-31

## 2025-05-31 PROBLEM — Z91.041 CONTRAST MEDIA ALLERGY: Status: ACTIVE | Noted: 2025-05-31

## 2025-05-31 PROBLEM — R79.89 D-DIMER, ELEVATED: Status: ACTIVE | Noted: 2025-05-31

## 2025-05-31 PROBLEM — R79.89 ELEVATED D-DIMER: Status: ACTIVE | Noted: 2025-05-31

## 2025-05-31 PROBLEM — R07.9 CHEST PAIN: Status: ACTIVE | Noted: 2025-05-31

## 2025-05-31 PROBLEM — J92.9 PLEURAL THICKENING: Status: ACTIVE | Noted: 2025-05-31

## 2025-05-31 ASSESSMENT — ENCOUNTER SYMPTOMS
NAUSEA: 0
CONSTIPATION: 0
COUGH: 0
BLOOD IN STOOL: 0
VOMITING: 0
SINUS PAIN: 0
ABDOMINAL PAIN: 0
SINUS PRESSURE: 0
DIARRHEA: 0
CHEST TIGHTNESS: 0
WHEEZING: 0
SHORTNESS OF BREATH: 1
SORE THROAT: 0
RHINORRHEA: 0

## 2025-06-02 ENCOUNTER — PROCEDURE VISIT (OUTPATIENT)
Dept: AUDIOLOGY | Age: 55
End: 2025-06-02
Payer: COMMERCIAL

## 2025-06-02 ENCOUNTER — OFFICE VISIT (OUTPATIENT)
Dept: ENT CLINIC | Age: 55
End: 2025-06-02
Payer: COMMERCIAL

## 2025-06-02 VITALS
BODY MASS INDEX: 35.87 KG/M2 | WEIGHT: 190 LBS | DIASTOLIC BLOOD PRESSURE: 78 MMHG | HEART RATE: 77 BPM | SYSTOLIC BLOOD PRESSURE: 124 MMHG | HEIGHT: 61 IN

## 2025-06-02 DIAGNOSIS — H90.3 SENSORINEURAL HEARING LOSS (SNHL) OF BOTH EARS: Primary | ICD-10-CM

## 2025-06-02 DIAGNOSIS — H93.13 TINNITUS OF BOTH EARS: ICD-10-CM

## 2025-06-02 DIAGNOSIS — H93.11 TINNITUS OF RIGHT EAR: ICD-10-CM

## 2025-06-02 DIAGNOSIS — H90.3 ASYMMETRIC SNHL (SENSORINEURAL HEARING LOSS): ICD-10-CM

## 2025-06-02 PROCEDURE — 99213 OFFICE O/P EST LOW 20 MIN: CPT | Performed by: OTOLARYNGOLOGY

## 2025-06-02 PROCEDURE — 92557 COMPREHENSIVE HEARING TEST: CPT

## 2025-06-02 PROCEDURE — 92567 TYMPANOMETRY: CPT

## 2025-06-02 NOTE — PROGRESS NOTES
[facility]  DIVISION OF OTOLARYNGOLOGY- HEAD & NECK SURGERY  Follow up      Patient Name: SALLY Mai  Medical Record Number:  [id number]  Primary Care Physician:  Janna Meek MD  Date of Consultation: 6/2/2025    Chief Complaint: Tinnitus        Interval History  Patient is following up for her ears.  She has been having ringing in the right ear.  She actually has a complicated history.  I saw her in August 2023 and she had asymmetric hearing loss.  An MRI showed a vestibular schwannoma.  She actually not having surgery for this and schwannoma actually not being on the facial nerve.  I did not remove it.  She did get stereotactic radiation.  She did have a bit of weakness of the facial nerve after surgery, but has recovered.  She is overall doing well.  She just complains of tinnitus.  She does have hearing aids            REVIEW OF SYSTEMS  As above    PHYSICAL EXAM  GENERAL: No Acute Distress, Alert and Oriented, no Hoarseness, strong voice  EYES: EOMI, Anti-icteric  HENT:   Head: Normocephalic and atraumatic.   Face:  Symmetric, facial nerve intact, no sinus tenderness  Right Ear: Normal external ear, normal external auditory canal, intact tympanic membrane with normal mobility and aerated middle ear  Left Ear: Normal external ear, normal external auditory canal, intact tympanic membrane with normal mobility and aerated middle ear  Mouth/Oral Cavity:  normal lips, Uvula is midline, no mucosal lesions, no trismus  Oropharynx/Larynx:  normal oropharynx,   Nose:Normal external nasal appearance.    NECK: Normal range of motion, no thyromegaly, trachea is midline, no lymphadenopathy, no neck masses, no crepitus    She had an audiogram today that showed moderate to moderate severe sensorineural hearing loss on the right with normal sloping to mild sensorineural hearing loss on the left.      ASSESSMENT/PLAN  1. Sensorineural hearing loss (SNHL) of both ears  Her hearing test was pretty much as expected.

## 2025-06-02 NOTE — PROGRESS NOTES
SALLY Mai   1970, 54 y.o. female   [id number]       Referring Provider: Mehdi Alfred MD  Referral Type: In an order in Epic    Reason for Visit: Evaluation of suspected change in hearing, tinnitus, or balance.    ADULT AUDIOLOGIC EVALUATION      SALLY Mai is a 54 y.o. female seen today, 6/2/2025 , for a recheck audiologic evaluation.  Patient was seen by Mehdi Alfred MD following today's evaluation.    AUDIOLOGIC AND OTHER PERTINENT MEDICAL HISTORY:      SALLY Mai reports a constant tinnitus in the right ear. She stated it was occurring before surgery and sounded like a bell. She noted that now, the tinnitus is like a constant signal and it is very bothersome. She stated this tinnitus has been going on since before she got the hearing aids. She denied any major changes in her hearing since her last test on 1/13/2025.    Case history and results from previous audiogram on 1/13/2025  SALLY Mai reports an increased difficultly in hearing, especially in noise. She is unsure if her left ear has changed since her last test on 11/27/2023. She reported an improvement in the dizziness and tinnitus since surgery. She denied any recent tinnitus or dizzy spells. She stated she will obtain a repeat MRI in February to monitor the tumor.     Testing revealed a Moderate sloping to Moderately-Severe Sensorineural hearing loss in the right ear and a Normal sloping to Mild Sensorineural hearing loss in the left ear.      Chart Review  Patient obtained an MRI on 8/30/2023 per recommendation of Dr. Alfred which revealed a right-sided acoustic neuroma. She saw Dr. Douglas at  on 9/18/23 and decided to proceed with resection of the neuroma. Patient underwent surgery with Dr. Douglas on 11/14/2023. An audiogram post-surgery at  on 11/27/23 revealed a moderate sloping to severe sensorineural hearing loss in the right ear with fair word recognition (72%). Patient noted that during surgery, the surgeons decided

## 2025-06-03 NOTE — PATIENT INSTRUCTIONS
help improve blood flow to the ear.    Ways to manage/cope with tinnitus  Some tinnitus may last a long time. To manage your tinnitus, try to:  Avoid noises that you think caused your tinnitus. If you can't avoid loud noises, wear earplugs or earmuffs.  Ignore the sound by paying attention to other things.  Keeping your brain busy with other tasks or background noise can help your brain not focus on the tinnitus.  Try to not give the tinnitus an emotional reaction.  Do your best to ignore the sound and not let it bother you. Relax using biofeedback, meditation, or yoga. Feeling stressed and being tired can make tinnitus worse.  Play music or white noise to help you sleep.  Background noise may cover up the noise that you hear in your ears.  You can buy a tabletop machine or a device that sits under your pillow to play soothing sounds, like ocean waves.  Smart phones have free apps, such as Whist, Relax Melodies, ReSound Relief, and White Noise Lite.  These apps have different types of sounds/noise, some of which you can blend together to find sounds that are most soothing to you.  Hearing aid technology, especially when there is some hearing loss, may help reduce tinnitus symptoms by giving your brain better access to the sounds it is missing.  There are some hearing aids with built-in noise generator programs, which may help when amplification alone is not enough.        Additional resources may be found through the [employer] at www.guerda.org    When should you call for help?  Call 911 anytime you think you may need emergency care. For example, call if:    You have symptoms of a stroke. These may include:  Sudden numbness, tingling, weakness, or loss of movement in your face, arm, or leg, especially on only one side of your body.  Sudden vision changes.  Sudden trouble speaking.  Sudden confusion or trouble understanding simple statements.  Sudden problems with walking or balance.  A sudden,

## 2025-06-04 ENCOUNTER — PROCEDURE VISIT (OUTPATIENT)
Dept: AUDIOLOGY | Age: 55
End: 2025-06-04

## 2025-06-04 DIAGNOSIS — H90.3 SENSORINEURAL HEARING LOSS (SNHL) OF BOTH EARS: Primary | ICD-10-CM

## 2025-06-04 NOTE — PROGRESS NOTES
SALLY Adelaide Mai  1970.54 y.o. female   [id number]    HEARING AID CHECK      RIGHT EAR: /MODEL: Phonak Audeo I90-R  SN: [id number]  EARMOLD/TUBING/WIRE/DOME: 0M, Small Vented    LEFT EAR: /MODEL: Phonak Audeo I90-R  SN: [id number]  EARMOLD/TUBING/WIRE/DOME: 0M, Large Open    ACCESSORIES: N/A  HAF: 3/6/25  WARRANTY: 3/26/2028  TRIAL PERIOD ENDS:4/6/25  L&D ELIGIBLE: Yes     BUTTONS: ENABLED  PROGRAMS: DISABLED  PHONE CONNECTIVITY: Connected to Phone and Sarabjit      D Adelaide Mai was seen today, 6/4/2025, for a hearing aid check appointment.    PROCEDURES:     Patient noted no improvement in her tinnitus since she got the hearing aids. It was decided that a tinnitus program would be activated in the hearing aids.     Connected devices to fitting software. Tinnitus program activated in automatic programming per patient request. A pleasant/neutral sound was selected and volume was adjusted to where the patient could still hear her tinnitus. Patient was instructed on the science behind TRT and the benefit of still hearing the tinnitus. A copy of her automatic programming without the tinnitus masker was created in case she wants to turn it off.     Patient encouraged to continue using environmental sound enrichment when she does not have the hearing aids on.     PATIENT EDUCATION:     - Verbally and visually reviewed importance of consistent use and care and maintenance of devices.      Information was verbally shared with patient during appointment.        RECOMMENDATIONS:     Continue consistent hearing aid use  Return for a hearing aid check as needed  Retest hearing as medically indicated and/or sooner if a change in hearing is noted.  Contact audiologist with questions/concerns as needed      **No charge for today's appointment; patient utilized 2 out of 3 follow-up visits.       Laura Bernard  Audiologist

## 2025-06-14 DIAGNOSIS — R06.02 SHORTNESS OF BREATH: ICD-10-CM

## 2025-06-16 RX ORDER — ALBUTEROL SULFATE 90 UG/1
2 INHALANT RESPIRATORY (INHALATION) EVERY 6 HOURS PRN
Qty: 8.5 G | Refills: 1 | Status: SHIPPED | OUTPATIENT
Start: 2025-06-16

## 2025-06-16 NOTE — TELEPHONE ENCOUNTER
Medication:   Requested Prescriptions     Pending Prescriptions Disp Refills    albuterol sulfate HFA (PROVENTIL;VENTOLIN;PROAIR) 108 (90 Base) MCG/ACT inhaler [Pharmacy Med Name: ALBUTEROL HFA INH (200 PUFFS) 8.5GM] 8.5 g      Sig: INHALE 2 PUFFS INTO THE LUNGS EVERY 6 HOURS AS NEEDED FOR SHORTNESS OF BREATH     Last Filled:  5.21.25    Last appt: 5/21/2025   Next appt: Visit date not found    Last OARRS:       8/14/2017     3:31 PM   RX Monitoring   Attestation The Prescription Monitoring Report was requested today but not available.

## 2025-07-30 ENCOUNTER — HOSPITAL ENCOUNTER (OUTPATIENT)
Dept: WOMENS IMAGING | Age: 55
Discharge: HOME | End: 2025-07-30
Payer: COMMERCIAL

## 2025-07-30 VITALS — BODY MASS INDEX: 35.87 KG/M2 | WEIGHT: 190 LBS | HEIGHT: 61 IN

## 2025-07-30 DIAGNOSIS — Z12.31 ENCOUNTER FOR SCREENING MAMMOGRAM FOR BREAST CANCER: ICD-10-CM

## 2025-07-30 PROCEDURE — 77063 BREAST TOMOSYNTHESIS BI: CPT

## 2025-09-05 ENCOUNTER — PROCEDURE VISIT (OUTPATIENT)
Dept: AUDIOLOGY | Age: 55
End: 2025-09-05

## 2025-09-05 DIAGNOSIS — H90.3 SENSORINEURAL HEARING LOSS (SNHL) OF BOTH EARS: Primary | ICD-10-CM
